# Patient Record
Sex: FEMALE | Race: WHITE | NOT HISPANIC OR LATINO | Employment: UNEMPLOYED | URBAN - METROPOLITAN AREA
[De-identification: names, ages, dates, MRNs, and addresses within clinical notes are randomized per-mention and may not be internally consistent; named-entity substitution may affect disease eponyms.]

---

## 2017-02-23 ENCOUNTER — ALLSCRIPTS OFFICE VISIT (OUTPATIENT)
Dept: OTHER | Facility: OTHER | Age: 22
End: 2017-02-23

## 2017-02-23 LAB
BILIRUB UR QL STRIP: NEGATIVE
CLARITY UR: NORMAL
COLOR UR: YELLOW
GLUCOSE (HISTORICAL): NEGATIVE
HCG, QUALITATIVE (HISTORICAL): NEGATIVE
HGB UR QL STRIP.AUTO: NEGATIVE
KETONES UR STRIP-MCNC: NEGATIVE MG/DL
LEUKOCYTE ESTERASE UR QL STRIP: NORMAL
NITRITE UR QL STRIP: NEGATIVE
PH UR STRIP.AUTO: 6.5 [PH]
PROT UR STRIP-MCNC: NEGATIVE MG/DL
SP GR UR STRIP.AUTO: 1
UROBILINOGEN UR QL STRIP.AUTO: 0.2

## 2017-03-09 ENCOUNTER — ALLSCRIPTS OFFICE VISIT (OUTPATIENT)
Dept: OTHER | Facility: OTHER | Age: 22
End: 2017-03-09

## 2017-03-09 LAB — HCG, QUALITATIVE (HISTORICAL): NEGATIVE

## 2017-03-21 ENCOUNTER — ALLSCRIPTS OFFICE VISIT (OUTPATIENT)
Dept: OTHER | Facility: OTHER | Age: 22
End: 2017-03-21

## 2017-07-27 ENCOUNTER — ALLSCRIPTS OFFICE VISIT (OUTPATIENT)
Dept: OTHER | Facility: OTHER | Age: 22
End: 2017-07-27

## 2017-07-27 LAB — HCG, QUALITATIVE (HISTORICAL): NEGATIVE

## 2017-10-05 ENCOUNTER — ALLSCRIPTS OFFICE VISIT (OUTPATIENT)
Dept: OTHER | Facility: OTHER | Age: 22
End: 2017-10-05
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
## 2018-01-13 VITALS
RESPIRATION RATE: 18 BRPM | TEMPERATURE: 97.4 F | HEIGHT: 62 IN | DIASTOLIC BLOOD PRESSURE: 82 MMHG | BODY MASS INDEX: 34.78 KG/M2 | SYSTOLIC BLOOD PRESSURE: 110 MMHG | WEIGHT: 189 LBS | HEART RATE: 80 BPM

## 2018-01-13 VITALS
WEIGHT: 189 LBS | SYSTOLIC BLOOD PRESSURE: 112 MMHG | RESPIRATION RATE: 16 BRPM | HEIGHT: 62 IN | BODY MASS INDEX: 34.78 KG/M2 | HEART RATE: 82 BPM | DIASTOLIC BLOOD PRESSURE: 70 MMHG | TEMPERATURE: 99.5 F

## 2018-01-15 VITALS
TEMPERATURE: 98.9 F | SYSTOLIC BLOOD PRESSURE: 114 MMHG | HEIGHT: 62 IN | DIASTOLIC BLOOD PRESSURE: 72 MMHG | WEIGHT: 189.13 LBS | HEART RATE: 74 BPM | RESPIRATION RATE: 16 BRPM | BODY MASS INDEX: 34.8 KG/M2

## 2018-01-16 NOTE — PROGRESS NOTES
Assessment    1  Encounter for preventive health examination (V70 0) (Z00 00)   2  Depression (311) (F32 9)    Plan  Unlinked    · FLUoxetine HCl - 20 MG Oral Capsule    Discussion/Summary  health maintenance visit Currently, she eats an adequate diet and has an inadequate exercise regimen  the risks and benefits of cervical cancer screening were discussed cervical cancer screening is needed every three years next cervical cancer screening is due 2019 Colorectal cancer screening: colorectal cancer screening is not indicated  Osteoporosis screening: bone mineral density testing is not indicated  The immunizations are up to date  Advice and education were given regarding nutrition, aerobic exercise, reproductive health and contraception  Patient discussion: discussed with the patient  1  Health maintenance  -annual physical, doing well  -discussed healthy eating and increasing activity level  -UTD on vaccines except for Flu shot, unavailable in the office today, may return in 1 week to receive flu shot  -follow up in 1 year for annual physical or sooner if needed    2  Depression  -PHQ-9 10, moderate depression  -continue Fluoxetine 40mg daily  -follow up in 6 months to re-assess  The patient was counseled regarding instructions for management, risk factor reductions, patient and family education, impressions, risks and benefits of treatment options, importance of compliance with treatment  Possible side effects of new medications were reviewed with the patient/guardian today  The treatment plan was reviewed with the patient/guardian  The patient/guardian understands and agrees with the treatment plan     Self Referrals: No      Chief Complaint  annual physical      History of Present Illness  , Adult Female: The patient is being seen for a health maintenance evaluation  The last health maintenance visit was 1 year(s) ago  General Health: The patient's health since the last visit is described as good  She has regular dental visits  The patient brushes 2 time(s) a day and reports her last dental visit was October, 2017  She denies vision problems  She denies hearing loss  Immunizations status: up to date  Lifestyle:  She does not have a healthy diet  She has weight concerns  She does not exercise regularly  She does not use tobacco  She denies alcohol use  She denies drug use  Reproductive health: the patient is premenopausal   she reports abnormal menses  (Haven't had period since Depo  )   she uses contraception  For contraception, she uses depo-medroxyprogesterone  she is sexually active  She is monogamous with a male partner  she denies prior pregnancies G 0  Screening: cancer screening reviewed and current  Cervical cancer screening includes a pap smear performed last year  Breast cancer screening includes no previous mammogram  She hasn't been previously screened for colorectal cancer  HPI: Patient is a pleasant 22 yo F presenting for annual physical      1  HM: previously on Nexplanon, now on Depo for contraception  Works at [employer]  Patient has mild cognitive delay and has a  who helps her with transportation and finances  She is otherwise independent with ADLs  2  Depression: Fluoxetine 40mg daily, notes improved symptom control since re-starting it last year  Denies SI/HI  Review of Systems    Constitutional: no fever, not feeling poorly, no chills and not feeling tired  Cardiovascular: no chest pain and no palpitations  Respiratory: no shortness of breath, no cough, no wheezing and no shortness of breath during exertion  Gastrointestinal: no abdominal pain, no nausea, no constipation and no diarrhea  Musculoskeletal: no arthralgias and no myalgias  Integumentary: no rashes and no skin lesions  Neurological: no headache, no numbness, no fainting and no difficulty walking     Psychiatric: sleep disturbances and depression, but as noted in HPI and not suicidal  Endocrine: no muscle weakness  no feelings of weakness     ROS reviewed  Active Problems    1  Acne (706 1) (L70 9)   2  Acute UTI (urinary tract infection) (599 0) (N39 0)   3  Altered level of consciousness (780 09) (R40 4)   4  Birth control counseling (V25 09) (Z30 09)   5  Chlamydia trachomatis infection of genitourinary site (099 55) (A56 2)   6  Depot contraception (V25 49) (Z30 40)   7  Depression (311) (F32 9)   8  Dysuria (788 1) (R30 0)   9  Dysuria (788 1) (R30 0)   10  Encounter for completion of form with patient (V68 89) (Z02 89)   11  Encounter for preconception consultation (V26 49) (Z31 69)   12  Fetal alcohol syndrome (760 71) (Q86 0)   13  GERD (gastroesophageal reflux disease) (530 81) (K21 9)   14  History of vitamin D deficiency (V12 1) (Z86 39)   15  Hypoglycemia (251 2) (E16 2)   16  Insertion of Nexplanon (V25 5) (Z30 017)   17  Intellectual disability (319) (F79)   18  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)   19  Moderate major depression (296 22) (F32 1)   20  Need for HPV vaccination (V04 89) (Z23)   21  Need for immunization against influenza (V04 81) (Z23)   22  Nexplanon removal (V25 43) (Z30 46)   23  Obesity (278 00) (E66 9)   24  Obesity (278 00) (E66 9)   25  Papanicolaou smear for cervical cancer screening (V76 2) (Z12 4)   26  Screen for STD (sexually transmitted disease) (V74 5) (Z11 3)   27  Vaginal candidiasis (112 1) (B37 3)    Past Medical History    · Altered level of consciousness (780 09) (R40 4)   · History of Intellectual disability (319) (F79)   · History of Need for HPV vaccination (V04 89) (Z23)   · History of Need for HPV vaccination (V04 89) (Z23)    Family History  Family History    · Adopted (V68 89) (Z02 82)    Social History    · Depot contraception (V25 49) (Z30 40)   · Never a smoker   · No alcohol use   · No drug use    Current Meds   1  FLUoxetine HCl - 20 MG Oral Capsule; Therapy: 29CIL8362 to Recorded   2   FLUoxetine HCl - 40 MG Oral Capsule; take 1 capsule daily; Therapy: 83XTP3560 to (Last Rx:07Jun2017)  Requested for: 07Jun2017 Ordered    Allergies    1  No Known Drug Allergies    2  No Known Environmental Allergies    Vitals   Recorded: 21WPW2275 04:11PM   Temperature 99 1 F, Tympanic   Heart Rate 88   Respiration 14   Systolic 840, LUE, Sitting   Diastolic 72, LUE, Sitting   BP CUFF SIZE Large   Height 5 ft 2 in   Weight 182 lb 8 oz   BMI Calculated 33 38   BSA Calculated 1 84   Pain Scale 0     Physical Exam    Constitutional   General appearance: No acute distress, well appearing and well nourished  Head and Face   Head and face: Normal     Eyes   Conjunctiva and lids: No swelling, erythema or discharge  Ears, Nose, Mouth, and Throat   External inspection of ears and nose: Normal     Otoscopic examination: Tympanic membranes translucent with normal light reflex  Canals patent without erythema  Oropharynx: Normal with no erythema, edema, exudate or lesions  Pulmonary   Respiratory effort: No increased work of breathing or signs of respiratory distress  Auscultation of lungs: Clear to auscultation  Cardiovascular   Auscultation of heart: Normal rate and rhythm, normal S1 and S2, no murmurs  Abdomen   Abdomen: Non-tender, no masses  Lymphatic   Palpation of lymph nodes in neck: No lymphadenopathy  Musculoskeletal   Gait and station: Normal     Skin   Skin and subcutaneous tissue: Normal without rashes or lesions  Psychiatric   Judgment and insight: Normal     Orientation to person, place, and time: Normal     Recent and remote memory: Intact  Mood and affect: Normal        Results/Data  PHQ-9 Adult Depression Screening 87NFN9597 04:36PM Verdene Tyler     Test Name Result Flag Reference   PHQ-9 Adult Depression Score 10     Over the last two weeks, how often have you been bothered by any of the following problems?   Little interest or pleasure in doing things: Several days - 1  Feeling down, depressed, or hopeless: Several days - 1  Trouble falling or staying asleep, or sleeping too much: Nearly every day - 3  Feeling tired or having little energy: More than half the days - 2  Poor appetite or over eating: Several days - 1  Feeling bad about yourself - or that you are a failure or have let yourself or your family down: More than half the days - 2  Trouble concentrating on things, such as reading the newspaper or watching television: Not at all - 0  Moving or speaking so slowly that other people could have noticed  Or the opposite -  being so fidgety or restless that you have been moving around a lot more than usual: Not at all - 0  Thoughts that you would be better off dead, or of hurting yourself in some way: Not at all - 0   PHQ-9 Adult Depression Screening Negative     PHQ-9 Difficulty Level Somewhat difficult     PHQ-9 Severity Moderate Depression         Attending Note  Attending Note: Attending Note: I discussed the case with the Resident and reviewed the Resident's note, I supervised the Resident and I agree with the Resident management plan as it was presented to me  Level of Participation: I was present in clinic, but did not examine the patient  I agree with the Resident's note        Future Appointments    Date/Time Provider Specialty Site   10/17/2017 11:30 AM Nurse [place] Schedule  [facility] [address] [facility]     Signatures   Electronically signed by : Mikie Sadler DO; Oct  5 2017  4:47PM EST                       (Author)    Electronically signed by : Alejo Angelucci, M D ; Oct  6 2017  1:28PM EST                       (Author)

## 2018-01-22 VITALS
DIASTOLIC BLOOD PRESSURE: 72 MMHG | RESPIRATION RATE: 14 BRPM | SYSTOLIC BLOOD PRESSURE: 114 MMHG | BODY MASS INDEX: 33.58 KG/M2 | TEMPERATURE: 99.1 F | HEART RATE: 88 BPM | HEIGHT: 62 IN | WEIGHT: 182.5 LBS

## 2018-02-22 ENCOUNTER — OFFICE VISIT (OUTPATIENT)
Dept: FAMILY MEDICINE CLINIC | Facility: CLINIC | Age: 23
End: 2018-02-22
Payer: COMMERCIAL

## 2018-02-22 VITALS
BODY MASS INDEX: 34.52 KG/M2 | WEIGHT: 202.2 LBS | TEMPERATURE: 99 F | HEIGHT: 64 IN | RESPIRATION RATE: 18 BRPM | SYSTOLIC BLOOD PRESSURE: 104 MMHG | DIASTOLIC BLOOD PRESSURE: 70 MMHG | HEART RATE: 82 BPM

## 2018-02-22 DIAGNOSIS — F33.9 MAJOR DEPRESSION, RECURRENT, CHRONIC (HCC): Primary | ICD-10-CM

## 2018-02-22 DIAGNOSIS — F70 MILD INTELLECTUAL DISABILITY: ICD-10-CM

## 2018-02-22 PROCEDURE — 99213 OFFICE O/P EST LOW 20 MIN: CPT | Performed by: FAMILY MEDICINE

## 2018-02-22 RX ORDER — FLUOXETINE HYDROCHLORIDE 40 MG/1
1 CAPSULE ORAL DAILY
COMMUNITY
Start: 2016-10-24 | End: 2018-02-22 | Stop reason: SDUPTHER

## 2018-02-22 RX ORDER — FLUOXETINE HYDROCHLORIDE 60 MG/1
60 TABLET, FILM COATED ORAL; ORAL DAILY
Qty: 30 TABLET | Refills: 11 | Status: SHIPPED | OUTPATIENT
Start: 2018-02-22 | End: 2018-05-08 | Stop reason: SDUPTHER

## 2018-02-22 NOTE — PROGRESS NOTES
Barbie Lee 1995 female MRN: [id number]    Follow-up Visit      SUBJECTIVE  CC: Medication Refill    HPI:  Barbie Lee is a 23 y o  female with mild IQ disability who presented for a follow-up of Depression  She has had intermittent depression over the past 1 year, without hallucination or thoughts/attempts of self-harm or harming others  She lives with friends, has a  (not present), and has a monogamous heterosexual relationship, without condom usage (not interested), on birth control  Feels safe at home and in her relationship  Review of Systems   Constitutional: Positive for fatigue  Negative for fever  Respiratory: Negative for cough, chest tightness and shortness of breath  Cardiovascular: Negative for chest pain, palpitations and leg swelling  Genitourinary: Negative for genital sores and pelvic pain  Neurological: Negative for dizziness, light-headedness and headaches  Psychiatric/Behavioral: Positive for decreased concentration and sleep disturbance  Negative for behavioral problems, confusion, hallucinations, self-injury and suicidal ideas  The patient is not nervous/anxious  Historical Information   The patient history was reviewed as follows:  Past Medical History:   Diagnosis Date    Altered level of consciousness     Intellectual disability      History reviewed  No pertinent surgical history  Family History   Problem Relation Age of Onset    Adopted: Yes      Social History   History   Alcohol Use No     History   Drug Use No     History   Smoking Status    Never Smoker   Smokeless Tobacco    Not on file       Medications:   Meds/Allergies   Current Outpatient Prescriptions   Medication Sig Dispense Refill    FLUoxetine (PROzac) 60 MG TABS Take 1 tablet (60 mg total) by mouth daily 30 tablet 11     No current facility-administered medications for this visit          No Known Allergies    OBJECTIVE  Vitals:   Vitals:    02/22/18 1632   BP: 104/70   Pulse: 82   Resp: 18   Temp: 99 °F (37 2 °C)   Weight: 91 7 kg (202 lb 3 2 oz)   Height: 5' 4 1" (1 628 m)       Invasive Devices          No matching active lines, drains, or airways          Physical Exam   Constitutional: She is oriented to person, place, and time  She appears well-developed and well-nourished  No distress  Obese   Eyes: Conjunctivae are normal    Neck: Neck supple  Cardiovascular: Normal rate, regular rhythm and normal heart sounds  No murmur heard  Pulmonary/Chest: Effort normal and breath sounds normal  No respiratory distress  She has no wheezes  She has no rales  Abdominal: Soft  She exhibits no distension  There is no tenderness  There is no rebound and no guarding  Musculoskeletal: She exhibits no edema  Neurological: She is alert and oriented to person, place, and time  She exhibits normal muscle tone  Coordination normal    Skin: She is not diaphoretic  Lab:  I have personally reviewed all prior pertinent results  No visits with results within 6 Month(s) from this visit  Latest known visit with results is:   [facility] Office Visit on 07/27/2017   Component Date Value Ref Range Status    HCG, Qualitative 07/27/2017 Negative   Final    Comment:   Performed at: In Office  ,     ]      Assessment/Plan      Major depression, recurrent, chronic (HCC)  - PHQ-9= 16  - TSH 2 7 in 2015  - F/u daily vitamin D supplementation to decrease risk of SAD  - Increasing from 40mg, to FLUoxetine (PROzac) 60 MG TABS; Take 1 tablet (60 mg total) by mouth daily    Mild intellectual disability   - F/u with SW to review care with her      - Previous recent STD testing negative  Body mass index is 34 6 kg/m²   -> F/u counseling on diet, weight loss, aerobic and weight-bearing exercise  PMH, stable, chronic  -> Continuing all home medications or alternatives as reviewed and reconciled      Disposition: Anticipate improvement with increased SSRI dosage  PCP: JAIRO  Follow-up recommendations: Reassess in 4 weeks  No future appointments        _____________________________________________________________________   The attending physician, Dr Alysa Bonds, agreed with the plan      Monserrat Hartman DO, PGY-3  [facility]   2/22/2018

## 2018-02-22 NOTE — PROGRESS NOTES
Jesica Cooper 1995 female MRN: [id number]    Family Medicine Follow-up Visit    ASSESSMENT/PLAN  Problem List Items Addressed This Visit     None              Future Appointments  Date Time Provider Stephani Hernandez   2/22/2018 3:40 PM DO REHAN Borjas [facility]          SUBJECTIVE  CC: No chief complaint on file  HPI:  Jesica Cooper is a 23 y o  female who presents for follow up, med refills  ***    Review of Systems    Historical Information   The patient history was reviewed as follows:    Past Medical History:   Diagnosis Date    Altered level of consciousness     Intellectual disability      No past surgical history on file  Family History   Problem Relation Age of Onset    Adopted: Yes      Social History   History   Alcohol Use No     History   Drug Use No     History   Smoking Status    Never Smoker   Smokeless Tobacco    Not on file       Medications:   No current outpatient prescriptions on file  Allergies not on file    OBJECTIVE    Vitals: There were no vitals filed for this visit          Physical Exam           as    Peggy Jolley DO, PGY-3  [facility]   2/22/2018

## 2018-05-07 NOTE — PROGRESS NOTES
Wagner Clark 1995 female MRN: [id number]    Family Medicine Follow-up Visit      SUBJECTIVE    CC: Follow-up and Depression    HPI:  Wagner Clark is a 23 y o  female who presented for a follow-up of Depression   This is a chronic problem  The problem has been unchanged  Pertinent negatives include no abdominal pain, anorexia, arthralgias, chest pain, chills, congestion, coughing, fatigue, fever, headaches, myalgias, nausea, rash, sore throat, visual change, vomiting or weakness  The symptoms are aggravated by stress  Treatments tried: ssri  The treatment provided moderate relief  Has not tried therapy before  Willing to try   in office states patient was not always compliant with taking pills but now she has been  Stressed importance of compliance  Patient also here for birth control - she has not had DepoProvera since October (overdue)  Has been sexually active without condoms  Will obtain UPreg and Re-start Depo  PHQ-9 Depression Screening    PHQ-9:    Frequency of the following problems over the past two weeks:       Little interest or pleasure in doing things:  1 - several days  Feeling down, depressed, or hopeless:  1 - several days  Trouble falling or staying asleep, or sleeping too much:  3 - nearly every day  Feeling tired or having little energy:  3 - nearly every day  Poor appetite or overeating:  3 - nearly every day  Feeling bad about yourself - or that you are a failure or have let yourself or your family down:  1 - several days  Trouble concentrating on things, such as reading the newspaper or watching television:  1 - several days  Moving or speaking so slowly that other people could have noticed   Or the opposite - being so fidgety or restless that you have been moving around a lot more than usual:  1 - several days  Thoughts that you would be better off dead, or of hurting yourself in some way:  0 - not at all  PHQ-2 Score:  2  PHQ-9 Score:  14         Review of Systems   Constitutional: Negative for activity change, chills, fatigue and fever  HENT: Negative for congestion, rhinorrhea and sore throat  Eyes: Negative for visual disturbance  Respiratory: Negative for cough, shortness of breath and wheezing  Cardiovascular: Negative for chest pain and palpitations  Gastrointestinal: Negative for abdominal pain, anorexia, blood in stool, constipation, diarrhea, nausea and vomiting  Genitourinary: Negative for dysuria  Musculoskeletal: Negative for arthralgias and myalgias  Skin: Negative for rash  Neurological: Negative for dizziness, weakness and headaches  Psychiatric/Behavioral: Positive for depression  All other systems reviewed and are negative  Historical Information     The patient history was reviewed as follows:    Past Medical History:   Diagnosis Date    Altered level of consciousness     Intellectual disability      History reviewed  No pertinent surgical history  Family History   Problem Relation Age of Onset    Adopted:  Yes    No Known Problems Mother     No Known Problems Father       Social History   History   Alcohol Use No     History   Drug Use No     History   Smoking Status    Never Smoker   Smokeless Tobacco    Never Used       Medications:   Meds/Allergies     Current Outpatient Prescriptions:     FLUoxetine (PROzac) 60 MG TABS, Take 1 tablet (60 mg total) by mouth daily, Disp: 90 tablet, Rfl: 2    Current Facility-Administered Medications:     MedroxyPROGESTERone Acetate ELIS 150 mg, 150 mg, Intramuscular, Q3 Months, Zita Douglas MD  No Known Allergies    OBJECTIVE    Vitals:   Vitals:    05/08/18 1317   BP: 128/80   BP Location: Right arm   Patient Position: Sitting   Cuff Size: Large   Pulse: 82   Resp: 16   Temp: 98 5 °F (36 9 °C)   TempSrc: Tympanic   Weight: 89 8 kg (198 lb)   Height: 5' 3 9" (1 623 m)     Wt Readings from Last 3 Encounters:   05/08/18 89 8 kg (198 lb)   02/22/18 91 7 kg (202 lb 3 2 oz) 10/05/17 82 8 kg (182 lb 8 oz)     Body mass index is 34 09 kg/m²  Temp Readings from Last 3 Encounters:   05/08/18 98 5 °F (36 9 °C) (Tympanic)   02/22/18 99 °F (37 2 °C)   10/05/17 99 1 °F (37 3 °C) (Tympanic)     BP Readings from Last 3 Encounters:   05/08/18 128/80   02/22/18 104/70   10/05/17 114/72     Pulse Readings from Last 3 Encounters:   05/08/18 82   02/22/18 82   10/05/17 88     No LMP recorded  Physical Exam:    Physical Exam   Constitutional: Vital signs are normal  She appears well-developed and well-nourished  She does not appear ill  No distress  HENT:   Head: Normocephalic and atraumatic  Right Ear: External ear normal    Left Ear: External ear normal    Nose: Nose normal    Eyes: Conjunctivae, EOM and lids are normal    Neck: No JVD present  No tracheal deviation present  No thyromegaly present  Cardiovascular: Intact distal pulses  Pulmonary/Chest: No accessory muscle usage  No respiratory distress  Abdominal: Normal appearance  Neurological: She is alert  Skin: No rash noted  She is not diaphoretic  Psychiatric: She has a normal mood and affect  Her speech is normal and behavior is normal  She expresses no suicidal ideation  Nursing note and vitals reviewed  Labs: I have personally reviewed all pertinent results  Imaging:  I have personally reviewed all pertinent results  Assessment/Plan   Encounter for counseling regarding contraception  Patient desires to continue Depo-Provera  Urine pregnancy test in office negative  Administered Dep-Provera injection in office today    Depression  Continue Prozac 60mg daily   Ensure better compliance  Start therapy as adjunct  Discussed behavioral interventions (exercise, diet, regular sleep-wake cycle)    Bere Lopez was seen today for follow-up and depression      Diagnoses and all orders for this visit:    Single current episode of major depressive disorder, unspecified depression episode severity  -     Ambulatory referral to behavioral health therapists; Future  -     TSH, 3rd generation with T4 reflex; Future    Class 1 obesity due to excess calories without serious comorbidity in adult, unspecified BMI  -     Lipid Panel with Direct LDL reflex; Future  -     Basic metabolic panel; Future  -     HEMOGLOBIN A1C W/ EAG ESTIMATION; Future    Major depression, recurrent, chronic (HCC)  -     FLUoxetine (PROzac) 60 MG TABS; Take 1 tablet (60 mg total) by mouth daily    Encounter for counseling regarding contraception  -     POCT urine HCG    Depot contraception  -     MedroxyPROGESTERone Acetate ELIS 150 mg; Inject 1 mL (150 mg total) into the shoulder, thigh, or buttocks every 3 (three) months     Other orders  -     Cancel: Ambulatory referral to Weight Management; Future      - PCP: Edil Loza MD  - Follow-up appointments:     No future appointments      _____________________________________________________________________   The attending physician, Dr Nadege Philippe, agreed with the plan      Cherie Lieberman MD, PGY-2  [facility]   5/8/2018

## 2018-05-08 ENCOUNTER — OFFICE VISIT (OUTPATIENT)
Dept: FAMILY MEDICINE CLINIC | Facility: CLINIC | Age: 23
End: 2018-05-08
Payer: COMMERCIAL

## 2018-05-08 VITALS
TEMPERATURE: 98.5 F | BODY MASS INDEX: 33.8 KG/M2 | HEART RATE: 82 BPM | DIASTOLIC BLOOD PRESSURE: 80 MMHG | HEIGHT: 64 IN | SYSTOLIC BLOOD PRESSURE: 128 MMHG | RESPIRATION RATE: 16 BRPM | WEIGHT: 198 LBS

## 2018-05-08 DIAGNOSIS — Z30.42 DEPOT CONTRACEPTION: ICD-10-CM

## 2018-05-08 DIAGNOSIS — F32.9 SINGLE CURRENT EPISODE OF MAJOR DEPRESSIVE DISORDER, UNSPECIFIED DEPRESSION EPISODE SEVERITY: Primary | ICD-10-CM

## 2018-05-08 DIAGNOSIS — E66.09 CLASS 1 OBESITY DUE TO EXCESS CALORIES WITHOUT SERIOUS COMORBIDITY IN ADULT, UNSPECIFIED BMI: ICD-10-CM

## 2018-05-08 DIAGNOSIS — Z30.09 ENCOUNTER FOR COUNSELING REGARDING CONTRACEPTION: ICD-10-CM

## 2018-05-08 DIAGNOSIS — F33.9 MAJOR DEPRESSION, RECURRENT, CHRONIC (HCC): ICD-10-CM

## 2018-05-08 LAB — SL AMB POCT URINE HCG: NEGATIVE

## 2018-05-08 PROCEDURE — 3008F BODY MASS INDEX DOCD: CPT | Performed by: FAMILY MEDICINE

## 2018-05-08 PROCEDURE — 81025 URINE PREGNANCY TEST: CPT | Performed by: FAMILY MEDICINE

## 2018-05-08 PROCEDURE — 99213 OFFICE O/P EST LOW 20 MIN: CPT | Performed by: FAMILY MEDICINE

## 2018-05-08 RX ORDER — FLUOXETINE HYDROCHLORIDE 60 MG/1
60 TABLET, FILM COATED ORAL; ORAL DAILY
Qty: 90 TABLET | Refills: 2 | Status: SHIPPED | OUTPATIENT
Start: 2018-05-08 | End: 2019-02-05 | Stop reason: SDUPTHER

## 2018-05-08 RX ORDER — MEDROXYPROGESTERONE ACETATE 150 MG/ML
150 INJECTION, SUSPENSION INTRAMUSCULAR
Status: DISCONTINUED | OUTPATIENT
Start: 2018-05-08 | End: 2018-05-08

## 2018-05-08 RX ORDER — MEDROXYPROGESTERONE ACETATE 150 MG/ML
150 INJECTION, SUSPENSION INTRAMUSCULAR
Status: DISCONTINUED | OUTPATIENT
Start: 2018-05-08 | End: 2019-01-29

## 2018-05-08 RX ADMIN — MEDROXYPROGESTERONE ACETATE 150 MG: 150 INJECTION, SUSPENSION INTRAMUSCULAR at 13:30

## 2018-05-08 NOTE — PATIENT INSTRUCTIONS
Depression   AMBULATORY CARE:   Depression  is a medical condition that causes feelings of sadness or hopelessness that do not go away  Depression may cause you to lose interest in things you used to enjoy  These feelings may interfere with your daily life  Common symptoms include the following:   · Appetite changes, or weight gain or loss    · Trouble going to sleep or staying asleep, or sleeping too much    · Fatigue or lack of energy    · Feeling restless, irritable, or withdrawn    · Feeling worthless, hopeless, discouraged, or guilty    · Trouble concentrating, remembering things, doing daily tasks, or making decisions    · Thoughts about hurting or killing yourself  Call 911 for any of the following:   · You think about harming yourself or someone else  Contact your healthcare provider if:   · Your symptoms do not improve  · You cannot make it to your next appointment  · You have new symptoms  · You have questions or concerns about your condition or care  Treatment for depression  may include medicine to improve or balance your mood  Therapy may also be used to treat your depression  A therapist will help you learn to cope with your thoughts and feelings  Therapy can be done alone or in a group  It may also be done with family members or a significant other  Self-care:   · Get regular physical activity  Try to exercise for 30 minutes, 3 to 5 days a week  Work with your healthcare provider to develop an exercise plan that you enjoy  Physical activity may improve your symptoms  · Get enough sleep  Create a routine to help you relax before bed  You can listen to music, read, or do yoga  Try to go to bed and wake up at the same time every day  Sleep is important for emotional health  · Eat a variety of healthy foods from all of the food groups  A healthy meal plan is low in fat, salt, and added sugar   Ask your healthcare provider for more information about a meal plan that is right for you      · Do not drink alcohol or use drugs  Alcohol and drugs can make your symptoms worse  Follow up with your healthcare provider as directed: Your healthcare provider will monitor your progress at follow-up visits  He or she will also monitor your medicine if you take antidepressants  Your healthcare provider will ask if the medicine is helping  Tell him or her about any side effects or problems you may have with your medicine  The type or amount of medicine may need to be changed  Write down your questions so you remember to ask them during your visits  © 2017 [employer]  Information is for End User's use only and may not be sold, redistributed or otherwise used for commercial purposes  All illustrations and images included in CareNotes® are the copyrighted property of [employer]  or [employer]ley  The above information is an  only  It is not intended as medical advice for individual conditions or treatments  Talk to your doctor, nurse or pharmacist before following any medical regimen to see if it is safe and effective for you  Medroxyprogesterone (By injection)   Medroxyprogesterone ([id number])  Prevents pregnancy  Also treats endometriosis and is used with other medicines to help relieve symptoms of cancer, including uterine or kidney cancer  Brand Name(s): Depo-Provera, Depo-Provera Contraceptive, Depo-SubQ Provera 104   There may be other brand names for this medicine  When This Medicine Should Not Be Used: This medicine is not right for everyone  You should not receive it if you had an allergic reaction to medroxyprogesterone or if you have a history of breast cancer or blood clots (including heart attack or stroke)  In most cases, you should not use this medicine while you are pregnant  How to Use This Medicine:   Injectable  · A nurse or other health provider will give you this medicine   This medicine is given as a shot into a muscle or just under the skin  · Your exact treatment schedule depends on the reason you are using this medicine  You doctor will explain your personal schedule  ¨ For treatment of cancer symptoms, you may start with a shot once per week  You may need fewer shots as your treatment goes forward  ¨ For birth control or endometriosis, you will need a shot every 3 months (13 weeks)  Read and follow the patient instructions that come with this medicine  Talk to your doctor or pharmacist if you have any questions  ¨ You might need to have the first shot during the first 5 days of your normal menstrual period, to make sure you are not pregnant  If you have just had a baby, you may receive a shot 5 days after birth if you are not breastfeeding or 6 weeks after birth if you are breastfeeding  · Missed dose: You must receive a shot every 3 months if you want to prevent pregnancy  Talk to your doctor or pharmacist if you do not receive your medicine on time, because you may need another form of birth control  Drugs and Foods to Avoid:   Ask your doctor or pharmacist before using any other medicine, including over-the-counter medicines, vitamins, and herbal products  · Some foods and medicines can affect how medroxyprogesterone works  Tell your doctor if you are using any of the following:  ¨ Aminoglutethimide, bosentan, carbamazepine, felbamate, griseofulvin, nefazodone, oxcarbazepine, phenobarbital, phenytoin, rifabutin, rifampin, rifapentine, Keyonna's wort, topiramate  ¨ Medicine to treat an infection (including clarithromycin, itraconazole, ketoconazole, telithromycin, voriconazole)  ¨ Medicine to treat HIV/AIDS (including atazanavir, indinavir, nelfinavir, ritonavir, saquinavir)  Warnings While Using This Medicine:   · Tell your doctor right away if you think you have become pregnant    · Tell your doctor if you are breastfeeding or if you have liver disease, kidney disease, asthma, diabetes, heart disease, seizures, migraine headaches, an eating disorder, osteoporosis, or a history of depression  Tell your doctor if you smoke  · This medicine may cause the following problems:  ¨ Blood clots, which could lead to stroke, heart attack, or other serious problems  ¨ Possible increased risk of breast cancer  ¨ Weak or thin bones, especially with long-term use  · You should not use this medicine for long-term birth control unless you cannot use any other form of birth control  · This medicine will not protect you from HIV/AIDS or other sexually transmitted diseases  · Tell any doctor or dentist who treats you that you are using this medicine  This medicine may affect certain medical test results  · Your doctor will check your progress and the effects of this medicine at regular visits  Keep all appointments  Possible Side Effects While Using This Medicine:   Call your doctor right away if you notice any of these side effects:  · Allergic reaction: Itching or hives, swelling in your face or hands, swelling or tingling in your mouth or throat, chest tightness, trouble breathing  · Chest pain, trouble breathing, or coughing up blood  · Dark urine or pale stools, nausea, vomiting, loss of appetite, stomach pain, yellow skin or eyes  · Heavy or nonstop vaginal bleeding  · Loss of vision, double vision  · Numbness or weakness on one side of your body, sudden or severe headache, problems with vision, speech, or walking  · Severe stomach pain or cramps  If you notice these less serious side effects, talk with your doctor:   · Headache  · Light or missed monthly periods, spotting between periods  · Nervousness or dizziness  · Pain, redness, burning, swelling, or a lump under your skin where the shot was given  · Weight gain  If you notice other side effects that you think are caused by this medicine, tell your doctor  Call your doctor for medical advice about side effects   You may report side effects to FDA at 1-800-FDA-1088  © 2017 [employer] [employer] Information is for End User's use only and may not be sold, redistributed or otherwise used for commercial purposes  The above information is an  only  It is not intended as medical advice for individual conditions or treatments  Talk to your doctor, nurse or pharmacist before following any medical regimen to see if it is safe and effective for you  Wellness Visit for Adults   AMBULATORY CARE:   A wellness visit  is when you see your healthcare provider to get screened for health problems  You can also get advice on how to stay healthy  Write down your questions so you remember to ask them  Ask your healthcare provider how often you should have a wellness visit  What happens at a wellness visit:  Your healthcare provider will ask about your health, and your family history of health problems  This includes high blood pressure, heart disease, and cancer  He or she will ask if you have symptoms that concern you, if you smoke, and about your mood  You may also be asked about your intake of medicines, supplements, food, and alcohol  Any of the following may be done:  · Your weight  will be checked  Your height may also be checked so your body mass index (BMI) can be calculated  Your BMI shows if you are at a healthy weight  · Your blood pressure  and heart rate will be checked  Your temperature may also be checked  · Blood and urine tests  may be done  Blood tests may be done to check your cholesterol levels  Abnormal cholesterol levels increase your risk for heart disease and stroke  You may also need a blood or urine test to check for diabetes if you are at increased risk  Urine tests may be done to look for signs of an infection or kidney disease  · A physical exam  includes checking your heartbeat and lungs with a stethoscope  Your healthcare provider may also check your skin to look for sun damage       · Screening tests  may be recommended  A screening test is done to check for diseases that may not cause symptoms  The screening tests you may need depend on your age, gender, family history, and lifestyle habits  For example, colorectal screening may be recommended if you are 48years old or older  Screening tests you need if you are a woman:   · A Pap smear  is used to screen for cervical cancer  Pap smears are usually done every 3 to 5 years depending on your age  You may need them more often if you have had abnormal Pap smear test results in the past  Ask your healthcare provider how often you should have a Pap smear  · A mammogram  is an x-ray of your breasts to screen for breast cancer  Experts recommend mammograms every 2 years starting at age 48 years  You may need a mammogram at age 52 years or younger if you have an increased risk for breast cancer  Talk to your healthcare provider about when you should start having mammograms and how often you need them  Vaccines you may need:   · Get an influenza vaccine  every year  The influenza vaccine protects you from the flu  Several types of viruses cause the flu  The viruses change over time, so new vaccines are made each year  · Get a tetanus-diphtheria (Td) booster vaccine  every 10 years  This vaccine protects you against tetanus and diphtheria  Tetanus is a severe infection that may cause painful muscle spasms and lockjaw  Diphtheria is a severe bacterial infection that causes a thick covering in the back of your mouth and throat  · Get a human papillomavirus (HPV) vaccine  if you are female and aged 23 to 32 or male 23 to 24 and never received it  This vaccine protects you from HPV infection  HPV is the most common infection spread by sexual contact  HPV may also cause vaginal, penile, and anal cancers  · Get a pneumococcal vaccine  if you are aged 72 years or older  The pneumococcal vaccine is an injection given to protect you from pneumococcal disease   Pneumococcal disease is an infection caused by pneumococcal bacteria  The infection may cause pneumonia, meningitis, or an ear infection  · Get a shingles vaccine  if you are aged 61 or older, even if you have had shingles before  The shingles vaccine is an injection to protect you from the varicella-zoster virus  This is the same virus that causes chickenpox  Shingles is a painful rash that develops in people who had chickenpox or have been exposed to the virus  How to eat healthy:  My Plate is a model for planning healthy meals  It shows the types and amounts of foods that should go on your plate  Fruits and vegetables make up about half of your plate, and grains and protein make up the other half  A serving of dairy is included on the side of your plate  The amount of calories and serving sizes you need depends on your age, gender, weight, and height  Examples of healthy foods are listed below:  · Eat a variety of vegetables  such as dark green, red, and orange vegetables  You can also include canned vegetables low in sodium (salt) and frozen vegetables without added butter or sauces  · Eat a variety of fresh fruits , canned fruit in 100% juice, frozen fruit, and dried fruit  · Include whole grains  At least half of the grains you eat should be whole grains  Examples include whole-wheat bread, wheat pasta, brown rice, and whole-grain cereals such as oatmeal     · Eat a variety of protein foods such as seafood (fish and shellfish), lean meat, and poultry without skin (turkey and chicken)  Examples of lean meats include pork leg, shoulder, or tenderloin, and beef round, sirloin, tenderloin, and extra lean ground beef  Other protein foods include eggs and egg substitutes, beans, peas, soy products, nuts, and seeds  · Choose low-fat dairy products such as skim or 1% milk or low-fat yogurt, cheese, and cottage cheese  · Limit unhealthy fats  such as butter, hard margarine, and shortening         Exercise: Exercise at least 30 minutes per day on most days of the week  Some examples of exercise include walking, biking, dancing, and swimming  You can also fit in more physical activity by taking the stairs instead of the elevator or parking farther away from stores  Include muscle strengthening activities 2 days each week  Regular exercise provides many health benefits  It helps you manage your weight, and decreases your risk for type 2 diabetes, heart disease, stroke, and high blood pressure  Exercise can also help improve your mood  Ask your healthcare provider about the best exercise plan for you  General health and safety guidelines:   · Do not smoke  Nicotine and other chemicals in cigarettes and cigars can cause lung damage  Ask your healthcare provider for information if you currently smoke and need help to quit  E-cigarettes or smokeless tobacco still contain nicotine  Talk to your healthcare provider before you use these products  · Limit alcohol  A drink of alcohol is 12 ounces of beer, 5 ounces of wine, or 1½ ounces of liquor  · Lose weight, if needed  Being overweight increases your risk of certain health conditions  These include heart disease, high blood pressure, type 2 diabetes, and certain types of cancer  · Protect your skin  Do not sunbathe or use tanning beds  Use sunscreen with a SPF 15 or higher  Apply sunscreen at least 15 minutes before you go outside  Reapply sunscreen every 2 hours  Wear protective clothing, hats, and sunglasses when you are outside  · Drive safely  Always wear your seatbelt  Make sure everyone in your car wears a seatbelt  A seatbelt can save your life if you are in an accident  Do not use your cell phone when you are driving  This could distract you and cause an accident  Pull over if you need to make a call or send a text message  · Practice safe sex  Use latex condoms if are sexually active and have more than one partner   Your healthcare provider may recommend screening tests for sexually transmitted infections (STIs)  · Wear helmets, lifejackets, and protective gear  Always wear a helmet when you ride a bike or motorcycle, go skiing, or play sports that could cause a head injury  Wear protective equipment when you play sports  Wear a lifejacket when you are on a boat or doing water sports  © 2017 [address] [employer] Information is for End User's use only and may not be sold, redistributed or otherwise used for commercial purposes  All illustrations and images included in CareNotes® are the copyrighted property of [employer]  or [employer] Marley  The above information is an  only  It is not intended as medical advice for individual conditions or treatments  Talk to your doctor, nurse or pharmacist before following any medical regimen to see if it is safe and effective for you

## 2018-05-08 NOTE — ASSESSMENT & PLAN NOTE
Patient desires to continue Depo-Provera  Urine pregnancy test in office negative  Administered Dep-Provera injection in office today

## 2018-05-08 NOTE — ASSESSMENT & PLAN NOTE
Continue Prozac 60mg daily   Ensure better compliance  Start therapy as adjunct  Discussed behavioral interventions (exercise, diet, regular sleep-wake cycle)

## 2018-09-17 ENCOUNTER — CLINICAL SUPPORT (OUTPATIENT)
Dept: FAMILY MEDICINE CLINIC | Facility: CLINIC | Age: 23
End: 2018-09-17
Payer: COMMERCIAL

## 2018-09-17 DIAGNOSIS — Z32.02 URINE PREGNANCY TEST NEGATIVE: Primary | ICD-10-CM

## 2018-09-17 LAB — SL AMB POCT URINE HCG: NEGATIVE

## 2018-09-17 PROCEDURE — 81025 URINE PREGNANCY TEST: CPT

## 2018-09-17 PROCEDURE — 96372 THER/PROPH/DIAG INJ SC/IM: CPT | Performed by: FAMILY MEDICINE

## 2018-09-17 RX ADMIN — MEDROXYPROGESTERONE ACETATE 150 MG: 150 INJECTION, SUSPENSION INTRAMUSCULAR at 14:10

## 2018-10-09 ENCOUNTER — OFFICE VISIT (OUTPATIENT)
Dept: FAMILY MEDICINE CLINIC | Facility: CLINIC | Age: 23
End: 2018-10-09
Payer: COMMERCIAL

## 2018-10-09 VITALS
BODY MASS INDEX: 33.16 KG/M2 | TEMPERATURE: 98.7 F | DIASTOLIC BLOOD PRESSURE: 80 MMHG | RESPIRATION RATE: 16 BRPM | HEART RATE: 82 BPM | SYSTOLIC BLOOD PRESSURE: 120 MMHG | WEIGHT: 194.2 LBS | HEIGHT: 64 IN

## 2018-10-09 DIAGNOSIS — Z30.09 ENCOUNTER FOR COUNSELING REGARDING CONTRACEPTION: ICD-10-CM

## 2018-10-09 DIAGNOSIS — Z11.3 ROUTINE SCREENING FOR STI (SEXUALLY TRANSMITTED INFECTION): ICD-10-CM

## 2018-10-09 DIAGNOSIS — E66.09 CLASS 1 OBESITY DUE TO EXCESS CALORIES WITHOUT SERIOUS COMORBIDITY WITH BODY MASS INDEX (BMI) OF 33.0 TO 33.9 IN ADULT: ICD-10-CM

## 2018-10-09 DIAGNOSIS — F70 MILD INTELLECTUAL DISABILITY: ICD-10-CM

## 2018-10-09 DIAGNOSIS — F33.9 MAJOR DEPRESSION, RECURRENT, CHRONIC (HCC): ICD-10-CM

## 2018-10-09 DIAGNOSIS — Z00.00 HEALTHCARE MAINTENANCE: Primary | ICD-10-CM

## 2018-10-09 DIAGNOSIS — Z23 NEED FOR INFLUENZA VACCINATION: ICD-10-CM

## 2018-10-09 DIAGNOSIS — Z13.1 SCREENING FOR DIABETES MELLITUS (DM): ICD-10-CM

## 2018-10-09 PROBLEM — G44.209 TENSION HEADACHE: Status: ACTIVE | Noted: 2018-10-09

## 2018-10-09 PROCEDURE — 87491 CHLMYD TRACH DNA AMP PROBE: CPT | Performed by: FAMILY MEDICINE

## 2018-10-09 PROCEDURE — 90471 IMMUNIZATION ADMIN: CPT | Performed by: FAMILY MEDICINE

## 2018-10-09 PROCEDURE — 99395 PREV VISIT EST AGE 18-39: CPT | Performed by: FAMILY MEDICINE

## 2018-10-09 PROCEDURE — 90686 IIV4 VACC NO PRSV 0.5 ML IM: CPT | Performed by: FAMILY MEDICINE

## 2018-10-09 PROCEDURE — 87591 N.GONORRHOEAE DNA AMP PROB: CPT | Performed by: FAMILY MEDICINE

## 2018-10-09 RX ORDER — FLUCONAZOLE 150 MG/1
TABLET ORAL
Refills: 0 | COMMUNITY
Start: 2018-10-06 | End: 2018-11-16

## 2018-10-09 NOTE — PROGRESS NOTES
Swetha Tripp 1995 female MRN: [id number]    Health Maintenance Visit    SUBJECTIVE    HPI:  Swetha Tripp is a 20 y o  female who presented for a routine health maintenance visit  She has 2 concerns:  1  Headache: bilateral squeezing headache, every other day sometimes a few minutes sometimes all day  Has been years  Nothing makes it better or worse (tried Tylenol sometimes helps)  Non-radiating  No visual disturbances, nausea, vomiting  Drinks coffee, soda, iced tea  2  Weight concern: They are concerned for her to be on Depo-Provera and weight gain  Interested in alternative  Didn't like Nexplanon  Interested in IUD  3  Interested in [address] screening and lipid screening  Walks 1-2 hours daily  Eats TID  Low vegetable content  Goes to bed at 11pm and wakes up at 7:30a  PHQ-9 Depression Screening    PHQ-9:    Frequency of the following problems over the past two weeks:       Little interest or pleasure in doing things:  0 - not at all  Feeling down, depressed, or hopeless:  1 - several days  Trouble falling or staying asleep, or sleeping too much:  1 - several days  Feeling tired or having little energy:  1 - several days  Poor appetite or overeatin - not at all  Feeling bad about yourself - or that you are a failure or have let yourself or your family down:  1 - several days  Trouble concentrating on things, such as reading the newspaper or watching television:  2 - more than half the days  Moving or speaking so slowly that other people could have noticed   Or the opposite - being so fidgety or restless that you have been moving around a lot more than usual:  0 - not at all  Thoughts that you would be better off dead, or of hurting yourself in some way:  0 - not at all  PHQ-2 Score:  1  PHQ-9 Score:  6         Health Maintenance   Topic Date Due    DTaP,Tdap,and Td Vaccines (6 - Tdap) 2004    INFLUENZA VACCINE  2018    PAP SMEAR  10/24/2019     Review of Systems Constitutional: Negative for activity change, chills and fever  HENT: Negative for congestion, rhinorrhea and sore throat  Eyes: Negative for visual disturbance  Respiratory: Negative for cough, shortness of breath and wheezing  Cardiovascular: Negative for chest pain and palpitations  Gastrointestinal: Negative for abdominal pain, blood in stool, constipation, diarrhea, nausea and vomiting  Genitourinary: Negative for dysuria  Musculoskeletal: Negative for arthralgias and myalgias  Skin: Negative for rash  Neurological: Negative for dizziness, weakness and headaches  All other systems reviewed and are negative  Historical Information   Past Medical History:   Diagnosis Date    Altered level of consciousness     Intellectual disability      No past surgical history on file  Family History   Problem Relation Age of Onset    Adopted:  Yes    No Known Problems Mother     No Known Problems Father      Social History   History   Alcohol Use    0 6 oz/week    1 Glasses of wine per week     History   Drug Use No     History   Smoking Status    Never Smoker   Smokeless Tobacco    Never Used       Medications:      Current Outpatient Prescriptions:     fluconazole (DIFLUCAN) 150 mg tablet, take 1 tablet by mouth immediately, Disp: , Rfl: 0    FLUoxetine (PROzac) 60 MG TABS, Take 1 tablet (60 mg total) by mouth daily, Disp: 90 tablet, Rfl: 2    Current Facility-Administered Medications:     medroxyPROGESTERone (DEPO-PROVERA) IM injection 150 mg, 150 mg, Intramuscular, Q3 Months, Winifred Maguire MD, 150 mg at 09/17/18 1410    No Known Allergies    OBJECTIVE  Vitals:   Vitals:    10/09/18 1053   BP: 120/80   Pulse: 82   Resp: 16   Temp: 98 7 °F (37 1 °C)   Weight: 88 1 kg (194 lb 3 2 oz)   Height: 5' 3 9" (1 623 m)     Wt Readings from Last 3 Encounters:   10/09/18 88 1 kg (194 lb 3 2 oz)   05/08/18 89 8 kg (198 lb)   02/22/18 91 7 kg (202 lb 3 2 oz)     Body mass index is 33 44 kg/m²  Temp Readings from Last 3 Encounters:   10/09/18 98 7 °F (37 1 °C)   05/08/18 98 5 °F (36 9 °C) (Tympanic)   02/22/18 99 °F (37 2 °C)     BP Readings from Last 3 Encounters:   10/09/18 120/80   05/08/18 128/80   02/22/18 104/70     Pulse Readings from Last 3 Encounters:   10/09/18 82   05/08/18 82   02/22/18 82       No LMP recorded  Physical Exam   Constitutional: She appears well-developed and well-nourished  obese   HENT:   Head: Normocephalic and atraumatic  Eyes: Conjunctivae and EOM are normal    Cardiovascular: Normal rate, regular rhythm, normal heart sounds and intact distal pulses  No murmur heard  Pulmonary/Chest: Effort normal and breath sounds normal  No respiratory distress  She has no wheezes  Abdominal: Soft  Bowel sounds are normal  She exhibits no distension  There is no tenderness  Neurological: She is alert  Skin: Skin is warm and dry  Nursing note and vitals reviewed  Lab:  I have personally reviewed all pertinent results  Imaging:  I have personally reviewed all pertinent results  Assessment/Plan     Routine screening for STI (sexually transmitted infection)  Per patient request    Encounter for counseling regarding contraception  Patient currently on Depo Provera but interested in LARC  RTC 1 month for insertion    We have discussed the different types of IUDs  We discussed the different progesterone releasing IUDs  We have reviewed the following:  Mirena IUD containing a total of 52mg of levonorgestrel, and releasing 20 mcg/day and lasting 5 years, the [address] IUD containing a total of 52mg of levonorgestrel and releasing 18 6 mcg/day and lasting 4 years, the [place] IUD containing a total of 19 5 mg of levonorgestrel  and releasing 17 5 mcg of levonorgestrel a day and lasting 5 years, and finally the [place] IUD containing 13 5 mg of levonorgestrel and releasing 14mcg/day and lasting 3 years        We discussed the copper IUD, and the risk for heavier periods  We have discussed a 60% amenorrhea rate and 60% ovulation rate with the Mirena  We have discussed the risks including risk of  expulsion, failure (0 2-0 33%), and perforation(1 in 1000)  The expulsion rate is between 2% and 10% during the first year  Patient is aware that she must still use condoms to help prevent the spread of STDs  She would like the Mirena IUD  Patient will eat a meal prior to her appointment, and take 550mg of naproxen sodium  1 hour prior to her appointment  She will also check with her insurance to make sure it is covered prior to her appointment  Major depression, recurrent, chronic (HCC)  PHQ9 score 6  Continue current regimen Prozac 60mg QD    Sherry was seen today for physical exam     Diagnoses and all orders for this visit:    Healthcare maintenance    Need for influenza vaccination  -     SYRINGE/SINGLE-DOSE VIAL: influenza vaccine, [id number], quadrivalent, 0 5 mL, preservative-free, for patients 3+ yr (FLUZONE)    Screening for diabetes mellitus (DM)  -     Basic metabolic panel; Future  -     Hemoglobin A1C; Future    Class 1 obesity due to excess calories without serious comorbidity with body mass index (BMI) of 33 0 to 33 9 in adult  -     Lipid Panel with Direct LDL reflex; Future  -     Basic metabolic panel; Future  -     TSH, 3rd generation with Free T4 reflex; Future  -     CBC; Future    Routine screening for STI (sexually transmitted infection)  -     RPR; Future  -     HIV 1/2 AG-AB combo; Future    Encounter for counseling regarding contraception    Major depression, recurrent, chronic ([facility] [address] )      New Medications Ordered This Visit   Medications    fluconazole (DIFLUCAN) 150 mg tablet     Sig: take 1 tablet by mouth immediately     Refill:  0       In addition to the above, the patient was counseled on general preventative health care subjects, including but not limited to:  - Nutrition, healthy weight, aerobic and weight-bearing exercise    - Mental health, social support, and self care  - Advised of the importance of dental hygiene and routine dental visits  [facility]    - Patient made aware of  services at the office  Full counseling on the many choices of family planning methods including IUD is provided, and all questions answered  Compliance is strongly emphasized  Most Recent Immunizations   Administered Date(s) Administered    BCG 1993    DTP 11/13/1995    DTaP 08/09/1999    HPV Quadrivalent 02/28/2017    HPV9 10/24/2016    Hep B, Adolescent or Pediatric 07/06/1999    Influenza Quadrivalent, 6-35 Months IM 10/24/2016    Influenza TIV (IM) 12/08/2014    Influenza, injectable, quadrivalent, preservative free 0 5 mL 10/09/2018    MMR 08/09/1999    Measles 08/23/1994    OPV 11/13/1995    Varicella 07/06/1999     Return to [facility] in 1 year for annual  visit  PCP: Dwight Sanchez MD    Future Appointments  Date Time Provider Stephani Hernandez   11/27/2018 1:20 PM Dwight Sanchez MD S [facility]   12/4/2018 1:30 PM [facility] PRACTICE NURSE S [facility]  [facility]       _____________________________________________________________________   The attending physician, Dr Jaime Ferrell, agreed with the plan  Dwight Sanchez MD, PGY-3  [facility] Medicine   10/9/2018     Please be aware that this note contains text that was dictated and there may be errors pertaining to "sound-alike "words during the dictation process

## 2018-10-09 NOTE — ASSESSMENT & PLAN NOTE
Patient currently on Depo Provera but interested in LARC  RTC 1 month for insertion    We have discussed the different types of IUDs  We discussed the different progesterone releasing IUDs  We have reviewed the following:  Mirena IUD containing a total of 52mg of levonorgestrel, and releasing 20 mcg/day and lasting 5 years, the [address] IUD containing a total of 52mg of levonorgestrel and releasing 18 6 mcg/day and lasting 4 years, the [place] IUD containing a total of 19 5 mg of levonorgestrel  and releasing 17 5 mcg of levonorgestrel a day and lasting 5 years, and finally the [place] IUD containing 13 5 mg of levonorgestrel and releasing 14mcg/day and lasting 3 years  We discussed the copper IUD, and the risk for heavier periods  We have discussed a 60% amenorrhea rate and 60% ovulation rate with the Mirena  We have discussed the risks including risk of  expulsion, failure (0 2-0 33%), and perforation(1 in 1000)  The expulsion rate is between 2% and 10% during the first year  Patient is aware that she must still use condoms to help prevent the spread of STDs  She would like the Mirena IUD  Patient will eat a meal prior to her appointment, and take 550mg of naproxen sodium  1 hour prior to her appointment  She will also check with her insurance to make sure it is covered prior to her appointment

## 2018-10-09 NOTE — PATIENT INSTRUCTIONS
Wellness Visit for Adults   AMBULATORY CARE:   A wellness visit  is when you see your healthcare provider to get screened for health problems  You can also get advice on how to stay healthy  Write down your questions so you remember to ask them  Ask your healthcare provider how often you should have a wellness visit  What happens at a wellness visit:  Your healthcare provider will ask about your health, and your family history of health problems  This includes high blood pressure, heart disease, and cancer  He or she will ask if you have symptoms that concern you, if you smoke, and about your mood  You may also be asked about your intake of medicines, supplements, food, and alcohol  Any of the following may be done:  · Your weight  will be checked  Your height may also be checked so your body mass index (BMI) can be calculated  Your BMI shows if you are at a healthy weight  · Your blood pressure  and heart rate will be checked  Your temperature may also be checked  · Blood and urine tests  may be done  Blood tests may be done to check your cholesterol levels  Abnormal cholesterol levels increase your risk for heart disease and stroke  You may also need a blood or urine test to check for diabetes if you are at increased risk  Urine tests may be done to look for signs of an infection or kidney disease  · A physical exam  includes checking your heartbeat and lungs with a stethoscope  Your healthcare provider may also check your skin to look for sun damage  · Screening tests  may be recommended  A screening test is done to check for diseases that may not cause symptoms  The screening tests you may need depend on your age, gender, family history, and lifestyle habits  For example, colorectal screening may be recommended if you are 48years old or older  Screening tests you need if you are a woman:   · A Pap smear  is used to screen for cervical cancer   Pap smears are usually done every 3 to 5 years depending on your age  You may need them more often if you have had abnormal Pap smear test results in the past  Ask your healthcare provider how often you should have a Pap smear  · A mammogram  is an x-ray of your breasts to screen for breast cancer  Experts recommend mammograms every 2 years starting at age 48 years  You may need a mammogram at age 52 years or younger if you have an increased risk for breast cancer  Talk to your healthcare provider about when you should start having mammograms and how often you need them  Vaccines you may need:   · Get an influenza vaccine  every year  The influenza vaccine protects you from the flu  Several types of viruses cause the flu  The viruses change over time, so new vaccines are made each year  · Get a tetanus-diphtheria (Td) booster vaccine  every 10 years  This vaccine protects you against tetanus and diphtheria  Tetanus is a severe infection that may cause painful muscle spasms and lockjaw  Diphtheria is a severe bacterial infection that causes a thick covering in the back of your mouth and throat  · Get a human papillomavirus (HPV) vaccine  if you are female and aged 23 to 32 or male 23 to 24 and never received it  This vaccine protects you from HPV infection  HPV is the most common infection spread by sexual contact  HPV may also cause vaginal, penile, and anal cancers  · Get a pneumococcal vaccine  if you are aged 72 years or older  The pneumococcal vaccine is an injection given to protect you from pneumococcal disease  Pneumococcal disease is an infection caused by pneumococcal bacteria  The infection may cause pneumonia, meningitis, or an ear infection  · Get a shingles vaccine  if you are aged 61 or older, even if you have had shingles before  The shingles vaccine is an injection to protect you from the varicella-zoster virus  This is the same virus that causes chickenpox   Shingles is a painful rash that develops in people who had chickenpox or have been exposed to the virus  How to eat healthy:  My Plate is a model for planning healthy meals  It shows the types and amounts of foods that should go on your plate  Fruits and vegetables make up about half of your plate, and grains and protein make up the other half  A serving of dairy is included on the side of your plate  The amount of calories and serving sizes you need depends on your age, gender, weight, and height  Examples of healthy foods are listed below:  · Eat a variety of vegetables  such as dark green, red, and orange vegetables  You can also include canned vegetables low in sodium (salt) and frozen vegetables without added butter or sauces  · Eat a variety of fresh fruits , canned fruit in 100% juice, frozen fruit, and dried fruit  · Include whole grains  At least half of the grains you eat should be whole grains  Examples include whole-wheat bread, wheat pasta, brown rice, and whole-grain cereals such as oatmeal     · Eat a variety of protein foods such as seafood (fish and shellfish), lean meat, and poultry without skin (turkey and chicken)  Examples of lean meats include pork leg, shoulder, or tenderloin, and beef round, sirloin, tenderloin, and extra lean ground beef  Other protein foods include eggs and egg substitutes, beans, peas, soy products, nuts, and seeds  · Choose low-fat dairy products such as skim or 1% milk or low-fat yogurt, cheese, and cottage cheese  · Limit unhealthy fats  such as butter, hard margarine, and shortening  Exercise:  Exercise at least 30 minutes per day on most days of the week  Some examples of exercise include walking, biking, dancing, and swimming  You can also fit in more physical activity by taking the stairs instead of the elevator or parking farther away from stores  Include muscle strengthening activities 2 days each week  Regular exercise provides many health benefits   It helps you manage your weight, and decreases your risk for type 2 diabetes, heart disease, stroke, and high blood pressure  Exercise can also help improve your mood  Ask your healthcare provider about the best exercise plan for you  General health and safety guidelines:   · Do not smoke  Nicotine and other chemicals in cigarettes and cigars can cause lung damage  Ask your healthcare provider for information if you currently smoke and need help to quit  E-cigarettes or smokeless tobacco still contain nicotine  Talk to your healthcare provider before you use these products  · Limit alcohol  A drink of alcohol is 12 ounces of beer, 5 ounces of wine, or 1½ ounces of liquor  · Lose weight, if needed  Being overweight increases your risk of certain health conditions  These include heart disease, high blood pressure, type 2 diabetes, and certain types of cancer  · Protect your skin  Do not sunbathe or use tanning beds  Use sunscreen with a SPF 15 or higher  Apply sunscreen at least 15 minutes before you go outside  Reapply sunscreen every 2 hours  Wear protective clothing, hats, and sunglasses when you are outside  · Drive safely  Always wear your seatbelt  Make sure everyone in your car wears a seatbelt  A seatbelt can save your life if you are in an accident  Do not use your cell phone when you are driving  This could distract you and cause an accident  Pull over if you need to make a call or send a text message  · Practice safe sex  Use latex condoms if are sexually active and have more than one partner  Your healthcare provider may recommend screening tests for sexually transmitted infections (STIs)  · Wear helmets, lifejackets, and protective gear  Always wear a helmet when you ride a bike or motorcycle, go skiing, or play sports that could cause a head injury  Wear protective equipment when you play sports  Wear a lifejacket when you are on a boat or doing water sports    © 2017 [employer] Information is for End User's use only and may not be sold, redistributed or otherwise used for commercial purposes  All illustrations and images included in CareNotes® are the copyrighted property of A D A M , Inc  or Wilmer Marley  The above information is an  only  It is not intended as medical advice for individual conditions or treatments  Talk to your doctor, nurse or pharmacist before following any medical regimen to see if it is safe and effective for you  Thank you for enrolling in [address] [place], [place] [address]bhavya  Please follow the instructions below to securely access your online medical record  [email or web address] allows you to send messages to your doctor, view your test results, renew your prescriptions, schedule appointments, and more  [facility] uses Single Sign on (SSO) Technology for you to log in and access our BUX, including [email or web address]  No more remembering multiple user names and passwords! We are going to guide you through, step by step, to help you set up your [employer] account which will provide access to your [employer] account  How Do I Sign Up? 1  In your Internet browser, go to [email or web address]       2  Click on the [facility] patient account and then click Dont have an                 Account? Create one now      3  Enter your demographic information and chose a user name (email address) and password  Think of one that is secure and easy to remember  Enter a Referral code if you have one (this is not your Alltuitionhart code ) Accept the Terms and Conditions and the Privacy Policy  4  Select your security questions that you will use to reset your password should you forget it  Click Submit  5  Enter your [email or web address] Activation Code exactly as it appears below  You will not need to use this code after you have completed the sign-up process  If you do not sign up before the expiration date, you must request a new code                           [email or web address] Activation Code: [email or web address]  Expires: 10/23/2018 11:13 AM    6  Confirm your email address  An email confirmation was sent to you  Please open that email and click Confirm your Email   You should then be redirected to our Jeffery Chacon Single sign on page, where you will log on with the user name and password you created! Proceed to the [employer] to view your personal health information  Additional Information  If you have questions, you can e-mail [email or web address] or call [phone] to talk to our customer support staff  Remember, MyChart is NOT to be used for urgent needs  For medical emergencies, dial 911  Levonorgestrel (Into the uterus)   Levonorgestrel ([id number])  Prevents pregnancy and treats heavy menstrual bleeding  This is an intrauterine device (IUD), which is a reversible form of birth control  This IUD slowly releases levonorgestrel, a hormone  Brand Name(s): Sathish Galeas, [facility]   There may be other brand names for this medicine  When This Medicine Should Not Be Used: This device is not right for everyone  Do not use it if you had an allergic reaction to levonorgestrel, or you are pregnant  How to Use This Medicine:   Device  · The IUD is usually inserted by your doctor during your monthly period  You will need to see your doctor 4 to 6 weeks after the IUD is placed and then once a year  · Your IUD has a string or "tail" that is made of plastic thread  About one or two inches of this string hangs into your vagina  You cannot see this string, and it will not cause problems when you have sex  Check your IUD after each monthly period  You may not be protected against pregnancy if you cannot feel the string or if you feel plastic  Do the following to check the placement of your IUD:  [facility] your hands with soap and warm water  Dry them with a clean towel  ¨ Bend your knees and squat low to the ground    ¨ Gently put your index finger high inside your vagina  The cervix is at the top of the vagina  Find the IUD string coming from your cervix  Never pull on the string  You should not be able to feel the plastic of the IUD itself  Wash your hands after you are done checking your IUD string  · Your doctor will need to replace your IUD after 3 years for [facility] or [place], or after 5 years for [facility] or [facility]  You will also need to have it replaced if it comes out of your uterus  Drugs and Foods to Avoid:   Ask your doctor or pharmacist before using any other medicine, including over-the-counter medicines, vitamins, and herbal products  · Some medicines can affect how this device works  Tell your doctor if you are using a blood thinner (including warfarin)  Warnings While Using This Medicine:   · Tell your doctor if you are breastfeeding, or you have had a baby, miscarriage, or  in the past 3 months  Tell your doctor if you have liver disease (including tumor or cancer), breast cancer, heart or blood circulation problems, including a history of heart valve problems, heart disease, blood clotting problems, stroke, heart attack, or high blood pressure  Tell your doctor if you have problems with your immune system or have had surgery on your female organs (especially fallopian tubes)  · Tell your doctor if you have had any problems, infections, or other conditions that affected your reproductive system  There are many problems that could make an IUD a bad choice for you, including if you have fibroids, unexplained bleeding, a uterus that has an unusual shape, a recent infection, pelvic inflammatory disease, abnormal Pap test, ectopic pregnancy, cancer or suspected cancer, or an existing IUD  · There is a small chance that you could get pregnant when using an IUD, just as there is with any birth control  If you get pregnant, your doctor may remove your IUD to lower the risk of miscarriage or other problems    · This medicine may cause the following problems:  ¨ Increased risk of ectopic pregnancy (pregnancy outside the uterus)  ¨ Increased risk of a serious infection called pelvic inflammatory disease (PID)  ¨ Increased risk for ovarian cysts  ¨ Perforation (hole in the wall of your uterus), which can damage other organs  · You might have some spotting and cramping during the first weeks after the IUD has been inserted  These symptoms should decrease or go away within a few weeks up to 6 months  · You could have less bleeding or even stop having periods by the end of the first year  Call your doctor if you have a change from the regular bleeding pattern after you have had your IUD for awhile, such as more bleeding or if you miss a period (and you were having periods even with your IUD)  · An IUD can slip partly or all of the way out of your uterus  If this happens, use condoms or another form of birth control, and call your doctor right away  · This IUD will not protect you from HIV/AIDS, herpes, or other sexually transmitted diseases    · If you have the Milagro Barrow or Ree Krishan, tell your healthcare provider before you have an MRI test   Possible Side Effects While Using This Medicine:   Call your doctor right away if you notice any of these side effects:  · Allergic reaction: Itching or hives, swelling in your face or hands, swelling or tingling in your mouth or throat, chest tightness, trouble breathing  · Chest pain, problems with speech or walking, numbness or weakness in your arm or leg or on one side of your body  · Heavy bleeding from your vagina  · Pain during sex, or if your partner feels the hard plastic of the IUD during sex  · Severe headache, vision changes  · Stomach or pelvic pain, tenderness, or cramping that is sudden or severe  · Vaginal discharge has a bad smell, fever, chills, sores on your genitals  · Yellow skin or eyes  If you notice these less serious side effects, talk with your doctor:   · Acne or other skin changes  · Breast pain  · Change in bleeding pattern after the first few months  · Dizziness or lightheadedness after IUD is placed  · Mild itching around your vagina and genitals  If you notice other side effects that you think are caused by this medicine, tell your doctor  Call your doctor for medical advice about side effects  You may report side effects to FDA at 1-264-FDA-4546  © 2017 [address] [employer] Information is for End User's use only and may not be sold, redistributed or otherwise used for commercial purposes  The above information is an  only  It is not intended as medical advice for individual conditions or treatments  Talk to your doctor, nurse or pharmacist before following any medical regimen to see if it is safe and effective for you

## 2018-10-11 LAB
CHLAMYDIA DNA CVX QL NAA+PROBE: NORMAL
N GONORRHOEA DNA GENITAL QL NAA+PROBE: NORMAL

## 2018-11-16 ENCOUNTER — OFFICE VISIT (OUTPATIENT)
Dept: URGENT CARE | Age: 23
End: 2018-11-16
Payer: COMMERCIAL

## 2018-11-16 VITALS
HEIGHT: 63 IN | RESPIRATION RATE: 18 BRPM | SYSTOLIC BLOOD PRESSURE: 122 MMHG | WEIGHT: 197.2 LBS | OXYGEN SATURATION: 98 % | BODY MASS INDEX: 34.94 KG/M2 | DIASTOLIC BLOOD PRESSURE: 78 MMHG | HEART RATE: 74 BPM | TEMPERATURE: 98.5 F

## 2018-11-16 DIAGNOSIS — J02.0 STREP PHARYNGITIS: Primary | ICD-10-CM

## 2018-11-16 DIAGNOSIS — J06.9 UPPER RESPIRATORY INFECTION, VIRAL: ICD-10-CM

## 2018-11-16 LAB — S PYO AG THROAT QL: POSITIVE

## 2018-11-16 PROCEDURE — 99283 EMERGENCY DEPT VISIT LOW MDM: CPT | Performed by: FAMILY MEDICINE

## 2018-11-16 PROCEDURE — G0382 LEV 3 HOSP TYPE B ED VISIT: HCPCS | Performed by: FAMILY MEDICINE

## 2018-11-16 PROCEDURE — 87430 STREP A AG IA: CPT | Performed by: FAMILY MEDICINE

## 2018-11-16 RX ORDER — BROMPHENIRAMINE MALEATE, PSEUDOEPHEDRINE HYDROCHLORIDE, AND DEXTROMETHORPHAN HYDROBROMIDE 2; 30; 10 MG/5ML; MG/5ML; MG/5ML
5 SYRUP ORAL 4 TIMES DAILY PRN
Qty: 118 ML | Refills: 0 | Status: SHIPPED | OUTPATIENT
Start: 2018-11-16 | End: 2020-04-16

## 2018-11-16 RX ORDER — FLUTICASONE PROPIONATE 50 MCG
1 SPRAY, SUSPENSION (ML) NASAL DAILY
Qty: 16 G | Refills: 0 | Status: SHIPPED | OUTPATIENT
Start: 2018-11-16 | End: 2018-11-16 | Stop reason: SDUPTHER

## 2018-11-16 RX ORDER — FLUTICASONE PROPIONATE 50 MCG
1 SPRAY, SUSPENSION (ML) NASAL DAILY
Qty: 16 G | Refills: 0 | Status: SHIPPED | OUTPATIENT
Start: 2018-11-16 | End: 2020-04-16

## 2018-11-16 RX ORDER — AMOXICILLIN 500 MG/1
500 TABLET, FILM COATED ORAL 2 TIMES DAILY
Qty: 20 TABLET | Refills: 0 | Status: SHIPPED | OUTPATIENT
Start: 2018-11-16 | End: 2018-11-26

## 2018-11-16 RX ORDER — BROMPHENIRAMINE MALEATE, PSEUDOEPHEDRINE HYDROCHLORIDE, AND DEXTROMETHORPHAN HYDROBROMIDE 2; 30; 10 MG/5ML; MG/5ML; MG/5ML
5 SYRUP ORAL 4 TIMES DAILY PRN
Qty: 118 ML | Refills: 0 | Status: SHIPPED | OUTPATIENT
Start: 2018-11-16 | End: 2018-11-16 | Stop reason: SDUPTHER

## 2018-11-16 NOTE — LETTER
November 16, 2018     Patient: Lesley Lerner   YOB: 1995   Date of Visit: 11/16/2018       To Whom it May Concern:    Lesley Lerner is under my professional care  She was seen in my office on 11/16/2018  She may return to work 11/19/2018  If you have any questions or concerns, please don't hesitate to call           Sincerely,          Steven Jackson PA-C        CC: No Recipients

## 2018-12-18 ENCOUNTER — TELEPHONE (OUTPATIENT)
Dept: FAMILY MEDICINE CLINIC | Facility: CLINIC | Age: 23
End: 2018-12-18

## 2018-12-21 NOTE — TELEPHONE ENCOUNTER
Left message pt to call back to schedule IUD I am looking at January tuesdays with Dr Rodolfo Jackson and [facility]

## 2019-01-14 NOTE — TELEPHONE ENCOUNTER
I have scheduled IUD MIRENA placement for 1/29/19 Tuesday at 140 pm  Any concerns let me know   Thank you

## 2019-01-29 ENCOUNTER — PROCEDURE VISIT (OUTPATIENT)
Dept: FAMILY MEDICINE CLINIC | Facility: CLINIC | Age: 24
End: 2019-01-29

## 2019-01-29 VITALS
RESPIRATION RATE: 16 BRPM | SYSTOLIC BLOOD PRESSURE: 120 MMHG | DIASTOLIC BLOOD PRESSURE: 70 MMHG | BODY MASS INDEX: 36.46 KG/M2 | HEIGHT: 63 IN | WEIGHT: 205.8 LBS | HEART RATE: 78 BPM | TEMPERATURE: 98.3 F

## 2019-01-29 DIAGNOSIS — Z30.430 ENCOUNTER FOR INSERTION OF INTRAUTERINE CONTRACEPTIVE DEVICE (IUD): Primary | ICD-10-CM

## 2019-01-29 LAB — SL AMB POCT URINE HCG: NEGATIVE

## 2019-01-29 PROCEDURE — 58300 INSERT INTRAUTERINE DEVICE: CPT | Performed by: FAMILY MEDICINE

## 2019-01-29 PROCEDURE — 81025 URINE PREGNANCY TEST: CPT | Performed by: FAMILY MEDICINE

## 2019-01-29 RX ORDER — IBUPROFEN 600 MG/1
600 TABLET ORAL ONCE
Status: DISCONTINUED | OUTPATIENT
Start: 2019-01-29 | End: 2019-01-29

## 2019-01-29 RX ORDER — IBUPROFEN 600 MG/1
600 TABLET ORAL ONCE
Status: COMPLETED | OUTPATIENT
Start: 2019-01-29 | End: 2019-01-29

## 2019-01-29 RX ADMIN — IBUPROFEN 600 MG: 600 TABLET ORAL at 16:19

## 2019-01-29 NOTE — PROGRESS NOTES
Iud insertions  Date/Time: 1/29/2019 4:07 PM  Performed by: Chris Thomas  Authorized by: Chris Thomas     Consent:     Consent obtained:  Verbal and written    Consent given by:  Patient    Procedure risks and benefits discussed: yes      Patient questions answered: yes      Patient agrees, verbalizes understanding, and wants to proceed: yes      Educational handouts given: yes      Instructions and paperwork completed: yes    Universal protocol:     Patient states understanding of procedure being performed: yes      Relevant documents present and verified: yes      Test results available and properly labeled: yes      Required blood products, implants, devices, and special equipment available: yes    Procedure:     Negative urine pregnancy test: yes      Cervix cleaned and prepped: yes      Speculum placed in vagina: yes      Uterus sounded: yes      IUD inserted with no complications: yes      IUD type:  Mirena    Strings trimmed: yes      Uterus sound depth (cm):  7  Post-procedure:     Patient tolerated procedure well: yes      Patient will follow up after next period: yes    Comments:      Patient had vasovagal episode following procedure with vomiting and dizziness  She did not have syncope  She recovered well after laying down for a few minutes and taking ibuprofen  Consult obtained by Dr Savannah Eduardo    Did administer ibuprofen however I could not enter it as being administered due to EMR issue      Hermes Maldonado MD  01/29/19

## 2019-02-05 DIAGNOSIS — F33.9 MAJOR DEPRESSION, RECURRENT, CHRONIC (HCC): ICD-10-CM

## 2019-02-05 RX ORDER — FLUOXETINE HYDROCHLORIDE 60 MG/1
60 TABLET, FILM COATED ORAL; ORAL DAILY
Qty: 90 TABLET | Refills: 2 | Status: SHIPPED | OUTPATIENT
Start: 2019-02-05 | End: 2020-08-18

## 2019-09-18 ENCOUNTER — HOSPITAL ENCOUNTER (EMERGENCY)
Facility: HOSPITAL | Age: 24
Discharge: HOME | End: 2019-09-19
Attending: EMERGENCY MEDICINE | Admitting: EMERGENCY MEDICINE
Payer: MEDICARE

## 2019-09-18 DIAGNOSIS — T74.21XA SEXUAL ASSAULT OF ADULT, INITIAL ENCOUNTER: Primary | ICD-10-CM

## 2019-09-18 PROCEDURE — 99285 EMERGENCY DEPT VISIT HI MDM: CPT

## 2019-09-19 VITALS
RESPIRATION RATE: 18 BRPM | OXYGEN SATURATION: 98 % | TEMPERATURE: 97.9 F | SYSTOLIC BLOOD PRESSURE: 140 MMHG | DIASTOLIC BLOOD PRESSURE: 70 MMHG | HEART RATE: 100 BPM

## 2019-09-19 LAB — ETHANOL EXG-MCNC: 0 MG/DL

## 2019-09-19 PROCEDURE — 99285 EMERGENCY DEPT VISIT HI MDM: CPT | Performed by: EMERGENCY MEDICINE

## 2019-09-19 PROCEDURE — 82075 ASSAY OF BREATH ETHANOL: CPT | Performed by: EMERGENCY MEDICINE

## 2019-09-19 PROCEDURE — 96372 THER/PROPH/DIAG INJ SC/IM: CPT

## 2019-09-19 RX ORDER — AZITHROMYCIN 250 MG/1
1000 TABLET, FILM COATED ORAL ONCE
Status: COMPLETED | OUTPATIENT
Start: 2019-09-19 | End: 2019-09-19

## 2019-09-19 RX ORDER — METRONIDAZOLE 500 MG/1
2000 TABLET ORAL ONCE
Status: COMPLETED | OUTPATIENT
Start: 2019-09-19 | End: 2019-09-19

## 2019-09-19 RX ORDER — METOCLOPRAMIDE 10 MG/1
10 TABLET ORAL ONCE
Status: COMPLETED | OUTPATIENT
Start: 2019-09-19 | End: 2019-09-19

## 2019-09-19 RX ADMIN — AZITHROMYCIN 1000 MG: 250 TABLET, FILM COATED ORAL at 04:38

## 2019-09-19 RX ADMIN — LIDOCAINE HYDROCHLORIDE 250 MG: 10 INJECTION, SOLUTION EPIDURAL; INFILTRATION; INTRACAUDAL; PERINEURAL at 04:38

## 2019-09-19 RX ADMIN — METOCLOPRAMIDE HYDROCHLORIDE 10 MG: 10 TABLET ORAL at 04:38

## 2019-09-19 RX ADMIN — METRONIDAZOLE 2000 MG: 500 TABLET, FILM COATED ORAL at 04:38

## 2019-09-19 NOTE — ED NOTES
Pt updated on plan of care at this time, apologized on wait time for SANE nurse and informed pt that one has been contacted  Pt resting comfortably at this time, no signs of distress  Will continue to monitor         Meet Travis RN  09/18/19 [id number]

## 2019-09-19 NOTE — ED NOTES
TEODORO Camilo nurse at [address] [place] [address], has called department, and will come in early to provide SANE Exam   Pt  Has requested for patient advocate         Taqueria Arriaga RN  09/19/19 [id number]
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
## 2019-09-19 NOTE — ED NOTES
Charge RN able to obtain schedule for TEODORO nurse  On 09/18/2019, there is no TEODORO nurse scheduled  Pt  To be transferred to [facility]  Pt  Refuses transfer at this time  Pt  Informed there is on-call TEODORO RN at [address] [place] [address], and agrees to wait on their arrival to ED  Visitor marychuy Phipps RN  09/19/19 5527
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
## 2019-09-19 NOTE — ED NOTES
On call [facility] nurse called at this time  Awaiting call back  Dr Peri Rivera made aware          Erika Dela Cruz, RN  09/19/19 8598

## 2019-09-19 NOTE — ED NOTES
On call TEODORO LAWTON called, left message, awaiting call back  Dr Radu Parsons made aware           Ole Mejia RN  09/19/19 9355

## 2019-09-19 NOTE — ED NOTES
[facility], [facility], made aware we are unable to contact on call TEODORO taylor  Will attempt to contact        Shelly King RN  09/19/19 4253

## 2020-04-16 ENCOUNTER — TELEMEDICINE (OUTPATIENT)
Dept: FAMILY MEDICINE CLINIC | Facility: CLINIC | Age: 25
End: 2020-04-16

## 2020-04-16 DIAGNOSIS — G44.209 TENSION HEADACHE: Primary | ICD-10-CM

## 2020-04-16 DIAGNOSIS — F33.9 MAJOR DEPRESSION, RECURRENT, CHRONIC (HCC): ICD-10-CM

## 2020-04-16 PROCEDURE — 99213 OFFICE O/P EST LOW 20 MIN: CPT | Performed by: FAMILY MEDICINE

## 2020-04-16 RX ORDER — METHOCARBAMOL 500 MG/1
500 TABLET, FILM COATED ORAL 3 TIMES DAILY PRN
Qty: 30 TABLET | Refills: 0 | Status: SHIPPED | OUTPATIENT
Start: 2020-04-16 | End: 2020-09-22 | Stop reason: SDUPTHER

## 2020-04-16 RX ORDER — IBUPROFEN 400 MG/1
400 TABLET ORAL EVERY 4 HOURS PRN
Qty: 30 TABLET | Refills: 0 | Status: SHIPPED | OUTPATIENT
Start: 2020-04-16 | End: 2020-08-18 | Stop reason: CLARIF

## 2020-05-20 ENCOUNTER — TELEPHONE (OUTPATIENT)
Dept: FAMILY MEDICINE CLINIC | Facility: CLINIC | Age: 25
End: 2020-05-20

## 2020-05-21 ENCOUNTER — OFFICE VISIT (OUTPATIENT)
Dept: FAMILY MEDICINE CLINIC | Facility: CLINIC | Age: 25
End: 2020-05-21

## 2020-05-21 ENCOUNTER — APPOINTMENT (OUTPATIENT)
Dept: LAB | Facility: HOSPITAL | Age: 25
End: 2020-05-21
Payer: MEDICARE

## 2020-05-21 VITALS
DIASTOLIC BLOOD PRESSURE: 80 MMHG | WEIGHT: 221.8 LBS | SYSTOLIC BLOOD PRESSURE: 130 MMHG | RESPIRATION RATE: 16 BRPM | HEART RATE: 76 BPM | BODY MASS INDEX: 40.82 KG/M2 | TEMPERATURE: 98 F | HEIGHT: 62 IN

## 2020-05-21 DIAGNOSIS — Z11.3 SCREENING EXAMINATION FOR STD (SEXUALLY TRANSMITTED DISEASE): ICD-10-CM

## 2020-05-21 DIAGNOSIS — R30.0 DYSURIA: ICD-10-CM

## 2020-05-21 DIAGNOSIS — Z11.59 NEED FOR HEPATITIS B SCREENING TEST: ICD-10-CM

## 2020-05-21 DIAGNOSIS — R30.0 DYSURIA: Primary | ICD-10-CM

## 2020-05-21 LAB
BACTERIA UR QL AUTO: ABNORMAL /HPF
BILIRUB UR QL STRIP: NEGATIVE
CLARITY UR: ABNORMAL
COLOR UR: YELLOW
GLUCOSE UR STRIP-MCNC: NEGATIVE MG/DL
HBV SURFACE AG SER QL: NORMAL
HCV AB SER QL: NORMAL
HGB UR QL STRIP.AUTO: NEGATIVE
KETONES UR STRIP-MCNC: NEGATIVE MG/DL
LEUKOCYTE ESTERASE UR QL STRIP: ABNORMAL
NITRITE UR QL STRIP: NEGATIVE
NON-SQ EPI CELLS URNS QL MICRO: ABNORMAL /HPF
OTHER STN SPEC: ABNORMAL
PH UR STRIP.AUTO: 7.5 [PH]
PROT UR STRIP-MCNC: NEGATIVE MG/DL
RBC #/AREA URNS AUTO: ABNORMAL /HPF
SL AMB  POCT GLUCOSE, UA: NEGATIVE
SL AMB LEUKOCYTE ESTERASE,UA: ABNORMAL
SL AMB POCT BILIRUBIN,UA: NEGATIVE
SL AMB POCT BLOOD,UA: NEGATIVE
SL AMB POCT CLARITY,UA: CLEAR
SL AMB POCT COLOR,UA: YELLOW
SL AMB POCT KETONES,UA: NEGATIVE
SL AMB POCT NITRITE,UA: NEGATIVE
SL AMB POCT PH,UA: 7.5
SL AMB POCT SPECIFIC GRAVITY,UA: 1
SL AMB POCT URINE PROTEIN: NEGATIVE
SL AMB POCT UROBILINOGEN: 0.2
SP GR UR STRIP.AUTO: 1.01 (ref 1–1.03)
UROBILINOGEN UR QL STRIP.AUTO: 0.2 E.U./DL
WBC #/AREA URNS AUTO: ABNORMAL /HPF

## 2020-05-21 PROCEDURE — 87591 N.GONORRHOEAE DNA AMP PROB: CPT | Performed by: FAMILY MEDICINE

## 2020-05-21 PROCEDURE — 86592 SYPHILIS TEST NON-TREP QUAL: CPT

## 2020-05-21 PROCEDURE — 3008F BODY MASS INDEX DOCD: CPT | Performed by: FAMILY MEDICINE

## 2020-05-21 PROCEDURE — 86803 HEPATITIS C AB TEST: CPT

## 2020-05-21 PROCEDURE — 99213 OFFICE O/P EST LOW 20 MIN: CPT | Performed by: FAMILY MEDICINE

## 2020-05-21 PROCEDURE — 1036F TOBACCO NON-USER: CPT | Performed by: FAMILY MEDICINE

## 2020-05-21 PROCEDURE — 81002 URINALYSIS NONAUTO W/O SCOPE: CPT | Performed by: FAMILY MEDICINE

## 2020-05-21 PROCEDURE — 36415 COLL VENOUS BLD VENIPUNCTURE: CPT

## 2020-05-21 PROCEDURE — 87491 CHLMYD TRACH DNA AMP PROBE: CPT | Performed by: FAMILY MEDICINE

## 2020-05-21 PROCEDURE — 87340 HEPATITIS B SURFACE AG IA: CPT

## 2020-05-21 PROCEDURE — 87086 URINE CULTURE/COLONY COUNT: CPT | Performed by: FAMILY MEDICINE

## 2020-05-21 PROCEDURE — 87147 CULTURE TYPE IMMUNOLOGIC: CPT | Performed by: FAMILY MEDICINE

## 2020-05-21 PROCEDURE — 87389 HIV-1 AG W/HIV-1&-2 AB AG IA: CPT

## 2020-05-21 PROCEDURE — 81001 URINALYSIS AUTO W/SCOPE: CPT | Performed by: FAMILY MEDICINE

## 2020-05-21 RX ORDER — SULFAMETHOXAZOLE AND TRIMETHOPRIM 800; 160 MG/1; MG/1
1 TABLET ORAL 2 TIMES DAILY
Qty: 6 TABLET | Refills: 0 | Status: SHIPPED | OUTPATIENT
Start: 2020-05-21 | End: 2020-05-24

## 2020-05-22 ENCOUNTER — TELEPHONE (OUTPATIENT)
Dept: FAMILY MEDICINE CLINIC | Facility: CLINIC | Age: 25
End: 2020-05-22

## 2020-05-22 DIAGNOSIS — A59.9 TRICHOMONOSIS: ICD-10-CM

## 2020-05-22 DIAGNOSIS — A59.9 TRICHOMONIASIS: Primary | ICD-10-CM

## 2020-05-22 LAB
C TRACH DNA SPEC QL NAA+PROBE: NEGATIVE
HIV 1+2 AB+HIV1 P24 AG SERPL QL IA: NORMAL
N GONORRHOEA DNA SPEC QL NAA+PROBE: NEGATIVE
RPR SER QL: NORMAL

## 2020-05-22 RX ORDER — METRONIDAZOLE 500 MG/1
500 TABLET ORAL EVERY 12 HOURS SCHEDULED
Qty: 14 TABLET | Refills: 0 | Status: SHIPPED | OUTPATIENT
Start: 2020-05-22 | End: 2020-05-29

## 2020-05-24 LAB
BACTERIA UR CULT: ABNORMAL
BACTERIA UR CULT: ABNORMAL

## 2020-05-26 ENCOUNTER — OFFICE VISIT (OUTPATIENT)
Dept: FAMILY MEDICINE CLINIC | Facility: CLINIC | Age: 25
End: 2020-05-26

## 2020-05-26 ENCOUNTER — TELEPHONE (OUTPATIENT)
Dept: FAMILY MEDICINE CLINIC | Facility: CLINIC | Age: 25
End: 2020-05-26

## 2020-05-26 VITALS
DIASTOLIC BLOOD PRESSURE: 80 MMHG | SYSTOLIC BLOOD PRESSURE: 138 MMHG | HEIGHT: 62 IN | HEART RATE: 76 BPM | WEIGHT: 218 LBS | RESPIRATION RATE: 16 BRPM | BODY MASS INDEX: 40.12 KG/M2 | TEMPERATURE: 98.3 F

## 2020-05-26 DIAGNOSIS — R30.0 DYSURIA: Primary | ICD-10-CM

## 2020-05-26 DIAGNOSIS — A59.9 TRICHOMONIASIS: ICD-10-CM

## 2020-05-26 PROCEDURE — 99213 OFFICE O/P EST LOW 20 MIN: CPT | Performed by: FAMILY MEDICINE

## 2020-05-26 PROCEDURE — 1036F TOBACCO NON-USER: CPT | Performed by: FAMILY MEDICINE

## 2020-05-28 PROBLEM — A59.9 TRICHOMONIASIS: Status: ACTIVE | Noted: 2020-05-28

## 2020-07-30 ENCOUNTER — ANNUAL EXAM (OUTPATIENT)
Dept: FAMILY MEDICINE CLINIC | Facility: CLINIC | Age: 25
End: 2020-07-30

## 2020-07-30 VITALS
BODY MASS INDEX: 40.06 KG/M2 | HEART RATE: 68 BPM | TEMPERATURE: 98.5 F | RESPIRATION RATE: 16 BRPM | WEIGHT: 219 LBS | DIASTOLIC BLOOD PRESSURE: 82 MMHG | SYSTOLIC BLOOD PRESSURE: 132 MMHG

## 2020-07-30 DIAGNOSIS — Z12.4 CERVICAL CANCER SCREENING: ICD-10-CM

## 2020-07-30 DIAGNOSIS — Z11.3 ROUTINE SCREENING FOR STI (SEXUALLY TRANSMITTED INFECTION): Primary | ICD-10-CM

## 2020-07-30 DIAGNOSIS — E66.01 CLASS 3 SEVERE OBESITY DUE TO EXCESS CALORIES IN ADULT, UNSPECIFIED BMI, UNSPECIFIED WHETHER SERIOUS COMORBIDITY PRESENT (HCC): ICD-10-CM

## 2020-07-30 PROBLEM — Z01.419 ENCOUNTER FOR ANNUAL ROUTINE GYNECOLOGICAL EXAMINATION: Status: ACTIVE | Noted: 2018-10-09

## 2020-07-30 PROBLEM — E66.813 CLASS 3 SEVERE OBESITY IN ADULT (HCC): Status: ACTIVE | Noted: 2020-07-30

## 2020-07-30 PROCEDURE — 87480 CANDIDA DNA DIR PROBE: CPT | Performed by: FAMILY MEDICINE

## 2020-07-30 PROCEDURE — G0145 SCR C/V CYTO,THINLAYER,RESCR: HCPCS | Performed by: FAMILY MEDICINE

## 2020-07-30 PROCEDURE — 87491 CHLMYD TRACH DNA AMP PROBE: CPT | Performed by: FAMILY MEDICINE

## 2020-07-30 PROCEDURE — 87660 TRICHOMONAS VAGIN DIR PROBE: CPT | Performed by: FAMILY MEDICINE

## 2020-07-30 PROCEDURE — 87510 GARDNER VAG DNA DIR PROBE: CPT | Performed by: FAMILY MEDICINE

## 2020-07-30 PROCEDURE — 87591 N.GONORRHOEAE DNA AMP PROB: CPT | Performed by: FAMILY MEDICINE

## 2020-07-30 PROCEDURE — G0101 CA SCREEN;PELVIC/BREAST EXAM: HCPCS | Performed by: FAMILY MEDICINE

## 2020-07-30 NOTE — PATIENT INSTRUCTIONS

## 2020-07-30 NOTE — ASSESSMENT & PLAN NOTE
BMI Counseling: Body mass index is 40 06 kg/m²  The BMI is above normal  Nutrition recommendations include reducing portion sizes, decreasing overall calorie intake, 3-5 servings of fruits/vegetables daily, reducing fast food intake, consuming healthier snacks, decreasing soda and/or juice intake, increasing intake of lean protein and reducing intake of cholesterol  Exercise recommendations include exercising 3-5 times per week
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
## 2020-08-02 LAB
C TRACH DNA SPEC QL NAA+PROBE: NEGATIVE
CANDIDA RRNA VAG QL PROBE: NEGATIVE
G VAGINALIS RRNA GENITAL QL PROBE: NEGATIVE
N GONORRHOEA DNA SPEC QL NAA+PROBE: NEGATIVE
T VAGINALIS RRNA GENITAL QL PROBE: NEGATIVE

## 2020-08-05 LAB
LAB AP GYN PRIMARY INTERPRETATION: NORMAL
Lab: NORMAL
PATH INTERP SPEC-IMP: NORMAL

## 2020-08-07 ENCOUNTER — TELEPHONE (OUTPATIENT)
Dept: FAMILY MEDICINE CLINIC | Facility: CLINIC | Age: 25
End: 2020-08-07

## 2020-08-07 NOTE — TELEPHONE ENCOUNTER
Jase Gonzalez the case manger called requesting a call back with patients results for chlamydia and vaginosis DNA test that were done on 7/30/2020   Jase Gonzalez can be reach at [phone]

## 2020-08-16 NOTE — PROGRESS NOTES
[facility]        NAME: Valencia Reed is a 20 y o  female  : 1995    MRN: [id number]  DATE: 2018  TIME: 4:37 PM    Assessment and Plan   Strep pharyngitis [J02 0]  1  Strep pharyngitis  POCT rapid strepA    amoxicillin (AMOXIL) 500 MG tablet    CANCELED: POCT rapid strepA    CANCELED: Throat culture   2  Upper respiratory infection, viral  brompheniramine-pseudoephedrine-DM 30-2-10 MG/5ML syrup    fluticasone (FLONASE) 50 mcg/act nasal spray    DISCONTINUED: brompheniramine-pseudoephedrine-DM 30-2-10 MG/5ML syrup    DISCONTINUED: fluticasone (FLONASE) 50 mcg/act nasal spray         Patient Instructions   Rapid strep positive  Prescription sent to pharmacy for amoxicillin-use as directed  Prescriptions sent to pharmacy for Bromfed and Flonase-use as directed for symptoms  Saline nasal spray, cool mist humidifier, saltwater gargles  Tylenol/ibuprofen as needed for fever or pain  Follow up with PCP in 3-5 days  Proceed to  ER if symptoms worsen  Chief Complaint     Chief Complaint   Patient presents with    Cold Like Symptoms     X 1 week - nasal congestion, congested cough, R ear pain, sore throat and chills  Took Advil at 8 am today   Cough    Sore Throat    Earache         History of Present Illness   The patient is a 75-year-old female who presents with cold like symptoms for a little under 1 week  Positive nasal and sinus congestion  Negative sinus point tenderness  Negative headache  Positive bilateral ear pressure without drainage  Positive sore throat and postnasal drip  Nonproductive cough  She denies shortness of breath or wheezing  Positive diarrhea  Negative abdominal pain, nausea, vomiting  Negative myalgias  She states that she has not been taking any medication for her symptoms  HPI    Review of Systems   Review of Systems   Constitutional: Positive for activity change, chills and fatigue  Negative for fever     HENT: Positive for congestion, ear pain (Bilateral pressure), postnasal drip, rhinorrhea, sinus pressure and sore throat  Negative for ear discharge, facial swelling, mouth sores, sinus pain, sneezing and trouble swallowing  Eyes: Negative for pain, discharge, redness and itching  Respiratory: Positive for cough  Negative for apnea, chest tightness, shortness of breath, wheezing and stridor  Cardiovascular: Negative for chest pain  Gastrointestinal: Positive for diarrhea  Negative for abdominal distention, abdominal pain, nausea and vomiting  Genitourinary: Negative for difficulty urinating  Musculoskeletal: Negative for arthralgias and myalgias  Skin: Negative for pallor and rash  Allergic/Immunologic: Negative  Neurological: Negative for dizziness, light-headedness and headaches  Hematological: Negative  Psychiatric/Behavioral: Negative  All other systems reviewed and are negative          Current Medications       Current Outpatient Prescriptions:     FLUoxetine (PROzac) 60 MG TABS, Take 1 tablet (60 mg total) by mouth daily, Disp: 90 tablet, Rfl: 2    amoxicillin (AMOXIL) 500 MG tablet, Take 1 tablet (500 mg total) by mouth 2 (two) times a day for 10 days, Disp: 20 tablet, Rfl: 0    brompheniramine-pseudoephedrine-DM 30-2-10 MG/5ML syrup, Take 5 mL by mouth 4 (four) times a day as needed for cough, Disp: 118 mL, Rfl: 0    fluticasone (FLONASE) 50 mcg/act nasal spray, 1 spray into each nostril daily, Disp: 16 g, Rfl: 0    Current Facility-Administered Medications:     medroxyPROGESTERone (DEPO-PROVERA) IM injection 150 mg, 150 mg, Intramuscular, Q3 Months, Cami Augustine MD, 150 mg at 09/17/18 1410    Current Allergies     Allergies as of 11/16/2018    (No Known Allergies)            The following portions of the patient's history were reviewed and updated as appropriate: allergies, current medications, past family history, past medical history, past social history, past surgical history and problem list      Past Medical History:   Diagnosis Date    Altered level of consciousness     Depression     Intellectual disability        Past Surgical History:   Procedure Laterality Date    NO PAST SURGERIES         Family History   Problem Relation Age of Onset    Adopted: Yes    No Known Problems Mother     No Known Problems Father          Medications have been verified  Objective   /78 (BP Location: Right arm, Patient Position: Sitting, Cuff Size: Standard)   Pulse 74   Temp 98 5 °F (36 9 °C) (Oral)   Resp 18   Ht 5' 3" (1 6 m)   Wt 89 4 kg (197 lb 3 2 oz)   SpO2 98%   BMI 34 93 kg/m²        Physical Exam     Physical Exam   Constitutional: She is oriented to person, place, and time  She appears well-developed and well-nourished  No distress  HENT:   Head: Normocephalic and atraumatic  Right Ear: Hearing, tympanic membrane, external ear and ear canal normal  No lacerations  No drainage, swelling or tenderness  No foreign bodies  No mastoid tenderness  Tympanic membrane is not injected, not scarred, not perforated, not erythematous, not retracted and not bulging  No middle ear effusion  No hemotympanum  No decreased hearing is noted  Left Ear: Hearing, tympanic membrane, external ear and ear canal normal  No lacerations  No drainage, swelling or tenderness  No foreign bodies  No mastoid tenderness  Tympanic membrane is not injected, not scarred, not perforated, not erythematous, not retracted and not bulging  No middle ear effusion  No hemotympanum  No decreased hearing is noted  Nose: Rhinorrhea present  No mucosal edema or septal deviation  Right sinus exhibits no maxillary sinus tenderness and no frontal sinus tenderness  Left sinus exhibits no maxillary sinus tenderness and no frontal sinus tenderness  Mouth/Throat: Uvula is midline and mucous membranes are normal  No oral lesions  No dental abscesses or uvula swelling  Posterior oropharyngeal erythema present   No oropharyngeal exudate, posterior oropharyngeal edema or tonsillar abscesses  Eyes: Pupils are equal, round, and reactive to light  Conjunctivae and EOM are normal    Neck: Trachea normal, normal range of motion and full passive range of motion without pain  Neck supple  No JVD present  No edema and no erythema present  No thyromegaly present  Cardiovascular: Normal rate, regular rhythm, S1 normal, S2 normal, normal heart sounds, intact distal pulses and normal pulses  No murmur heard  Pulmonary/Chest: Effort normal and breath sounds normal  No accessory muscle usage or stridor  No respiratory distress  She has no decreased breath sounds  She has no wheezes  She has no rhonchi  She has no rales  Abdominal: Soft  Normal appearance and bowel sounds are normal  She exhibits no distension  There is no hepatosplenomegaly  There is no tenderness  Musculoskeletal: Normal range of motion  She exhibits no edema  Neurological: She is alert and oriented to person, place, and time  Skin: Skin is warm, dry and intact  No abrasion, no lesion and no rash noted  She is not diaphoretic  No cyanosis or erythema  Nails show no clubbing  Psychiatric: She has a normal mood and affect  Her speech is normal and behavior is normal    Nursing note and vitals reviewed  normal

## 2020-08-18 ENCOUNTER — OFFICE VISIT (OUTPATIENT)
Dept: FAMILY MEDICINE CLINIC | Facility: CLINIC | Age: 25
End: 2020-08-18

## 2020-08-18 VITALS
DIASTOLIC BLOOD PRESSURE: 70 MMHG | TEMPERATURE: 98.8 F | WEIGHT: 218 LBS | SYSTOLIC BLOOD PRESSURE: 130 MMHG | BODY MASS INDEX: 40.12 KG/M2 | HEIGHT: 62 IN | RESPIRATION RATE: 16 BRPM | HEART RATE: 72 BPM

## 2020-08-18 DIAGNOSIS — F32.0 CURRENT MILD EPISODE OF MAJOR DEPRESSIVE DISORDER, UNSPECIFIED WHETHER RECURRENT (HCC): Primary | ICD-10-CM

## 2020-08-18 PROCEDURE — 3008F BODY MASS INDEX DOCD: CPT | Performed by: FAMILY MEDICINE

## 2020-08-18 PROCEDURE — 99213 OFFICE O/P EST LOW 20 MIN: CPT | Performed by: FAMILY MEDICINE

## 2020-08-18 PROCEDURE — 1036F TOBACCO NON-USER: CPT | Performed by: FAMILY MEDICINE

## 2020-08-18 RX ORDER — FLUOXETINE HYDROCHLORIDE 40 MG/1
40 CAPSULE ORAL DAILY
Qty: 90 CAPSULE | Refills: 3 | Status: SHIPPED | OUTPATIENT
Start: 2020-08-18 | End: 2022-02-10 | Stop reason: ALTCHOICE

## 2020-08-18 NOTE — ASSESSMENT & PLAN NOTE
Will restart Prozac at 40 mg instead of 60 mg daily (patient was not adherent to  medication)  Ensure better compliance  Discussed behavioral interventions (exercise, diet, regular sleep-wake cycle)  Will order TSH  Referral to therapy provided  I made a verbal agreement with pt and caregiver in the case that she experiences suicidal intent with plan to call and go to the ED   Follow-up in 1 month  RTC precautions discussed
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
## 2020-08-18 NOTE — PATIENT INSTRUCTIONS
Depression   WHAT YOU NEED TO KNOW:   Depression is a medical condition that causes feelings of sadness or hopelessness that do not go away  Depression may cause you to lose interest in things you used to enjoy  These feelings may interfere with your daily life  DISCHARGE INSTRUCTIONS:   Call 911 for any of the following:   · You think about harming yourself or someone else  Contact your healthcare provider if:   · Your symptoms do not improve  · You cannot make it to your next appointment  · You have new symptoms  · You have questions or concerns about your condition or care  Medicines:   · Antidepressants  may be given to improve or balance your mood  You may need to take this medicine for several weeks before you begin to feel better  · Take your medicine as directed  Contact your healthcare provider if you think your medicine is not helping or if you have side effects  Tell him of her if you are allergic to any medicine  Keep a list of the medicines, vitamins, and herbs you take  Include the amounts, and when and why you take them  Bring the list or the pill bottles to follow-up visits  Carry your medicine list with you in case of an emergency  Therapy  may be used to treat your depression  A therapist will help you learn to cope with your thoughts and feelings  This can be done alone or in a group  It may also be done with family members or a significant other  Self-care:   · Get regular physical activity  Try to exercise for 30 minutes, 3 to 5 days a week  Work with your healthcare provider to develop an exercise plan that you enjoy  Physical activity may improve your symptoms  · Get enough sleep  Create a routine to help you relax before bed  You can listen to music, read, or do yoga  Try to go to bed and wake up at the same time every day  Sleep is important for emotional health  · Eat a variety of healthy foods from all of the food groups    A healthy meal plan is low in fat, salt, and added sugar  Ask your healthcare provider for more information about a meal plan that is right for you  · Do not drink alcohol or use drugs  Alcohol and drugs can make your symptoms worse  Follow up with your healthcare provider as directed: Your healthcare provider will monitor your progress at follow-up visits  He or she will also monitor your medicine if you take antidepressants  Your healthcare provider will ask if the medicine is helping  Tell him or her about any side effects or problems you may have with your medicine  The type or amount of medicine may need to be changed  Write down your questions so you remember to ask them during your visits  © 2017 [employer] Information is for End User's use only and may not be sold, redistributed or otherwise used for commercial purposes  All illustrations and images included in CareNotes® are the copyrighted property of [employer]  or [employer]  The above information is an  only  It is not intended as medical advice for individual conditions or treatments  Talk to your doctor, nurse or pharmacist before following any medical regimen to see if it is safe and effective for you

## 2020-08-20 ENCOUNTER — TRANSCRIBE ORDERS (OUTPATIENT)
Dept: FAMILY MEDICINE CLINIC | Facility: CLINIC | Age: 25
End: 2020-08-20

## 2020-08-20 DIAGNOSIS — F32.0 CURRENT MILD EPISODE OF MAJOR DEPRESSIVE DISORDER, UNSPECIFIED WHETHER RECURRENT (HCC): Primary | ICD-10-CM

## 2020-08-26 ENCOUNTER — PATIENT OUTREACH (OUTPATIENT)
Dept: FAMILY MEDICINE CLINIC | Facility: CLINIC | Age: 25
End: 2020-08-26

## 2020-09-14 ENCOUNTER — TELEPHONE (OUTPATIENT)
Dept: FAMILY MEDICINE CLINIC | Facility: CLINIC | Age: 25
End: 2020-09-14

## 2020-09-15 ENCOUNTER — APPOINTMENT (OUTPATIENT)
Dept: LAB | Facility: HOSPITAL | Age: 25
End: 2020-09-15
Payer: MEDICARE

## 2020-09-15 DIAGNOSIS — F32.0 CURRENT MILD EPISODE OF MAJOR DEPRESSIVE DISORDER, UNSPECIFIED WHETHER RECURRENT (HCC): ICD-10-CM

## 2020-09-15 LAB — TSH SERPL DL<=0.05 MIU/L-ACNC: 2.78 UIU/ML (ref 0.36–3.74)

## 2020-09-15 PROCEDURE — 36415 COLL VENOUS BLD VENIPUNCTURE: CPT

## 2020-09-15 PROCEDURE — 84443 ASSAY THYROID STIM HORMONE: CPT

## 2020-09-22 ENCOUNTER — OFFICE VISIT (OUTPATIENT)
Dept: FAMILY MEDICINE CLINIC | Facility: CLINIC | Age: 25
End: 2020-09-22

## 2020-09-22 ENCOUNTER — TRANSCRIBE ORDERS (OUTPATIENT)
Dept: FAMILY MEDICINE CLINIC | Facility: CLINIC | Age: 25
End: 2020-09-22

## 2020-09-22 VITALS
DIASTOLIC BLOOD PRESSURE: 78 MMHG | SYSTOLIC BLOOD PRESSURE: 110 MMHG | HEIGHT: 62 IN | TEMPERATURE: 99.2 F | HEART RATE: 100 BPM | WEIGHT: 214.8 LBS | RESPIRATION RATE: 16 BRPM | BODY MASS INDEX: 39.53 KG/M2

## 2020-09-22 DIAGNOSIS — F32.0 CURRENT MILD EPISODE OF MAJOR DEPRESSIVE DISORDER, UNSPECIFIED WHETHER RECURRENT (HCC): Primary | ICD-10-CM

## 2020-09-22 DIAGNOSIS — Z23 ENCOUNTER FOR IMMUNIZATION: Primary | ICD-10-CM

## 2020-09-22 DIAGNOSIS — G44.209 TENSION HEADACHE: ICD-10-CM

## 2020-09-22 PROCEDURE — 99213 OFFICE O/P EST LOW 20 MIN: CPT | Performed by: FAMILY MEDICINE

## 2020-09-22 PROCEDURE — 90686 IIV4 VACC NO PRSV 0.5 ML IM: CPT | Performed by: FAMILY MEDICINE

## 2020-09-22 PROCEDURE — G0008 ADMIN INFLUENZA VIRUS VAC: HCPCS | Performed by: FAMILY MEDICINE

## 2020-09-22 RX ORDER — IBUPROFEN 600 MG/1
600 TABLET ORAL EVERY 6 HOURS PRN
Qty: 30 TABLET | Refills: 0 | Status: SHIPPED | OUTPATIENT
Start: 2020-09-22 | End: 2020-11-20 | Stop reason: SDUPTHER

## 2020-09-22 RX ORDER — METHOCARBAMOL 500 MG/1
500 TABLET, FILM COATED ORAL 3 TIMES DAILY PRN
Qty: 30 TABLET | Refills: 0 | Status: SHIPPED | OUTPATIENT
Start: 2020-09-22 | End: 2021-08-30 | Stop reason: ALTCHOICE

## 2020-09-22 NOTE — PROGRESS NOTES
Assessment/Plan:    Depression  -Patient feels at baseline today  -Patient has been taking Prozac 40 mg for one week, which is not enough time to assess its effectiveness  -Continue Prozac 40 mg  Possible side effects discussed, including fatigue, decreased appetite, decreased libido, and SIADH  -Contact number updated to 's phone number  Social work will call again to schedule behavioral therapy visit  -TSH level normal at 2 78  -Follow-up at previously scheduled 10/29/20 annual wellness visit      Tension headache  -Patient feels these headaches are no better nor worse from previously  -Headache is associated with muscle spasms  -Headaches are well controlled with Motrin and robaxin  Patient states she takes robaxin approximately once per week  -Motrin 600 mg and robaxin 500 mg refilled at patients request  -Patient counseled on side effects of robaxin, including fatigue  After this robaxin prescription is finished, consider switching to Flexeril       Diagnoses and all orders for this visit:    Encounter for immunization  -     influenza vaccine, quadrivalent, 0 5 mL, preservative-free, for adult and pediatric patients 6 mos+ (AFLURIA, FLUARIX, FLULAVAL, FLUZONE)    Tension headache  -     methocarbamol (ROBAXIN) 500 mg tablet; Take 1 tablet (500 mg total) by mouth 3 (three) times a day as needed for muscle spasms  -     ibuprofen (MOTRIN) 600 mg tablet; Take 1 tablet (600 mg total) by mouth every 6 (six) hours as needed for mild pain          Subjective:      Patient ID: Jaciel Rivers is a 30 y o  female  She comes in today with her , Sean Durant, for a 4 week follow up of her depression, for which she was prescribed 40 mg fluoxetine and behavioral therapy at her last visit  She was previously prescribed 60 mg fluoxetine, but was inconsistent with taking it  Unfortunately, the pharmacy sent her medication to the wrong home address, and patient received it one week ago   She has been consistently taking fluoxetine over the past week  She notes increased fatigue since starting the medication; she feels tired during the day and sometimes takes naps  She also notes decreased appetite and increased thirst   She has had issues with scheduling her physical therapy appointment  She was not receiving the phone calls from social work  Her headaches remain at her baseline  She finds them adequately controlled with her current regimen of ibuprofen and robaxin  She received her flu vaccine today  The following portions of the patient's history were reviewed and updated as appropriate:   She  has a past medical history of Altered level of consciousness, Depression, and Intellectual disability  She   Patient Active Problem List    Diagnosis Date Noted    Cervical cancer screening 07/30/2020    Class 3 severe obesity in adult [facility]) 07/30/2020    Trichomoniasis 05/28/2020    Dysuria 05/21/2020    Encounter for annual routine gynecological examination 10/09/2018    Tension headache 10/09/2018    Encounter for counseling regarding contraception 05/08/2018    Major depression, recurrent, chronic ([facility] [address] ) 02/22/2018    Mild intellectual disability 08/22/2016    GERD (gastroesophageal reflux disease) 09/04/2015    Obesity 07/16/2015    Depression 12/08/2014    Fetal alcohol syndrome 12/08/2014     Current Outpatient Medications   Medication Sig Dispense Refill    FLUoxetine (PROzac) 40 MG capsule Take 1 capsule (40 mg total) by mouth daily 90 capsule 3    levonorgestrel (MIRENA) 20 MCG/24HR IUD 1 each by Intrauterine route once      methocarbamol (ROBAXIN) 500 mg tablet Take 1 tablet (500 mg total) by mouth 3 (three) times a day as needed for muscle spasms 30 tablet 0    ibuprofen (MOTRIN) 600 mg tablet Take 1 tablet (600 mg total) by mouth every 6 (six) hours as needed for mild pain 30 tablet 0     No current facility-administered medications for this visit           Review of Systems Constitutional: Positive for fatigue  Negative for chills, fever and unexpected weight change  HENT: Negative for congestion, hearing loss, rhinorrhea and sore throat  Eyes: Negative for visual disturbance  Respiratory: Negative for cough and shortness of breath  Cardiovascular: Negative for chest pain and palpitations  Gastrointestinal: Negative for abdominal pain, constipation and diarrhea  Genitourinary: Negative for dysuria and vaginal discharge  Musculoskeletal: Negative for arthralgias and myalgias  Neurological: Positive for headaches  Psychiatric/Behavioral: Negative for hallucinations and suicidal ideas  Objective:      /78 (BP Location: Left arm, Patient Position: Sitting, Cuff Size: Large)   Pulse 100   Temp 99 2 °F (37 3 °C) (Tympanic)   Resp 16   Ht 5' 2" (1 575 m)   Wt 97 4 kg (214 lb 12 8 oz)   BMI 39 29 kg/m²          Physical Exam  Constitutional:       Appearance: Normal appearance  HENT:      Head: Normocephalic and atraumatic  Eyes:      Extraocular Movements: Extraocular movements intact  Cardiovascular:      Rate and Rhythm: Normal rate and regular rhythm  Heart sounds: No murmur  Pulmonary:      Effort: Pulmonary effort is normal  No respiratory distress  Breath sounds: Normal breath sounds  No wheezing or rales  Musculoskeletal: Normal range of motion  Right lower leg: No edema  Left lower leg: No edema  Skin:     General: Skin is warm and dry  Neurological:      Mental Status: She is alert  Mental status is at baseline     Psychiatric:         Mood and Affect: Mood normal

## 2020-09-22 NOTE — ASSESSMENT & PLAN NOTE
-Patient feels these headaches are no better nor worse from previously  -Headache is associated with muscle spasms  -Headaches are well controlled with Motrin and robaxin  Patient states she takes robaxin approximately once per week  -Motrin 600 mg and robaxin 500 mg refilled at patients request  -Patient counseled on side effects of robaxin, including fatigue   After this robaxin prescription is finished, consider switching to Flexeril

## 2020-09-22 NOTE — ASSESSMENT & PLAN NOTE
-Patient feels at baseline today  -Patient has been taking Prozac 40 mg for one week, which is not enough time to assess its effectiveness  -Continue Prozac 40 mg  Possible side effects discussed, including fatigue, decreased appetite, decreased libido, and SIADH  -Contact number updated to 's phone number   Social work will call again to schedule behavioral therapy visit  -TSH level normal at 2 78  -Follow-up at previously scheduled 10/29/20 annual wellness visit

## 2020-09-23 ENCOUNTER — PATIENT OUTREACH (OUTPATIENT)
Dept: FAMILY MEDICINE CLINIC | Facility: CLINIC | Age: 25
End: 2020-09-23

## 2020-09-25 ENCOUNTER — PATIENT OUTREACH (OUTPATIENT)
Dept: FAMILY MEDICINE CLINIC | Facility: CLINIC | Age: 25
End: 2020-09-25
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
## 2020-09-30 ENCOUNTER — PATIENT OUTREACH (OUTPATIENT)
Dept: FAMILY MEDICINE CLINIC | Facility: CLINIC | Age: 25
End: 2020-09-30

## 2020-09-30 NOTE — PROGRESS NOTES
Chart reviewed and message sent to [facility] regarding contacting patient/Support Person Gilda Turner to schedule an appointment  Call to Gilda Turner who reports she has left a message at [facility] as well and has also left a message at [facility] but has not received a return call from either  [address] [place] [address] as an in network option for patient as well and Gilda Turner will consider  Will continue to be available to provide support

## 2020-10-01 ENCOUNTER — TELEPHONE (OUTPATIENT)
Dept: PSYCHIATRY | Facility: CLINIC | Age: 25
End: 2020-10-01

## 2020-10-07 ENCOUNTER — PATIENT OUTREACH (OUTPATIENT)
Dept: FAMILY MEDICINE CLINIC | Facility: CLINIC | Age: 25
End: 2020-10-07

## 2020-10-16 ENCOUNTER — PATIENT OUTREACH (OUTPATIENT)
Dept: FAMILY MEDICINE CLINIC | Facility: CLINIC | Age: 25
End: 2020-10-16

## 2020-11-20 ENCOUNTER — TELEMEDICINE (OUTPATIENT)
Dept: FAMILY MEDICINE CLINIC | Facility: CLINIC | Age: 25
End: 2020-11-20

## 2020-11-20 DIAGNOSIS — G44.209 TENSION HEADACHE: ICD-10-CM

## 2020-11-20 DIAGNOSIS — R19.7 DIARRHEA, UNSPECIFIED TYPE: ICD-10-CM

## 2020-11-20 DIAGNOSIS — G43.009 MIGRAINE WITHOUT AURA AND WITHOUT STATUS MIGRAINOSUS, NOT INTRACTABLE: Primary | ICD-10-CM

## 2020-11-20 PROCEDURE — G2025 DIS SITE TELE SVCS RHC/FQHC: HCPCS | Performed by: FAMILY MEDICINE

## 2020-11-20 RX ORDER — RIZATRIPTAN BENZOATE 5 MG/1
5 TABLET, ORALLY DISINTEGRATING ORAL ONCE AS NEEDED
Qty: 9 TABLET | Refills: 0 | Status: SHIPPED | OUTPATIENT
Start: 2020-11-20 | End: 2020-12-29 | Stop reason: SDUPTHER

## 2020-11-20 RX ORDER — IBUPROFEN 600 MG/1
600 TABLET ORAL EVERY 6 HOURS PRN
Qty: 30 TABLET | Refills: 0 | Status: SHIPPED | OUTPATIENT
Start: 2020-11-20 | End: 2020-12-01 | Stop reason: SDUPTHER

## 2020-11-20 RX ORDER — ONDANSETRON 4 MG/1
4 TABLET, ORALLY DISINTEGRATING ORAL EVERY 6 HOURS PRN
Qty: 20 TABLET | Refills: 0 | Status: SHIPPED | OUTPATIENT
Start: 2020-11-20 | End: 2022-01-14 | Stop reason: SDUPTHER

## 2020-11-24 ENCOUNTER — OFFICE VISIT (OUTPATIENT)
Dept: FAMILY MEDICINE CLINIC | Facility: CLINIC | Age: 25
End: 2020-11-24

## 2020-11-24 VITALS
HEART RATE: 58 BPM | HEIGHT: 62 IN | BODY MASS INDEX: 38.42 KG/M2 | OXYGEN SATURATION: 98 % | DIASTOLIC BLOOD PRESSURE: 72 MMHG | RESPIRATION RATE: 16 BRPM | WEIGHT: 208.8 LBS | TEMPERATURE: 97.7 F | SYSTOLIC BLOOD PRESSURE: 110 MMHG

## 2020-11-24 DIAGNOSIS — Z23 NEED FOR HEPATITIS B VACCINATION: ICD-10-CM

## 2020-11-24 DIAGNOSIS — Z00.00 INITIAL MEDICARE ANNUAL WELLNESS VISIT: Primary | ICD-10-CM

## 2020-11-24 PROCEDURE — 90746 HEPB VACCINE 3 DOSE ADULT IM: CPT | Performed by: FAMILY MEDICINE

## 2020-11-24 PROCEDURE — G0438 PPPS, INITIAL VISIT: HCPCS | Performed by: FAMILY MEDICINE

## 2020-11-24 PROCEDURE — G0010 ADMIN HEPATITIS B VACCINE: HCPCS | Performed by: FAMILY MEDICINE

## 2020-12-01 DIAGNOSIS — G44.209 TENSION HEADACHE: ICD-10-CM

## 2020-12-01 RX ORDER — IBUPROFEN 600 MG/1
600 TABLET ORAL EVERY 6 HOURS PRN
Qty: 30 TABLET | Refills: 1 | Status: SHIPPED | OUTPATIENT
Start: 2020-12-01 | End: 2020-12-29 | Stop reason: SDUPTHER

## 2020-12-09 ENCOUNTER — HOSPITAL ENCOUNTER (EMERGENCY)
Facility: HOSPITAL | Age: 25
Discharge: HOME | End: 2020-12-09
Attending: EMERGENCY MEDICINE | Admitting: EMERGENCY MEDICINE
Payer: MEDICARE

## 2020-12-09 VITALS
SYSTOLIC BLOOD PRESSURE: 129 MMHG | DIASTOLIC BLOOD PRESSURE: 66 MMHG | RESPIRATION RATE: 18 BRPM | TEMPERATURE: 99.2 F | OXYGEN SATURATION: 98 % | HEART RATE: 63 BPM

## 2020-12-09 DIAGNOSIS — B34.9 VIRAL SYNDROME: ICD-10-CM

## 2020-12-09 DIAGNOSIS — Z20.822 ENCOUNTER FOR LABORATORY TESTING FOR COVID-19 VIRUS: Primary | ICD-10-CM

## 2020-12-09 PROCEDURE — U0003 INFECTIOUS AGENT DETECTION BY NUCLEIC ACID (DNA OR RNA); SEVERE ACUTE RESPIRATORY SYNDROME CORONAVIRUS 2 (SARS-COV-2) (CORONAVIRUS DISEASE [COVID-19]), AMPLIFIED PROBE TECHNIQUE, MAKING USE OF HIGH THROUGHPUT TECHNOLOGIES AS DESCRIBED BY CMS-2020-01-R: HCPCS | Performed by: EMERGENCY MEDICINE

## 2020-12-09 PROCEDURE — 99282 EMERGENCY DEPT VISIT SF MDM: CPT | Performed by: EMERGENCY MEDICINE

## 2020-12-09 PROCEDURE — 99283 EMERGENCY DEPT VISIT LOW MDM: CPT

## 2020-12-10 ENCOUNTER — HOSPITAL ENCOUNTER (EMERGENCY)
Facility: HOSPITAL | Age: 25
Discharge: HOME | End: 2020-12-10
Attending: EMERGENCY MEDICINE | Admitting: EMERGENCY MEDICINE
Payer: MEDICARE

## 2020-12-10 ENCOUNTER — APPOINTMENT (EMERGENCY)
Dept: RADIOLOGY | Facility: HOSPITAL | Age: 25
End: 2020-12-10
Payer: MEDICARE

## 2020-12-10 VITALS
WEIGHT: 206.35 LBS | DIASTOLIC BLOOD PRESSURE: 68 MMHG | OXYGEN SATURATION: 98 % | SYSTOLIC BLOOD PRESSURE: 129 MMHG | TEMPERATURE: 98 F | HEART RATE: 86 BPM | RESPIRATION RATE: 18 BRPM | BODY MASS INDEX: 37.74 KG/M2
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
## 2020-12-10 DIAGNOSIS — Z23 NEED FOR RABIES VACCINATION: ICD-10-CM

## 2020-12-10 DIAGNOSIS — S61.451A CAT BITE OF RIGHT HAND, INITIAL ENCOUNTER: Primary | ICD-10-CM

## 2020-12-10 LAB — SARS-COV-2 RNA SPEC QL NAA+PROBE: NOT DETECTED

## 2020-12-10 PROCEDURE — 90375 RABIES IG IM/SC: CPT | Performed by: PHYSICIAN ASSISTANT

## 2020-12-10 PROCEDURE — 99284 EMERGENCY DEPT VISIT MOD MDM: CPT | Performed by: PHYSICIAN ASSISTANT

## 2020-12-10 PROCEDURE — 90675 RABIES VACCINE IM: CPT | Performed by: PHYSICIAN ASSISTANT

## 2020-12-10 PROCEDURE — 99283 EMERGENCY DEPT VISIT LOW MDM: CPT

## 2020-12-10 PROCEDURE — 96372 THER/PROPH/DIAG INJ SC/IM: CPT

## 2020-12-10 PROCEDURE — 73130 X-RAY EXAM OF HAND: CPT

## 2020-12-10 PROCEDURE — 90471 IMMUNIZATION ADMIN: CPT

## 2020-12-10 RX ORDER — AMOXICILLIN AND CLAVULANATE POTASSIUM 875; 125 MG/1; MG/1
1 TABLET, FILM COATED ORAL 2 TIMES DAILY
Qty: 20 TABLET | Refills: 0 | Status: SHIPPED | OUTPATIENT
Start: 2020-12-10 | End: 2020-12-20

## 2020-12-10 RX ADMIN — RABIES IMMUNE GLOBULIN (HUMAN) 1860 UNITS: 300 INJECTION, SOLUTION INFILTRATION; INTRAMUSCULAR at 15:49

## 2020-12-10 RX ADMIN — RABIES VIRUS STRAIN PM-1503-3M ANTIGEN (PROPIOLACTONE INACTIVATED) AND WATER 1 ML: KIT at 15:55

## 2020-12-29 ENCOUNTER — TELEMEDICINE (OUTPATIENT)
Dept: FAMILY MEDICINE CLINIC | Facility: CLINIC | Age: 25
End: 2020-12-29

## 2020-12-29 DIAGNOSIS — G44.209 TENSION HEADACHE: ICD-10-CM

## 2020-12-29 DIAGNOSIS — G43.009 MIGRAINE WITHOUT AURA AND WITHOUT STATUS MIGRAINOSUS, NOT INTRACTABLE: ICD-10-CM

## 2020-12-29 PROCEDURE — 99213 OFFICE O/P EST LOW 20 MIN: CPT | Performed by: FAMILY MEDICINE

## 2020-12-29 RX ORDER — IBUPROFEN 600 MG/1
600 TABLET ORAL EVERY 6 HOURS PRN
Qty: 30 TABLET | Refills: 1 | Status: SHIPPED | OUTPATIENT
Start: 2020-12-29 | End: 2022-01-14 | Stop reason: SDUPTHER

## 2020-12-29 RX ORDER — RIZATRIPTAN BENZOATE 5 MG/1
5 TABLET, ORALLY DISINTEGRATING ORAL ONCE AS NEEDED
Qty: 9 TABLET | Refills: 0 | Status: SHIPPED | OUTPATIENT
Start: 2020-12-29 | End: 2021-03-03 | Stop reason: SDUPTHER

## 2021-01-26 ENCOUNTER — CLINICAL SUPPORT (OUTPATIENT)
Dept: FAMILY MEDICINE CLINIC | Facility: CLINIC | Age: 26
End: 2021-01-26

## 2021-01-26 DIAGNOSIS — Z23 ENCOUNTER FOR IMMUNIZATION: Primary | ICD-10-CM

## 2021-01-26 PROCEDURE — 90746 HEPB VACCINE 3 DOSE ADULT IM: CPT

## 2021-01-26 PROCEDURE — G0010 ADMIN HEPATITIS B VACCINE: HCPCS

## 2021-02-07 ENCOUNTER — HOSPITAL ENCOUNTER (EMERGENCY)
Facility: HOSPITAL | Age: 26
Discharge: HOME | End: 2021-02-07
Attending: EMERGENCY MEDICINE | Admitting: EMERGENCY MEDICINE
Payer: MEDICARE

## 2021-02-07 VITALS
BODY MASS INDEX: 38.27 KG/M2 | OXYGEN SATURATION: 96 % | TEMPERATURE: 98.7 F | SYSTOLIC BLOOD PRESSURE: 135 MMHG | WEIGHT: 209.22 LBS | RESPIRATION RATE: 20 BRPM | DIASTOLIC BLOOD PRESSURE: 93 MMHG | HEART RATE: 102 BPM

## 2021-02-07 DIAGNOSIS — J06.9 VIRAL URI WITH COUGH: Primary | ICD-10-CM

## 2021-02-07 PROCEDURE — 99283 EMERGENCY DEPT VISIT LOW MDM: CPT

## 2021-02-07 PROCEDURE — 87635 SARS-COV-2 COVID-19 AMP PRB: CPT | Performed by: EMERGENCY MEDICINE

## 2021-02-07 PROCEDURE — 99284 EMERGENCY DEPT VISIT MOD MDM: CPT | Performed by: EMERGENCY MEDICINE

## 2021-02-07 RX ORDER — BENZONATATE 100 MG/1
100 CAPSULE ORAL EVERY 8 HOURS
Qty: 20 CAPSULE | Refills: 0 | Status: SHIPPED | OUTPATIENT
Start: 2021-02-07 | End: 2021-02-14

## 2021-02-07 RX ORDER — BENZONATATE 100 MG/1
100 CAPSULE ORAL ONCE
Status: COMPLETED | OUTPATIENT
Start: 2021-02-07 | End: 2021-02-07

## 2021-02-07 RX ADMIN — BENZONATATE 100 MG: 100 CAPSULE ORAL at 14:58
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
## 2021-02-07 NOTE — DISCHARGE INSTRUCTIONS
DIAGNOSIS: VIRAL RESPIRATORY INFECTION - POSSIBLE COVID INFECTION     - ACTIVITY AS TOLERATED     - PLEASE KEEP HYDRATED       - FOR FEVERS- TEMP > 100 4/ - BODY ACHES--  OVER THE COUNTER TYLENOL 500 MG EVERY 4 HRS     - FOR COUGH -CAN TRY TESSALON- 1 CAPSULE 3 TIMES A DAY AS NEEDED- CAN ALSO TRY OVER THE COUNTER GENERIC MUCINEX     -- WE WILL CONTACT YOU WITH THE RESULT- EITHER- WAY -- USUALLY WITHIN 1-2 DAYS    - PLEASE RETURN TO THE ER FOR ANY INCREASING SHORTNESS OF BREAT

## 2021-02-08 LAB — SARS-COV-2 RNA RESP QL NAA+PROBE: NEGATIVE

## 2021-02-08 NOTE — RESULT ENCOUNTER NOTE
Attempted calling Sherry to provide testing results, no answer, left her a message to return call to the ER she had testing completed at

## 2021-02-19 ENCOUNTER — TELEMEDICINE (OUTPATIENT)
Dept: FAMILY MEDICINE CLINIC | Facility: CLINIC | Age: 26
End: 2021-02-19

## 2021-02-19 DIAGNOSIS — Z20.822 EXPOSURE TO COVID-19 VIRUS: Primary | ICD-10-CM

## 2021-02-19 PROCEDURE — G2025 DIS SITE TELE SVCS RHC/FQHC: HCPCS | Performed by: FAMILY MEDICINE

## 2021-02-19 NOTE — PROGRESS NOTES
COVID-19 Virtual Visit     Assessment/Plan:    Problem List Items Addressed This Visit        Other    Exposure to COVID-19 virus - Primary     Date of exposure 2/16/2021  Patient has had cold-like symptoms since 2/7, was tested negatively for Covid  However, her symptoms worsening with headaches, nasal congestion, sore throat and cough  Supportive care with Tylenol, and hydration  Covid test was placed as part of requirement of home health care  Relevant Orders    Novel Coronavirus (Covid-19),PCR [facility] - Collected at Mobile [facility] or [facility]         Disposition:     I referred patient to one of our centralized sites for a COVID-19 swab  I have spent 20 minutes directly with the patient  Encounter provider Sheila Rubio MD    Provider located at [address] [place] [address] [id number] [place] [address]   [phone]    Recent Visits  No visits were found meeting these conditions  Showing recent visits within past 7 days and meeting all other requirements     Today's Visits  Date Type Provider Dept   02/19/21 Telemedicine Sheila Rubio MD [facility] today's visits and meeting all other requirements     Future Appointments  No visits were found meeting these conditions  Showing future appointments within next 150 days and meeting all other requirements        Patient agrees to participate in a virtual check in via telephone or video visit instead of presenting to the office to address urgent/immediate medical needs  Patient is aware this is a billable service  After connecting through Telephone, the patient was identified by name and date of birth  Rosey Zavala was informed that this was a telemedicine visit and that the exam was being conducted confidentially over secure lines  My office door was closed  No one else was in the room   Rosey Zavala acknowledged consent and understanding of privacy and security of the telemedicine visit  I informed the patient that I have reviewed her record in Epic and presented the opportunity for her to ask any questions regarding the visit today  The patient agreed to participate  It was my intent to perform this visit via video technology but the patient was not able to do a video connection so the visit was completed via audio telephone only  Subjective:   Zo Zelaya is a 30 y o  female who is concerned about COVID-19  Patient's symptoms include nasal congestion, rhinorrhea, sore throat, cough and headache  Patient denies fever, chills, shortness of breath, chest tightness, abdominal pain, nausea, vomiting and diarrhea       Date of symptom onset: 2/16/2021  Date of exposure: 2/16/2019    Exposure:   Contact with a person who is under investigation (PUI) for or who is positive for COVID-19 within the last 14 days?: Yes    Hospitalized recently for fever and/or lower respiratory symptoms?: No      Currently a healthcare worker that is involved in direct patient care?: No      Works in a special setting where the risk of COVID-19 transmission may be high? (this may include long-term care, correctional and custodial facilities; homeless shelters; assisted-living facilities and group homes ): No      Resident in a special setting where the risk of COVID-19 transmission may be high? (this may include long-term care, correctional and custodial facilities; homeless shelters; assisted-living facilities and group homes ): No      Lab Results   Component Value Date    SARSCOV2 Negative 02/07/2021    [facility] Not Detected 12/09/2020     Past Medical History:   Diagnosis Date    Altered level of consciousness     Depression     Intellectual disability      Past Surgical History:   Procedure Laterality Date    NO PAST SURGERIES       Current Outpatient Medications   Medication Sig Dispense Refill    FLUoxetine (PROzac) 40 MG capsule Take 1 capsule (40 mg total) by mouth daily 90 capsule 3    ibuprofen (MOTRIN) 600 mg tablet Take 1 tablet (600 mg total) by mouth every 6 (six) hours as needed for mild pain 30 tablet 1    levonorgestrel (MIRENA) 20 MCG/24HR IUD 1 each by Intrauterine route once      methocarbamol (ROBAXIN) 500 mg tablet Take 1 tablet (500 mg total) by mouth 3 (three) times a day as needed for muscle spasms 30 tablet 0    ondansetron (ZOFRAN-ODT) 4 mg disintegrating tablet Take 1 tablet (4 mg total) by mouth every 6 (six) hours as needed for nausea or vomiting (Patient not taking: Reported on 12/11/2020) 20 tablet 0    rizatriptan (MAXALT-MLT) 5 MG disintegrating tablet Take 1 tablet (5 mg total) by mouth once as needed for migraine for up to 1 dose May repeat in 2 hours if needed 9 tablet 0     No current facility-administered medications for this visit  No Known Allergies    Review of Systems   Constitutional: Negative for chills and fever  HENT: Positive for congestion, rhinorrhea and sore throat  Respiratory: Positive for cough  Negative for chest tightness and shortness of breath  Gastrointestinal: Negative for abdominal pain, diarrhea, nausea and vomiting  Neurological: Positive for headaches  Objective: There were no vitals filed for this visit  Physical Exam  VIRTUAL VISIT DISCLAIMER    Dolly Young acknowledges that she has consented to an online visit or consultation  She understands that the online visit is based solely on information provided by her, and that, in the absence of a face-to-face physical evaluation by the physician, the diagnosis she receives is both limited and provisional in terms of accuracy and completeness  This is not intended to replace a full medical face-to-face evaluation by the physician  Dolly Young understands and accepts these terms

## 2021-02-19 NOTE — ASSESSMENT & PLAN NOTE
Date of exposure 2/16/2021  Patient has had cold-like symptoms since 2/7, was tested negatively for Covid  However, her symptoms worsening with headaches, nasal congestion, sore throat and cough  Supportive care with Tylenol, and hydration  Covid test was placed as part of requirement of home health care

## 2021-02-20 DIAGNOSIS — Z20.822 EXPOSURE TO COVID-19 VIRUS: ICD-10-CM

## 2021-02-20 PROCEDURE — U0003 INFECTIOUS AGENT DETECTION BY NUCLEIC ACID (DNA OR RNA); SEVERE ACUTE RESPIRATORY SYNDROME CORONAVIRUS 2 (SARS-COV-2) (CORONAVIRUS DISEASE [COVID-19]), AMPLIFIED PROBE TECHNIQUE, MAKING USE OF HIGH THROUGHPUT TECHNOLOGIES AS DESCRIBED BY CMS-2020-01-R: HCPCS | Performed by: FAMILY MEDICINE

## 2021-02-20 PROCEDURE — U0005 INFEC AGEN DETEC AMPLI PROBE: HCPCS | Performed by: FAMILY MEDICINE

## 2021-02-21 LAB — SARS-COV-2 RNA RESP QL NAA+PROBE: NEGATIVE

## 2021-03-03 DIAGNOSIS — G43.009 MIGRAINE WITHOUT AURA AND WITHOUT STATUS MIGRAINOSUS, NOT INTRACTABLE: ICD-10-CM

## 2021-03-03 RX ORDER — RIZATRIPTAN BENZOATE 5 MG/1
5 TABLET, ORALLY DISINTEGRATING ORAL ONCE AS NEEDED
Qty: 9 TABLET | Refills: 0 | Status: SHIPPED | OUTPATIENT
Start: 2021-03-03 | End: 2021-08-30 | Stop reason: ALTCHOICE

## 2021-03-11 ENCOUNTER — SOCIAL WORK (OUTPATIENT)
Dept: BEHAVIORAL/MENTAL HEALTH CLINIC | Facility: CLINIC | Age: 26
End: 2021-03-11
Payer: MEDICARE

## 2021-03-11 DIAGNOSIS — F33.9 MAJOR DEPRESSION, RECURRENT, CHRONIC (HCC): Primary | ICD-10-CM

## 2021-03-11 PROCEDURE — 90791 PSYCH DIAGNOSTIC EVALUATION: CPT | Performed by: SOCIAL WORKER
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
## 2021-03-17 ENCOUNTER — TELEPHONE (OUTPATIENT)
Dept: PSYCHIATRY | Facility: CLINIC | Age: 26
End: 2021-03-17
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
## 2021-03-18 ENCOUNTER — TELEPHONE (OUTPATIENT)
Dept: BEHAVIORAL/MENTAL HEALTH CLINIC | Facility: CLINIC | Age: 26
End: 2021-03-18

## 2021-03-25 ENCOUNTER — SOCIAL WORK (OUTPATIENT)
Dept: BEHAVIORAL/MENTAL HEALTH CLINIC | Facility: CLINIC | Age: 26
End: 2021-03-25
Payer: MEDICARE

## 2021-03-25 DIAGNOSIS — F33.9 MAJOR DEPRESSION, RECURRENT, CHRONIC (HCC): Primary | ICD-10-CM

## 2021-03-25 PROCEDURE — 90834 PSYTX W PT 45 MINUTES: CPT | Performed by: SOCIAL WORKER
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
## 2021-03-25 NOTE — BH TREATMENT PLAN
Lois Madison  1995       Date of Initial Treatment Plan: 03/25/21  Date of Current Treatment Plan: 03/25/21    Treatment Plan Number 1     Strengths/Personal Resources for Self Care: caring, hard worker    Diagnosis:   1  Major depression, recurrent, chronic (HCC)         Area of Needs:  My boyfriend doesn't always treat me right         Long Term Goal 1: I want my relationship to be better  Target Date: 9/21  Completion Date: N/A         Short Term Objectives for Goal 1: I will ask him to help me out financialy with the bills  I will ask him to not yell and get frustrated with me when he doesn't get what he wants  I will walk away when he doesn't talk to me right  I will ask him to be truthful and to not lie to me  I will to list what do you want out of a relationship/what type of person do what  I will ask myself does my current boyfriend match up to the qualities I want from someone I am in a relationship with  I will not give in to temper tantrums or quilt trips  Long Term Goal 2: N/A    Target Date: N/A  Completion Date: N/A    Short Term Objectives for Goal 2: N/A         Long Term Goal # 3: N/A     Target Date: N/A  Completion Date: N/A    Short Term Objectives for Goal 3: N/A    GOAL 1: Modality: Individual 2x per month   Completion Date NA and The person(s) responsible for carrying out the plan is  MJ  GOAL 2: Modality:NA     GOAL 3: Modality:NA       Behavioral Health Treatment Plan [facility]: Diagnosis and Treatment Plan explained to Allegra Jessica relates understanding diagnosis and is agreeable to Treatment Plan         Client Comments : Please share your thoughts, feelings, need and/or experiences regarding your treatment plan:

## 2021-04-09 ENCOUNTER — TELEMEDICINE (OUTPATIENT)
Dept: FAMILY MEDICINE CLINIC | Facility: CLINIC | Age: 26
End: 2021-04-09

## 2021-04-09 DIAGNOSIS — Z20.822 EXPOSURE TO COVID-19 VIRUS: Primary | ICD-10-CM

## 2021-04-09 PROCEDURE — G2025 DIS SITE TELE SVCS RHC/FQHC: HCPCS | Performed by: FAMILY MEDICINE

## 2021-04-09 NOTE — PROGRESS NOTES
COVID-19 Outpatient Progress Note    Assessment/Plan:    Problem List Items Addressed This Visit        Other    Exposure to COVID-19 virus - Primary     ·   Patient had COVID exposure on 04/08/2021, that is when her  tested positive  ·  her  has symptoms starting on 04/06/2021   · The patient is currently asymptomatic besides rhinorrhea  · Reviewed ED precautions  · Recommended proper hygiene and wearing masks with only essential travel at this time   · Recommended quarantined for 7 days  · Recommended the patient gets COVID tested on Monday to be outside the 5 day window of incubation  · I will follow-up with the patient when she is tested whether it is negative her positive  Relevant Orders    Novel Coronavirus (Covid-19),PCR [facility] - Collected at Mobile Vans or [facility]         Disposition:     I recommended self-quarantine for 10 days and to watch for symptoms until 14 days after exposure  If patient were to develop symptoms, they should self isolate and call our office for further guidance  I recommended COVID-19 PCR testing on or after day 5 since last exposure and if negative can end quarantine after 7 days  Patient was instructed to watch for symptoms until 14 days after exposure  If patient were to develop symptoms, they should immediately self isolate and call our office for further guidance  I have spent 20 minutes directly with the patient  Encounter provider Kishan Richter DO    Provider located at [address] [place] [address]   [phone]    Recent Visits  No visits were found meeting these conditions     Showing recent visits within past 7 days and meeting all other requirements     Today's Visits  Date Type Provider Dept   04/09/21 Telemedicine Kishan Richter, [address] [place] [address] today's visits and meeting all other requirements     Future Appointments  No visits were found meeting these conditions  Showing future appointments within next 150 days and meeting all other requirements        Patient agrees to participate in a virtual check in via telephone or video visit instead of presenting to the office to address urgent/immediate medical needs  Patient is aware this is a billable service  After connecting through Telephone, the patient was identified by name and date of birth  Mary Singh was informed that this was a telemedicine visit and that the exam was being conducted confidentially over secure lines  My office door was closed  No one else was in the room  Mary Singh acknowledged consent and understanding of privacy and security of the telemedicine visit  I informed the patient that I have reviewed her record in Epic and presented the opportunity for her to ask any questions regarding the visit today  The patient agreed to participate  Subjective:   Mary Singh is a 30 y o  female who is concerned about COVID-19  Patient's symptoms include rhinorrhea  Patient denies fever, chills, fatigue, malaise, congestion, sore throat, anosmia, loss of taste, cough, shortness of breath, chest tightness, abdominal pain, nausea, vomiting, diarrhea, myalgias and headaches       Date of exposure: 4/8/2021    Exposure:   Contact with a person who is under investigation (PUI) for or who is positive for COVID-19 within the last 14 days?: Yes    Hospitalized recently for fever and/or lower respiratory symptoms?: No      Currently a healthcare worker that is involved in direct patient care?: No      Works in a special setting where the risk of COVID-19 transmission may be high? (this may include long-term care, correctional and correction facilities; homeless shelters; assisted-living facilities and group homes ): No      Resident in a special setting where the risk of COVID-19 transmission may be high? (this may include long-term care, correctional and correction facilities; homeless shelters; assisted-living facilities and group homes ): No      Lab Results   Component Value Date    SARSCOV2 Negative 02/20/2021    SARSCOV2 Not Detected 12/09/2020     Past Medical History:   Diagnosis Date    Altered level of consciousness     Depression     Intellectual disability      Past Surgical History:   Procedure Laterality Date    NO PAST SURGERIES       Current Outpatient Medications   Medication Sig Dispense Refill    FLUoxetine (PROzac) 40 MG capsule Take 1 capsule (40 mg total) by mouth daily 90 capsule 3    ibuprofen (MOTRIN) 600 mg tablet Take 1 tablet (600 mg total) by mouth every 6 (six) hours as needed for mild pain 30 tablet 1    levonorgestrel (MIRENA) 20 MCG/24HR IUD 1 each by Intrauterine route once      methocarbamol (ROBAXIN) 500 mg tablet Take 1 tablet (500 mg total) by mouth 3 (three) times a day as needed for muscle spasms 30 tablet 0    ondansetron (ZOFRAN-ODT) 4 mg disintegrating tablet Take 1 tablet (4 mg total) by mouth every 6 (six) hours as needed for nausea or vomiting (Patient not taking: Reported on 12/11/2020) 20 tablet 0    rizatriptan (MAXALT-MLT) 5 MG disintegrating tablet Take 1 tablet (5 mg total) by mouth once as needed for migraine for up to 1 dose May repeat in 2 hours if needed 9 tablet 0     No current facility-administered medications for this visit  No Known Allergies    Review of Systems   Constitutional: Negative for chills, fatigue and fever  HENT: Positive for rhinorrhea  Negative for congestion, ear pain and sore throat  Eyes: Negative for pain and visual disturbance  Respiratory: Negative for cough, chest tightness and shortness of breath  Cardiovascular: Negative for chest pain and palpitations  Gastrointestinal: Negative for abdominal pain, diarrhea, nausea and vomiting  Genitourinary: Negative for dysuria and hematuria  Musculoskeletal: Negative for arthralgias, back pain and myalgias     Skin: Negative for color change and rash  Neurological: Negative for seizures, syncope and headaches  Objective: There were no vitals filed for this visit  It was my intent to perform this visit via video technology but the patient was not able to do a video connection so the visit was completed via audio telephone only  Physical exam could not be performed due to telephone encounter  VIRTUAL VISIT DISCLAIMER    Frances Macario acknowledges that she has consented to an online visit or consultation  She understands that the online visit is based solely on information provided by her, and that, in the absence of a face-to-face physical evaluation by the physician, the diagnosis she receives is both limited and provisional in terms of accuracy and completeness  This is not intended to replace a full medical face-to-face evaluation by the physician  Frances Macario understands and accepts these terms      Tanna Camacho DO  Family Medicine Resident, PGY1  2:03 PM

## 2021-04-09 NOTE — ASSESSMENT & PLAN NOTE
·   Patient had COVID exposure on 04/08/2021, that is when her  tested positive  ·  her  has symptoms starting on 04/06/2021   · The patient is currently asymptomatic besides rhinorrhea  · Reviewed ED precautions  · Recommended proper hygiene and wearing masks with only essential travel at this time   · Recommended quarantined for 7 days  · Recommended the patient gets COVID tested on Monday to be outside the 5 day window of incubation  · I will follow-up with the patient when she is tested whether it is negative her positive

## 2021-04-12 DIAGNOSIS — Z20.822 EXPOSURE TO COVID-19 VIRUS: ICD-10-CM

## 2021-04-12 PROCEDURE — U0005 INFEC AGEN DETEC AMPLI PROBE: HCPCS | Performed by: FAMILY MEDICINE

## 2021-04-12 PROCEDURE — U0003 INFECTIOUS AGENT DETECTION BY NUCLEIC ACID (DNA OR RNA); SEVERE ACUTE RESPIRATORY SYNDROME CORONAVIRUS 2 (SARS-COV-2) (CORONAVIRUS DISEASE [COVID-19]), AMPLIFIED PROBE TECHNIQUE, MAKING USE OF HIGH THROUGHPUT TECHNOLOGIES AS DESCRIBED BY CMS-2020-01-R: HCPCS | Performed by: FAMILY MEDICINE

## 2021-04-14 ENCOUNTER — TELEPHONE (OUTPATIENT)
Dept: FAMILY MEDICINE CLINIC | Facility: CLINIC | Age: 26
End: 2021-04-14

## 2021-04-14 ENCOUNTER — TELEPHONE (OUTPATIENT)
Dept: CCU | Facility: HOSPITAL | Age: 26
End: 2021-04-14

## 2021-04-14 LAB — SARS-COV-2 RNA RESP QL NAA+PROBE: POSITIVE

## 2021-04-14 NOTE — TELEPHONE ENCOUNTER
Spoke with Sabra Urrutia caregiver, she also has covid and states there wouldn't be anyone to help Retie with the phone call but that the pt doesn't really have any symptoms so doesn't feel like she needs call  States they will reach out if they need anything for pt

## 2021-04-14 NOTE — TELEPHONE ENCOUNTER
----- Message from Martina Avalos DO sent at 4/14/2021  7:34 AM EDT -----  Please schedule this patient for telemedicine encounter today  She tested positive for COVID today  Her  follows her, Gilda Turner that can be reached at [phone]  I will call the  today regarding the positive COVID test     Abbie Tripathi

## 2021-04-14 NOTE — TELEPHONE ENCOUNTER
Called patient's  Isa Kidd regarding positive test  [employer] is aware, they will schedule telemedicine visit for today or tomorrow  I also sent message to clerical staff to schedule appointment as follow-up  Recommended Vitamins, hydration and Tylenol PRN  Also reviewed ED precautions at this time

## 2021-04-20 ENCOUNTER — TELEPHONE (OUTPATIENT)
Dept: PSYCHIATRY | Facility: CLINIC | Age: 26
End: 2021-04-20

## 2021-04-20 NOTE — TELEPHONE ENCOUNTER
----- Message from Anais Cho sent at 4/20/2021  9:06 AM EDT -----  Regarding: Cx 4/22  Patient contacted office and spoke with writer to cancel appointment for therapy on 4/22/2021  Next F/U is 4/30  Reason: Positive for COVID

## 2021-04-21 ENCOUNTER — TELEPHONE (OUTPATIENT)
Dept: PSYCHIATRY | Facility: CLINIC | Age: 26
End: 2021-04-21

## 2021-04-21 NOTE — TELEPHONE ENCOUNTER
----- Message from Ayush Aceves sent at 4/20/2021  9:06 AM EDT -----  Regarding: Cx 4/22  Patient contacted office and spoke with writer to cancel appointment for therapy on 4/22/2021  Next F/U is 4/30  Reason: Positive for COVID

## 2021-04-28 ENCOUNTER — TELEPHONE (OUTPATIENT)
Dept: PSYCHIATRY | Facility: CLINIC | Age: 26
End: 2021-04-28

## 2021-04-28 NOTE — TELEPHONE ENCOUNTER
Spoke with caregiver to confirm in office appt for 4/30  Caregiver will call office back if Long Nickerson is able to come to the visit due to the caregiver needing to get clearance to bring Patient since she was exposed to [employer], Both patient and caregiver have a history of Covid  Just waiting clearance in order to confirm appt

## 2021-04-30 ENCOUNTER — SOCIAL WORK (OUTPATIENT)
Dept: BEHAVIORAL/MENTAL HEALTH CLINIC | Facility: CLINIC | Age: 26
End: 2021-04-30
Payer: MEDICARE

## 2021-04-30 DIAGNOSIS — F33.9 MAJOR DEPRESSION, RECURRENT, CHRONIC (HCC): Primary | ICD-10-CM

## 2021-04-30 PROCEDURE — 90834 PSYTX W PT 45 MINUTES: CPT | Performed by: SOCIAL WORKER

## 2021-04-30 NOTE — PSYCH
Psychotherapy Provided: Individual Psychotherapy 50 minutes     Length of time in session: 50 minutes, follow up in 2 week    Encounter Diagnosis     ICD-10-CM    1  Major depression, recurrent, chronic (HCC)  F33 9        Goals addressed in session: Goal 1     Pain:      none    0    Current suicide risk : Low     D:  Met with Sherry for session  Her twin sister has been staying with her for the past two months  She "thinks she is using drugs" due to she goes out at 1:00 am, sleeps all day, does nothing around the house and by the way she looks "  Her sister has a hx of crystal meth and left a rehab a few months ago  Her sister is not paying her any money due to the rehab threw away all her belongs due to not returning in 30 days after she left to pick them up  Discussed setting boundaries and deadlines with her sister and sticking to them  Her sister will therefore be the one to either do what she needs to do or choose not to and therefore, will be choosing the consequences of having to leave  MJ also recently had COVID, minimal symptoms  She was the only one in the house who tested positive  She had a difficult time quarentining due to the circumstances in her house  Discussed her boyfriend  He has been spending time at his mom's do to his mother has cancer  He is there for a week now  Continues not to have a job  A:  Min progress on goal     P:  To continue addressing tx plan goals  Behavioral Health Treatment Plan [facility]: Diagnosis and Treatment Plan explained to Saida Desai relates understanding diagnosis and is agreeable to Treatment Plan   Yes

## 2021-05-06 ENCOUNTER — SOCIAL WORK (OUTPATIENT)
Dept: BEHAVIORAL/MENTAL HEALTH CLINIC | Facility: CLINIC | Age: 26
End: 2021-05-06
Payer: MEDICARE

## 2021-05-06 DIAGNOSIS — F33.9 MAJOR DEPRESSION, RECURRENT, CHRONIC (HCC): Primary | ICD-10-CM

## 2021-05-06 PROCEDURE — 90834 PSYTX W PT 45 MINUTES: CPT | Performed by: SOCIAL WORKER

## 2021-05-06 NOTE — PSYCH
Psychotherapy Provided: Individual Psychotherapy 50 minutes     Length of time in session: 50 minutes, follow up in 2 week    Encounter Diagnosis     ICD-10-CM    1  Major depression, recurrent, chronic (HCC)  F33 9        Goals addressed in session: Goal 1     Pain:      none    0    Current suicide risk : Low     D:  Met with Sherry for session  She told her sister that she has to the end of this month to move out  Her sister said, ok but, has not done anything yet to find another place to stay  [facility] Tina also wants to move either next month of in July due to she has issues with her neighbors, her boyfriend says he doesn't pay 1/2 the bills because his name is not on the lease and because him also being down at a friends a block away  Discussed each of those reasons  Also, discussed her hx of an STD and his denying he cheated  A:  Mild progress on goal due to looking for another place to live  P:  To continue addressing tx plan goals        Behavioral Health Treatment Plan [facility]: Diagnosis and Treatment Plan explained to Luciana Chavez relates understanding diagnosis and is agreeable to Treatment Plan   Yes

## 2021-06-08 ENCOUNTER — TELEPHONE (OUTPATIENT)
Dept: BEHAVIORAL/MENTAL HEALTH CLINIC | Facility: CLINIC | Age: 26
End: 2021-06-08

## 2021-06-08 ENCOUNTER — SOCIAL WORK (OUTPATIENT)
Dept: BEHAVIORAL/MENTAL HEALTH CLINIC | Facility: CLINIC | Age: 26
End: 2021-06-08
Payer: MEDICARE

## 2021-06-08 DIAGNOSIS — F33.9 MAJOR DEPRESSION, RECURRENT, CHRONIC (HCC): Primary | ICD-10-CM

## 2021-06-08 PROCEDURE — 90834 PSYTX W PT 45 MINUTES: CPT | Performed by: SOCIAL WORKER

## 2021-06-08 NOTE — PSYCH
Psychotherapy Provided: Individual Psychotherapy 50 minutes     Length of time in session: 50 minutes, follow up in 2 week    Encounter Diagnosis     ICD-10-CM    1  Major depression, recurrent, chronic (HCC)  F33 9        Goals addressed in session: Goal 1     Pain:      none    0    Current suicide risk : Low     D:  Met with Sherry for session  Her sister is still living with her but, Alex Hernandez "" is helping her sister to get on her feet  Discussed STANISLAW's boyfriend  He has been speaking to his child's mother a great deal and having "innapropirate" conversations  This is bothering her  She continued to want to move in Aug   One reason is to get his name on the lease  Discussed the pros and cons of this  A:  Mild progress on goal   Appears to be gaining insight into her relationship  P:  To continue with tx plan goals        Behavioral Health Treatment Plan [facility]: Diagnosis and Treatment Plan explained to Gonzalez Jenkins relates understanding diagnosis and is agreeable to Treatment Plan   Yes

## 2021-06-11 ENCOUNTER — TELEPHONE (OUTPATIENT)
Dept: PSYCHIATRY | Facility: CLINIC | Age: 26
End: 2021-06-11

## 2021-06-15 ENCOUNTER — SOCIAL WORK (OUTPATIENT)
Dept: BEHAVIORAL/MENTAL HEALTH CLINIC | Facility: CLINIC | Age: 26
End: 2021-06-15
Payer: MEDICARE

## 2021-06-15 DIAGNOSIS — F33.9 MAJOR DEPRESSION, RECURRENT, CHRONIC (HCC): Primary | ICD-10-CM

## 2021-06-15 PROCEDURE — 90834 PSYTX W PT 45 MINUTES: CPT | Performed by: SOCIAL WORKER
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
## 2021-06-16 ENCOUNTER — TELEPHONE (OUTPATIENT)
Dept: PSYCHIATRY | Facility: CLINIC | Age: 26
End: 2021-06-16

## 2021-06-16 NOTE — TELEPHONE ENCOUNTER
Contacted  on 6/16/21 to inform her that you spoke with patient about the current situation and patient filled you in on what was happening  Patient is interested in switching to a new therapist due to wanting to be able to come in to the office for future visits

## 2021-06-17 NOTE — TELEPHONE ENCOUNTER
The  had mentioned that to me during the phone call about the patient looking to switch because she wants to come in to the office for the visits  This was during the call that was on 6/16/21

## 2021-07-13 ENCOUNTER — TELEPHONE (OUTPATIENT)
Dept: PSYCHIATRY | Facility: CLINIC | Age: 26
End: 2021-07-13

## 2021-07-13 NOTE — TELEPHONE ENCOUNTER
Returning VM regarding setting up services for [facility] [place] [facility] saying that they want to be seen in person and understand Charlotte Ortiz is not going to be doing in person and would like to switch to Cathleen instead  I told her to call for further information

## 2021-07-13 NOTE — TELEPHONE ENCOUNTER
Darci Powell's care giver would like to switch to another therapist if possible  She said Yulia Bull prefers to be seen in person because Maria Luisa Tate is only doing virtual visits  Can you please reach out to Yulia Bull thank you

## 2021-07-27 NOTE — TELEPHONE ENCOUNTER
Deborah Davis called to say that if she can get Rachel Cote in sooner w/ any therapist they will take anyone rather than wait for Yasir Gabriel

## 2021-08-03 ENCOUNTER — OFFICE VISIT (OUTPATIENT)
Dept: BEHAVIORAL/MENTAL HEALTH CLINIC | Facility: CLINIC | Age: 26
End: 2021-08-03
Payer: COMMERCIAL

## 2021-08-03 DIAGNOSIS — F33.9 MAJOR DEPRESSION, RECURRENT, CHRONIC (HCC): Primary | ICD-10-CM

## 2021-08-03 DIAGNOSIS — Q86.0 FETAL ALCOHOL SYNDROME: ICD-10-CM

## 2021-08-03 DIAGNOSIS — F70 MILD INTELLECTUAL DISABILITY: ICD-10-CM

## 2021-08-03 PROCEDURE — 90834 PSYTX W PT 45 MINUTES: CPT | Performed by: PSYCHOLOGIST
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
## 2021-08-11 ENCOUNTER — TELEPHONE (OUTPATIENT)
Dept: PSYCHIATRY | Facility: CLINIC | Age: 26
End: 2021-08-11

## 2021-08-11 NOTE — TELEPHONE ENCOUNTER
Called to confirm the Pt's appt for Tuesday 8-17 and Pt's caregiver Rocky Russell Confirmed  Rocky Russell then asked if you could give her a call before the Pt's appt  She wanted to brief you on some things going on  Rocky Russell can be reached [phone]  Anytime is good except 2pm-3pm today, 8-11-21, due to Rocky Russell having an appt
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
## 2021-08-17 ENCOUNTER — OFFICE VISIT (OUTPATIENT)
Dept: BEHAVIORAL/MENTAL HEALTH CLINIC | Facility: CLINIC | Age: 26
End: 2021-08-17
Payer: COMMERCIAL

## 2021-08-17 DIAGNOSIS — F33.9 MAJOR DEPRESSION, RECURRENT, CHRONIC (HCC): Primary | ICD-10-CM

## 2021-08-17 DIAGNOSIS — Q86.0 FETAL ALCOHOL SYNDROME: ICD-10-CM

## 2021-08-17 DIAGNOSIS — F70 MILD INTELLECTUAL DISABILITY: ICD-10-CM

## 2021-08-17 PROCEDURE — 90834 PSYTX W PT 45 MINUTES: CPT | Performed by: PSYCHOLOGIST

## 2021-08-17 NOTE — BH TREATMENT PLAN
Mary Machuca  1995       Date of Initial Treatment Plan:  08/17/21  Date of Current Treatment Plan: 08/17/21    Treatment Plan Number 1     Strengths/Personal Resources for Self Care: "Im a caring, loving person "  "I work really hard "    Diagnosis:   1  Major depression, recurrent, chronic ([address] )     2  Mild intellectual disability     3  Fetal alcohol syndrome         Area of Needs: "sometimes I have a hard time trusting people "  "I need to work on getting my license "       Long Term Goal 1: Increasing independence "getting my license and buying a house"     Target Date: 12/1/21-2/22  Completion Date: TBD         Short Term Objectives for Goal 1: "practicing and taking my test "       Short Term Objectives for Goal 1: ask for help        Short Term Objectives for Goal 1: "create a budget and find a good home "    Long Term Goal 2: find ways of not being so depressed     Target Date: 12/1/21-2/22  Completion Date: TBD    Short Term Objectives for Goal 2: "talk to a therapist and take medication "  Short Term Objectives for Goal 2: implement a behavioral activation plan aimed at increasing motivation        Short Term Objectives for Goal 2: practicing coping skills          Long Term Goal # 3: N/A     Target Date: N/A  Completion Date: N/A    Short Term Objectives for Goal 3: N/A    GOAL 1: Modality: Individual 2x per month   Completion Date TBD, Medication Management and The person(s) responsible for carrying out the plan is  mazin Powell     GOAL 2: Modality: Individual 2x per month   Completion Date TBD, Medication Management and The person(s) responsible for carrying out the plan is  mazin Powell     GOAL 3: Modality: [address]: Diagnosis and Treatment Plan explained to Neel Marrero relates understanding diagnosis and is agreeable to Treatment Plan         Client Comments : Please share your thoughts, feelings, need and/or experiences regarding your treatment plan: N/A
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
## 2021-08-25 ENCOUNTER — OFFICE VISIT (OUTPATIENT)
Dept: BEHAVIORAL/MENTAL HEALTH CLINIC | Facility: CLINIC | Age: 26
End: 2021-08-25
Payer: COMMERCIAL

## 2021-08-25 DIAGNOSIS — F70 MILD INTELLECTUAL DISABILITY: ICD-10-CM

## 2021-08-25 DIAGNOSIS — F33.9 MAJOR DEPRESSION, RECURRENT, CHRONIC (HCC): Primary | ICD-10-CM

## 2021-08-25 PROCEDURE — 90834 PSYTX W PT 45 MINUTES: CPT | Performed by: PSYCHOLOGIST

## 2021-08-25 NOTE — PSYCH
Psychotherapy Provided: Individual Psychotherapy 50 minutes     Length of time in session: 50 minutes, follow up in 1 week  Encounter Diagnosis     ICD-10-CM    1  Major depression, recurrent, chronic (HCC)  F33 9    2  Mild intellectual disability  F70        Goals addressed in session: Goal 1     Pain:      none    0    Current suicide risk : Low     Data:  Psychologist met with Titi Kenney  Session was conducted In-person and lasted for 50 minutes  Titi Kenney  reported an intensifying symptoms of depression  she rated her depression as 10 on a scale of 1 (best) to 10 (worst)  Titi Kenney processed Increased feelings of sadness and depression which she attributed to not being able to talk to her boyfriend due to legal charges pending against him  Titi Kenney noted increasing depressive symptomology which include thoughts of "being punished" and thoughts of cutting  She stated "thinking of hurting myself but I won't do it " Assessed risk and she noted that her urge to cut herself is a "5"  In collaboration with this provider, Titi Kenney was able to complete a safety plan detailing triggers, internal coping skills, people and social setting as well as support systems  Provided numbers for professional agencies such as this provider, [facility] Crisis Number  Discussed if symptoms intensfy she should call 911 or go to her nearest ER  Titi Kenney also agreed to [employer] and razors  Psychologist asked if we can share the safety plan with the  who provided transportation ([address]) Or with Rahda Rodriguez who is Rep Payee as well as   Titi Kenney declined and in fact she rescinded the NEIL for Radha Rodriguez  Titi Kenney completed a communication consent from designating that all the information should only be provided to herself  Changes are already made in Epic   Safety plan also uploaded to Epic  Specific techniques used in this session were: psychoeducation, problem solving approach, empathetic listening, validation and coping skills  Assessment:  Grooming and Hygiene were fair  and clothing was casually dressed and appropriate for weather  Ability to preform ADLs has declined  she demonstrated sad and apathetic mood and the affect was constricted  Agus Mckeon made minimal progress toward her treatment goals  she was passive and apathetic  Agus Mckeon denied suicidal thoughts, homicidal thoughts and self injurious behaviors currnetly  Plan: Agus Mckeon  will return to outpatient therapy on a weekly basis  During this time, Agus Mckeon will implement the safety plan if needed  Behavioral Health Treatment Plan [facility]: Diagnosis and Treatment Plan explained to Kirill Steele relates understanding diagnosis and is agreeable to Treatment Plan   No

## 2021-08-30 ENCOUNTER — OFFICE VISIT (OUTPATIENT)
Dept: FAMILY MEDICINE CLINIC | Facility: CLINIC | Age: 26
End: 2021-08-30

## 2021-08-30 VITALS
DIASTOLIC BLOOD PRESSURE: 70 MMHG | TEMPERATURE: 96 F | SYSTOLIC BLOOD PRESSURE: 128 MMHG | BODY MASS INDEX: 39.51 KG/M2 | WEIGHT: 216 LBS | HEART RATE: 70 BPM

## 2021-08-30 DIAGNOSIS — E66.01 CLASS 3 SEVERE OBESITY DUE TO EXCESS CALORIES IN ADULT, UNSPECIFIED BMI, UNSPECIFIED WHETHER SERIOUS COMORBIDITY PRESENT (HCC): ICD-10-CM

## 2021-08-30 DIAGNOSIS — G43.001 MIGRAINE WITHOUT AURA AND WITH STATUS MIGRAINOSUS, NOT INTRACTABLE: Primary | ICD-10-CM

## 2021-08-30 PROCEDURE — 99214 OFFICE O/P EST MOD 30 MIN: CPT | Performed by: FAMILY MEDICINE

## 2021-08-30 RX ORDER — SUMATRIPTAN 25 MG/1
25 TABLET, FILM COATED ORAL ONCE AS NEEDED
Qty: 60 TABLET | Refills: 1 | Status: SHIPPED | OUTPATIENT
Start: 2021-08-30 | End: 2021-09-16

## 2021-08-30 NOTE — PATIENT INSTRUCTIONS
Migraine Headache   WHAT YOU NEED TO KNOW:   A migraine is a severe headache  The pain can be so severe that it interferes with your daily activities  A migraine can last a few hours up to several days  The exact cause of migraines is not known  DISCHARGE INSTRUCTIONS:   Call your local emergency number (911 in the [address]) or have someone call if:   · You feel like you are going to faint, you become confused, or you have a seizure  Return to the emergency department if:   · You have a headache that seems different or much worse than your usual migraine headache  · You have a severe headache with a fever or a stiff neck  · You have new problems with speech, vision, balance, or movement  Call your doctor or neurologist if:   · Your migraines interfere with your daily activities  · Your medicines or treatments stop working  · You have questions or concerns about your condition or care  Medicines:  Some medicines may only be given while you are in the emergency department  You may also need medicines later to manage migraines or other health problems they can cause  Take medicine as soon as you feel a migraine begin, or as directed  · Migraine medicines  are used to help prevent or stop a migraine  · NSAIDs  help decrease swelling and pain or fever  This medicine is available with or without a doctor's order  NSAIDs can cause stomach bleeding or kidney problems in certain people  If you take blood thinner medicine, always ask your healthcare provider if NSAIDs are safe for you  Always read the medicine label and follow directions  · Acetaminophen  decreases pain and fever  It is available without a doctor's order  Ask how much to take and how often to take it  Follow directions  Read the labels of all other medicines you are using to see if they also contain acetaminophen, or ask your doctor or pharmacist  Acetaminophen can cause liver damage if not taken correctly   Do not use more than 4 grams (4,000 milligrams) total of acetaminophen in one day  · Prescription pain medicine  may be given  Ask your healthcare provider how to take this medicine safely  Some prescription pain medicines contain acetaminophen  Do not take other medicines that contain acetaminophen without talking to your healthcare provider  Too much acetaminophen may cause liver damage  Prescription pain medicine may cause constipation  Ask your healthcare provider how to prevent or treat constipation  · Antinausea medicine  may be given to calm your stomach and to help prevent vomiting  This medicine can also help relieve pain  · Steroids  may be given to prevent a migraine from coming back right away  · Take your medicine as directed  Contact your healthcare provider if you think your medicine is not helping or if you have side effects  Tell him or her if you are allergic to any medicine  Keep a list of the medicines, vitamins, and herbs you take  Include the amounts, and when and why you take them  Bring the list or the pill bottles to follow-up visits  Carry your medicine list with you in case of an emergency  Manage your symptoms:   · Rest in a dark, quiet room  This will help decrease your pain  Sleep may also help relieve the pain  · Apply ice to decrease pain  Use an ice pack, or put crushed ice in a plastic bag  Cover the ice pack with a towel and place it on your head  Apply ice for 15 to 20 minutes every hour  · Apply heat to decrease pain and muscle spasms  Use a small towel dampened with warm water or a heating pad, or sit in a warm bath  Apply heat on the area for 20 to 30 minutes every 2 hours  You may alternate heat and ice  · Keep a migraine record  Write down when your migraines start and stop  Include your symptoms and what you were doing when a migraine began  Record what you ate or drank for 24 hours before the migraine started   Keep track of what you did to treat your migraine and if it worked  Bring the migraine record with you to visits with your healthcare provider  Common triggers for a migraine include the following:   · Stress, eye strain, oversleeping, or not getting enough sleep    · Hormone changes in women from birth control pills, pregnancy, menopause, or during a monthly period    · Skipping meals, going too long without eating, or not drinking enough liquids    · Certain foods or drinks such as chocolate, hard cheese, alcohol, or drinks that contain caffeine    · Foods that contain gluten, nitrates, MSG, or artificial sweeteners    · Sunlight, bright or flashing lights, loud noises, smoke, or strong smells    · Heat, humidity, or changes in the weather    Prevent another migraine:   · Prevent a medicine overuse headache  Take pain medicines only as long as directed  A medicine may be limited to a certain amount each month  Your healthcare provider can help you create a plan so you get a safe amount each month  · Do not smoke  Nicotine and other chemicals in cigarettes and cigars can trigger a migraine or make it worse  Ask your healthcare provider for information if you currently smoke and need help to quit  E-cigarettes or smokeless tobacco still contain nicotine  Talk to your healthcare provider before you use these products  · Do not drink alcohol  Alcohol can trigger a migraine  It can also keep medicines used to treat your migraines from working  · Be physically active  Physical activity, such as exercise, may help prevent migraines  Talk to your healthcare provider about the best activity plan for you  Try to get at least 30 minutes of physical activity on most days  · Manage stress  Stress may trigger a migraine  Learn new ways to relax, such as deep breathing  · Create a sleep schedule  Go to bed and get up at the same times each day  Do not watch television before bed  · Eat a variety of healthy foods    Include healthy foods such as fruit, vegetables, whole-grain breads, low-fat dairy products, beans, lean meat, and fish  Do not have food or drinks that trigger your migraines  · Drink more liquids to prevent dehydration  Your healthcare provider can tell you how much liquid to drink each day and which liquids are best for you  Follow up with your doctor or neurologist as directed:  Bring your migraine record with you  Write down your questions so you remember to ask them during your visits  © Copyright [employer] 2021 Information is for End User's use only and may not be sold, redistributed or otherwise used for commercial purposes  All illustrations and images included in CareNotes® are the copyrighted property of A D A M [employer]  or [address] [place], [place] [address]con[place]   The above information is an  only  It is not intended as medical advice for individual conditions or treatments  Talk to your doctor, nurse or pharmacist before following any medical regimen to see if it is safe and effective for you

## 2021-08-30 NOTE — ASSESSMENT & PLAN NOTE
BMI Counseling: Body mass index is 39 51 kg/m²  The BMI is above normal  Nutrition recommendations include reducing portion sizes, decreasing overall calorie intake, 3-5 servings of fruits/vegetables daily, reducing fast food intake and consuming healthier snacks  Exercise recommendations include exercising 3-5 times per week

## 2021-08-30 NOTE — PROGRESS NOTES
Assessment/Plan:       Problem List Items Addressed This Visit        Cardiovascular and Mediastinum    Migraine without aura, not intractable - Primary     Will switch Maxalt to Imitrex  Stress relieving measures discussed with pt  F/u in 2 weeks         Relevant Medications    SUMAtriptan (IMITREX) 25 mg tablet       Other    Obesity     BMI Counseling: Body mass index is 39 51 kg/m²  The BMI is above normal  Nutrition recommendations include reducing portion sizes, decreasing overall calorie intake, 3-5 servings of fruits/vegetables daily, reducing fast food intake and consuming healthier snacks  Exercise recommendations include exercising 3-5 times per week  Subjective:      Patient ID: Nancy Robb is a 27 y o  female  HPI  Pt reports " I will to change my headache medication, I feel like it is not working"  Reports she has been taking medication as prescribed  Headajhes are weekly or every other week  Reports unilateral, left sided and sometimes like a band and tension on back of neck  Headaches are associated with photophobia, phonophobia, nausea and sometimes no vomiting  Denies aura  Periods are sometimes associated with HAs but not as strong  Stressors include issues with her current relationship  She misses her BF but they cant not contact each other and there are legal issues involved  She is walking for 15-20 mins, 3-4x/week  Patient is currently not working      PHQ-9 Depression Screening    PHQ-9:   Frequency of the following problems over the past two weeks:      Little interest or pleasure in doing things: 0 - not at all  Feeling down, depressed, or hopeless: 0 - not at all  Trouble falling or staying asleep, or sleeping too much: 1 - several days  Feeling tired or having little energy: 1 - several days  Poor appetite or overeatin - not at all  Feeling bad about yourself - or that you are a failure or have let yourself or your family down: 0 - not at all  Trouble concentrating on things, such as reading the newspaper or watching television: 0 - not at all  Moving or speaking so slowly that other people could have noticed  Or the opposite - being so fidgety or restless that you have been moving around a lot more than usual: 0 - not at all  Thoughts that you would be better off dead, or of hurting yourself in some way: 0 - not at all  PHQ-2 Score: 0  PHQ-9 Score: 2           The following portions of the patient's history were reviewed and updated as appropriate:   She  has a past medical history of Altered level of consciousness, Depression, and Intellectual disability  She   Patient Active Problem List    Diagnosis Date Noted    Exposure to COVID-19 virus 02/19/2021    Migraine without aura, not intractable 11/20/2020    Diarrhea 11/20/2020    Cervical cancer screening 07/30/2020    Class 3 severe obesity in adult [facility]) 07/30/2020    Trichomoniasis 05/28/2020    Dysuria 05/21/2020    Initial Medicare annual wellness visit 10/09/2018    Tension headache 10/09/2018    Encounter for counseling regarding contraception 05/08/2018    Major depression, recurrent, chronic ([facility] [address] ) 02/22/2018    Mild intellectual disability 08/22/2016    GERD (gastroesophageal reflux disease) 09/04/2015    Obesity 07/16/2015    Fetal alcohol syndrome 12/08/2014     She  has a past surgical history that includes No past surgeries  Her family history includes No Known Problems in her father and mother  She was adopted  She  reports that she has never smoked  She has never used smokeless tobacco  She reports current alcohol use  She reports that she does not use drugs    Current Outpatient Medications   Medication Sig Dispense Refill    FLUoxetine (PROzac) 40 MG capsule Take 1 capsule (40 mg total) by mouth daily 90 capsule 3    ibuprofen (MOTRIN) 600 mg tablet Take 1 tablet (600 mg total) by mouth every 6 (six) hours as needed for mild pain 30 tablet 1    levonorgestrel (MIRENA) 20 MCG/24HR IUD 1 each by Intrauterine route once      ondansetron (ZOFRAN-ODT) 4 mg disintegrating tablet Take 1 tablet (4 mg total) by mouth every 6 (six) hours as needed for nausea or vomiting 20 tablet 0    SUMAtriptan (IMITREX) 25 mg tablet Take 1 tablet (25 mg total) by mouth once as needed for migraine for up to 1 dose 60 tablet 1     No current facility-administered medications for this visit  Current Outpatient Medications on File Prior to Visit   Medication Sig    FLUoxetine (PROzac) 40 MG capsule Take 1 capsule (40 mg total) by mouth daily    ibuprofen (MOTRIN) 600 mg tablet Take 1 tablet (600 mg total) by mouth every 6 (six) hours as needed for mild pain    levonorgestrel (MIRENA) 20 MCG/24HR IUD 1 each by Intrauterine route once    ondansetron (ZOFRAN-ODT) 4 mg disintegrating tablet Take 1 tablet (4 mg total) by mouth every 6 (six) hours as needed for nausea or vomiting    [DISCONTINUED] methocarbamol (ROBAXIN) 500 mg tablet Take 1 tablet (500 mg total) by mouth 3 (three) times a day as needed for muscle spasms    [DISCONTINUED] rizatriptan (MAXALT-MLT) 5 MG disintegrating tablet Take 1 tablet (5 mg total) by mouth once as needed for migraine for up to 1 dose May repeat in 2 hours if needed (Patient not taking: Reported on 8/30/2021)     No current facility-administered medications on file prior to visit  She has No Known Allergies       Review of Systems   Constitutional: Negative for appetite change, fatigue and fever  HENT: Negative for congestion  Respiratory: Negative for cough and shortness of breath  Gastrointestinal: Positive for nausea  Negative for abdominal pain and vomiting  Neurological: Positive for headaches  Psychiatric/Behavioral: Positive for sleep disturbance  Negative for decreased concentration and suicidal ideas           Objective:      /70 (BP Location: Left arm, Patient Position: Sitting, Cuff Size: Large)   Pulse 70   Temp (!) 96 °F (35 6 °C) (Temporal)   Wt 98 kg (216 lb)   BMI 39 51 kg/m²          Physical Exam  Constitutional:       Appearance: She is obese  HENT:      Head: Normocephalic and atraumatic  Eyes:      Extraocular Movements: Extraocular movements intact  Cardiovascular:      Rate and Rhythm: Normal rate  Pulmonary:      Effort: No respiratory distress  Breath sounds: No wheezing or rales  Musculoskeletal:      Cervical back: Neck supple  Right lower leg: No edema  Left lower leg: No edema  Skin:     General: Skin is warm and dry  Neurological:      General: No focal deficit present     Psychiatric:         Mood and Affect: Mood normal

## 2021-08-31 ENCOUNTER — TELEPHONE (OUTPATIENT)
Dept: PSYCHIATRY | Facility: CLINIC | Age: 26
End: 2021-08-31

## 2021-08-31 ENCOUNTER — OFFICE VISIT (OUTPATIENT)
Dept: BEHAVIORAL/MENTAL HEALTH CLINIC | Facility: CLINIC | Age: 26
End: 2021-08-31
Payer: COMMERCIAL

## 2021-08-31 DIAGNOSIS — F70 MILD INTELLECTUAL DISABILITY: ICD-10-CM

## 2021-08-31 DIAGNOSIS — F33.9 MAJOR DEPRESSION, RECURRENT, CHRONIC (HCC): Primary | ICD-10-CM

## 2021-08-31 PROCEDURE — 90834 PSYTX W PT 45 MINUTES: CPT | Performed by: PSYCHOLOGIST
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
## 2021-09-02 NOTE — TELEPHONE ENCOUNTER
Without the consent being signed we can't talk to Kaylie  Do you want me to call Kaylie and let her know that we don't have permission to discuss the Patient with her?

## 2021-09-07 ENCOUNTER — OFFICE VISIT (OUTPATIENT)
Dept: BEHAVIORAL/MENTAL HEALTH CLINIC | Facility: CLINIC | Age: 26
End: 2021-09-07
Payer: COMMERCIAL

## 2021-09-07 DIAGNOSIS — F70 MILD INTELLECTUAL DISABILITY: ICD-10-CM

## 2021-09-07 DIAGNOSIS — F33.9 MAJOR DEPRESSION, RECURRENT, CHRONIC (HCC): Primary | ICD-10-CM

## 2021-09-07 PROCEDURE — 90832 PSYTX W PT 30 MINUTES: CPT | Performed by: PSYCHOLOGIST

## 2021-09-07 NOTE — PSYCH
Psychotherapy Provided: Individual Psychotherapy 30 minutes     Length of time in session: 30 minutes, follow up in 1 week    Encounter Diagnosis     ICD-10-CM    1  Major depression, recurrent, chronic (HCC)  F33 9    2  Mild intellectual disability  F70        Goals addressed in session: Goal 1     Pain:      none    0    Current suicide risk : Low     Data:  Psychologist met with Cr Magana  Session was conducted In-person and lasted for 30 minutes, she was able to set an agenda which included ongoing worries regarding the court cases and living with her sisiter   Cr Magana  reported an increase in psychosocial stressors  she rated her depression as 4 on a scale of 1 (best) to 10 (worst) and noted "alittle bit there but not bad " She noted continued feelings of guilt  reagarding the court case due to "I let them () know and I made the situation worse " Psychologist attempted to create a discrepncey in Sherry's thinking using a responsibility pie  However, Agustin Mcelroy demonstrate difficulty doing so noting that she is 100% responsible and her boyfriend is only 9% responsible  Discussed the importance of evaluating her perception by looking at the evidance  She also was able to process difficulty with setting boundaries with her sister  She was able to practice the skills in session via role playing  She continue to insist that no information be shared with her , Courtney Woods  Specific techniques used in this session were: psychoeducation, problem solving approach, pro/con analysis, assertiveness skills training, cognitive restructuring, role playing and experiential techniques  Assessment:  Grooming and Hygiene were fair  and clothing was casually dressed and appropriate for weather  Ability to preform ADLs has not changed  she demonstrated apathetic mood and the affect was constricted  Cr Magana made minimal progress toward her treatment goals   she was guarded and defensive  Cindyjason New denied suicidal thoughts, homicidal thoughts and self injurious behaviors  Plan: Cindyjason New  will return to outpatient therapy on a weekly basis  During this time, Jaz New will  practice assertiveness skills training  Behavioral Health Treatment Plan [facility]: Diagnosis and Treatment Plan explained to Lottie Mercedes relates understanding diagnosis and is agreeable to Treatment Plan   Yes

## 2021-09-10 ENCOUNTER — TELEPHONE (OUTPATIENT)
Dept: PSYCHIATRY | Facility: CLINIC | Age: 26
End: 2021-09-10

## 2021-09-10 NOTE — TELEPHONE ENCOUNTER
Tried to contact Patient to confirm appt for Tuesday and the number is no longer in service  There is no other number listed for the Patient and per the Communication consent form we can not communicate with anyone other then the Patient

## 2021-09-14 ENCOUNTER — OFFICE VISIT (OUTPATIENT)
Dept: BEHAVIORAL/MENTAL HEALTH CLINIC | Facility: CLINIC | Age: 26
End: 2021-09-14
Payer: COMMERCIAL

## 2021-09-14 DIAGNOSIS — F33.9 MAJOR DEPRESSION, RECURRENT, CHRONIC (HCC): Primary | ICD-10-CM

## 2021-09-14 DIAGNOSIS — F70 MILD INTELLECTUAL DISABILITY: ICD-10-CM

## 2021-09-14 PROCEDURE — 90832 PSYTX W PT 30 MINUTES: CPT | Performed by: PSYCHOLOGIST

## 2021-09-14 NOTE — PSYCH
Psychotherapy Provided: Individual Psychotherapy 30 minutes     Length of time in session: 30 minutes, follow up in 2 weeks  Encounter Diagnosis     ICD-10-CM    1  Major depression, recurrent, chronic (HCC)  F33 9    2  Mild intellectual disability  F70        Goals addressed in session: Goal 1       Current suicide risk : Low     Data:  Psychologist met with Yana Dixon  Session was conducted In-person and lasted for 30 minutes, she was able to set an agenda which included processing the court case as well as setting boundaries with her sister  Yana Dixon rated her anxiety and depression as 6 on a scale of 1 (best) to 10 (worst)  Yana Dixon processed the result of the no contact court proceedings  She stated that the contact order was upheld  When asked about how she feels about it she noted "it sucks but it is what it is " Belkis Briscoe was able to create a cope ahead plan aimed at continued stabilization of symptoms while she waits for the final court date on November 4th  She noted "It's taking forever and I want it all to be over and I need to stay strong " 2) She also noted that her sister was not receptive to the boundaries that Belkis Briscoe previously set  She decided that she will set a boundary with her sister insisting that her sister will move out by October 1st  She was able to practice the skills in session via role playing  She stated "i love my sisiter but I have to take care myself and I can't take care of myself if i'm taking care of her  " Specific techniques used in this session were: mindfulness, problem solving approach, pro/con analysis, assertiveness skills training, empathetic listening, validation, cognitive restructuring, emotional regulation skills, role playing and experiential techniques  Assessment: Grooming and Hygiene were good  and clothing was casually dressed and appropriate for weather  Ability to preform ADLs has not changed   she demonstrated euthymic  mood and the affect was congruent  Rodriguez Bowser made minimal progress toward her treatment goals  she was cooperative  Rodriguez Bowser denied suicidal thoughts, homicidal thoughts and self injurious behaviors  Judgment and insight continues to be compromised  Plan: Rodriguez Bowser requested that the session frequency will be decreased to a bi-weekly basis  During this time, Rodriguez Bowser will practice assertiveness skills training and will implement the cope ahed plan  Behavioral Health Treatment Plan [facility]: Diagnosis and Treatment Plan explained to Silvana Mary relates understanding diagnosis and is agreeable to Treatment Plan   Yes

## 2021-09-16 ENCOUNTER — OFFICE VISIT (OUTPATIENT)
Dept: FAMILY MEDICINE CLINIC | Facility: CLINIC | Age: 26
End: 2021-09-16

## 2021-09-16 VITALS
WEIGHT: 218.6 LBS | OXYGEN SATURATION: 98 % | SYSTOLIC BLOOD PRESSURE: 122 MMHG | RESPIRATION RATE: 18 BRPM | TEMPERATURE: 98 F | DIASTOLIC BLOOD PRESSURE: 82 MMHG | BODY MASS INDEX: 40.23 KG/M2 | HEIGHT: 62 IN | HEART RATE: 88 BPM

## 2021-09-16 DIAGNOSIS — R10.2 PELVIC PAIN: Primary | ICD-10-CM

## 2021-09-16 DIAGNOSIS — N89.8 VAGINAL DISCHARGE: ICD-10-CM

## 2021-09-16 DIAGNOSIS — G43.001 MIGRAINE WITHOUT AURA AND WITH STATUS MIGRAINOSUS, NOT INTRACTABLE: ICD-10-CM

## 2021-09-16 LAB
BILIRUB UR QL STRIP: NEGATIVE
CLARITY UR: ABNORMAL
COLOR UR: YELLOW
GLUCOSE UR STRIP-MCNC: NEGATIVE MG/DL
HGB UR QL STRIP.AUTO: ABNORMAL
KETONES UR STRIP-MCNC: NEGATIVE MG/DL
LEUKOCYTE ESTERASE UR QL STRIP: ABNORMAL
NITRITE UR QL STRIP: NEGATIVE
PH UR STRIP.AUTO: 6 [PH]
PROT UR STRIP-MCNC: NEGATIVE MG/DL
SL AMB  POCT GLUCOSE, UA: NEGATIVE
SL AMB LEUKOCYTE ESTERASE,UA: ABNORMAL
SL AMB POCT BILIRUBIN,UA: NEGATIVE
SL AMB POCT BLOOD,UA: ABNORMAL
SL AMB POCT CLARITY,UA: ABNORMAL
SL AMB POCT COLOR,UA: YELLOW
SL AMB POCT KETONES,UA: NEGATIVE
SL AMB POCT NITRITE,UA: NEGATIVE
SL AMB POCT PH,UA: 5
SL AMB POCT SPECIFIC GRAVITY,UA: 1.01
SL AMB POCT URINE HCG: NEGATIVE
SL AMB POCT URINE PROTEIN: ABNORMAL
SL AMB POCT UROBILINOGEN: 0.2
SP GR UR STRIP.AUTO: 1.02 (ref 1–1.03)
UROBILINOGEN UR QL STRIP.AUTO: 0.2 E.U./DL

## 2021-09-16 PROCEDURE — 87510 GARDNER VAG DNA DIR PROBE: CPT | Performed by: FAMILY MEDICINE

## 2021-09-16 PROCEDURE — 87480 CANDIDA DNA DIR PROBE: CPT | Performed by: FAMILY MEDICINE

## 2021-09-16 PROCEDURE — 81001 URINALYSIS AUTO W/SCOPE: CPT | Performed by: FAMILY MEDICINE

## 2021-09-16 PROCEDURE — 3008F BODY MASS INDEX DOCD: CPT | Performed by: FAMILY MEDICINE

## 2021-09-16 PROCEDURE — 87086 URINE CULTURE/COLONY COUNT: CPT | Performed by: FAMILY MEDICINE

## 2021-09-16 PROCEDURE — 1036F TOBACCO NON-USER: CPT | Performed by: FAMILY MEDICINE

## 2021-09-16 PROCEDURE — 81002 URINALYSIS NONAUTO W/O SCOPE: CPT | Performed by: FAMILY MEDICINE

## 2021-09-16 PROCEDURE — 81025 URINE PREGNANCY TEST: CPT | Performed by: FAMILY MEDICINE

## 2021-09-16 PROCEDURE — 99214 OFFICE O/P EST MOD 30 MIN: CPT | Performed by: FAMILY MEDICINE

## 2021-09-16 PROCEDURE — 87660 TRICHOMONAS VAGIN DIR PROBE: CPT | Performed by: FAMILY MEDICINE

## 2021-09-16 RX ORDER — SUMATRIPTAN 25 MG/1
50 TABLET, FILM COATED ORAL ONCE AS NEEDED
Qty: 60 TABLET | Refills: 1 | Status: SHIPPED | OUTPATIENT
Start: 2021-09-16 | End: 2021-10-28 | Stop reason: SDUPTHER

## 2021-09-16 NOTE — ASSESSMENT & PLAN NOTE
Ongoing for 2 months with associated vaginal discharge  Pt attributes pain to possibly displaced IUD  Pain is localized to RLQ with TTP, no guarding or RBT  Speculum exam: white discharge, vaginal swab collected  Declined internal exam because she was uncomfortable with speculum exam  Denies fever, chills, vomiting  POC urine dip: large leuks, negative nitrites,trace blood  UPT- negative  DDx: PID, vulvovaginitis, UTI, less likely appendicitis  Pelvic US, UA with reflex ordered, will call pt with results

## 2021-09-16 NOTE — PROGRESS NOTES
Assessment/Plan:    Migraine without aura, not intractable  Uncontrolled   -Frequency of migraines reported 3 over the past week and 4 during the week prior  Increased from previously reported migraine every one to two weeks  -Imitrex seems to be helping with symptom control  -Continue with Imitrex for migraine symptom control and increase dose from one to two pills (25mg to 50mg)  -Encouraged patient to keep headache log to monitor frequency for consideration of a migraine prophylaxis medication in the future  - Follow up at next visit    Pelvic pain  Ongoing for 2 months with associated vaginal discharge  Pt attributes pain to possibly displaced IUD  Pain is localized to RLQ with TTP, no guarding or RBT  Speculum exam: white discharge, vaginal swab collected  Declined internal exam because she was uncomfortable with speculum exam  Denies fever, chills, vomiting  POC urine dip: large leuks, negative nitrites,trace blood  UPT- negative  DDx: PID, vulvovaginitis, UTI, less likely appendicitis  Pelvic US, UA with reflex ordered, will call pt with results                 Problem List Items Addressed This Visit        Other    Pelvic pain - Primary    Relevant Orders    POCT urine HCG (Completed)    POCT urine dip (Completed)            Subjective:      Patient ID: Aren Mazariegos is a 27 y o  female  27year old female following up at 2 weeks after switching from Maxalt to Imitrex for control of migraine with aura  She mentioned that she fells like the new medication is working well  She reports the migraine frequency has been 3 headaches over the past week and 4 the week prior  This is an increase in frequency from the previously reported migraine every one to two weeks at her visit two weeks ago  She also reported aura symptoms including nausea and dizziness prior to migraine onset  These symptoms are not always present prior to episodes   She also mentioned she is having these symptoms during her migraines as well as photophobia and phonophobia  She is interested in continuing with Imitrex to see if it leads to better control of her migraines  She also reported that she is experiencing pelvic pain on the right and left and mentioned she thought it was due to her IUD  She reported this pain has been going on for months and is more on the right side  She has also been experiencing discharge that is a light white color  There is no itching present  There is pain present with urination but not a burning pain  The following portions of the patient's history were reviewed and updated as appropriate: allergies, current medications, past family history, past medical history, past social history, past surgical history and problem list     Review of Systems   Constitutional: Negative for chills, fatigue and fever  Eyes:        Photophobia only during migraine episodes   Respiratory: Negative for cough and shortness of breath  Cardiovascular: Negative for chest pain  Gastrointestinal: Positive for abdominal pain  Genitourinary: Positive for pelvic pain and vaginal discharge  Neurological: Positive for headaches  Objective:      /82 (BP Location: Left arm, Patient Position: Sitting, Cuff Size: Large)   Pulse 88   Temp 98 °F (36 7 °C) (Temporal)   Resp 18   Ht 5' 2" (1 575 m)   Wt 99 2 kg (218 lb 9 6 oz)   SpO2 98%   BMI 39 98 kg/m²          Physical Exam  Constitutional:       Appearance: Normal appearance  HENT:      Head: Normocephalic  Eyes:      General: No scleral icterus  Conjunctiva/sclera: Conjunctivae normal    Cardiovascular:      Rate and Rhythm: Normal rate and regular rhythm  Pulmonary:      Effort: Pulmonary effort is normal  No respiratory distress  Breath sounds: Normal breath sounds  No stridor  No wheezing, rhonchi or rales  Abdominal:      Tenderness: There is abdominal tenderness  There is no rebound        Comments: RLQ tenderness with deep palpation, stronger with rebound  Palpation in other quadrants elicites pain in the RLQ  Neurological:      General: No focal deficit present  Mental Status: She is alert        Gait: Gait normal    Psychiatric:         Mood and Affect: Mood normal          Behavior: Behavior normal

## 2021-09-16 NOTE — ASSESSMENT & PLAN NOTE
Uncontrolled   -Frequency of migraines reported 3 over the past week and 4 during the week prior  Increased from previously reported migraine every one to two weeks  -Imitrex seems to be helping with symptom control  -Continue with Imitrex for migraine symptom control and increase dose from one to two pills (25mg to 50mg)  -Encouraged patient to keep headache log to monitor frequency for consideration of a migraine prophylaxis medication in the future     - Follow up at next visit

## 2021-09-17 ENCOUNTER — TELEPHONE (OUTPATIENT)
Dept: FAMILY MEDICINE CLINIC | Facility: CLINIC | Age: 26
End: 2021-09-17

## 2021-09-17 DIAGNOSIS — N30.90 CYSTITIS: ICD-10-CM

## 2021-09-17 DIAGNOSIS — B37.3 VULVOVAGINAL CANDIDIASIS: Primary | ICD-10-CM

## 2021-09-17 LAB
BACTERIA UR QL AUTO: ABNORMAL /HPF
CANDIDA RRNA VAG QL PROBE: POSITIVE
G VAGINALIS RRNA GENITAL QL PROBE: POSITIVE
NON-SQ EPI CELLS URNS QL MICRO: ABNORMAL /HPF
OTHER STN SPEC: ABNORMAL
RBC #/AREA URNS AUTO: ABNORMAL /HPF
T VAGINALIS RRNA GENITAL QL PROBE: NEGATIVE
WBC #/AREA URNS AUTO: ABNORMAL /HPF

## 2021-09-17 RX ORDER — NITROFURANTOIN 25; 75 MG/1; MG/1
100 CAPSULE ORAL 2 TIMES DAILY
Qty: 10 CAPSULE | Refills: 0 | Status: SHIPPED | OUTPATIENT
Start: 2021-09-17 | End: 2021-09-22

## 2021-09-17 RX ORDER — FLUCONAZOLE 150 MG/1
150 TABLET ORAL ONCE
Qty: 1 TABLET | Refills: 0 | Status: SHIPPED | OUTPATIENT
Start: 2021-09-17 | End: 2021-09-17

## 2021-09-18 LAB — BACTERIA UR CULT: NORMAL

## 2021-09-28 ENCOUNTER — HOSPITAL ENCOUNTER (OUTPATIENT)
Dept: ULTRASOUND IMAGING | Facility: HOSPITAL | Age: 26
Discharge: HOME | End: 2021-09-28
Payer: COMMERCIAL

## 2021-09-28 ENCOUNTER — TELEPHONE (OUTPATIENT)
Dept: FAMILY MEDICINE CLINIC | Facility: CLINIC | Age: 26
End: 2021-09-28

## 2021-09-28 ENCOUNTER — OFFICE VISIT (OUTPATIENT)
Dept: BEHAVIORAL/MENTAL HEALTH CLINIC | Facility: CLINIC | Age: 26
End: 2021-09-28
Payer: COMMERCIAL

## 2021-09-28 DIAGNOSIS — R10.2 PELVIC PAIN: ICD-10-CM

## 2021-09-28 DIAGNOSIS — F70 MILD INTELLECTUAL DISABILITY: ICD-10-CM

## 2021-09-28 DIAGNOSIS — F33.9 MAJOR DEPRESSION, RECURRENT, CHRONIC (HCC): Primary | ICD-10-CM

## 2021-09-28 PROCEDURE — 76856 US EXAM PELVIC COMPLETE: CPT

## 2021-09-28 PROCEDURE — 76705 ECHO EXAM OF ABDOMEN: CPT

## 2021-09-28 PROCEDURE — 76830 TRANSVAGINAL US NON-OB: CPT

## 2021-09-28 PROCEDURE — 90832 PSYTX W PT 30 MINUTES: CPT | Performed by: PSYCHOLOGIST

## 2021-09-28 NOTE — PSYCH
Psychotherapy Provided: Individual Psychotherapy 25 minutes     Length of time in session: 25 minutes, follow up in 2 week    Encounter Diagnosis     ICD-10-CM    1  Major depression, recurrent, chronic (HCC)  F33 9    2  Mild intellectual disability  F70        Goals addressed in session: Goal 1       Current suicide risk : Low     Data:  Psychologist met with April Forrester  Session was conducted In-person and lasted for 25 minutes  April Forrester  Reported stabilization of symptoms  she rated her depression as 5 on a scale of 1 (best) to 10 (worst)  April Forrester processed ongoing stressors which include interpersonal realtionships  She reported "taking care of my sister is stressful when I have to take care myself " Kenneth Gar was able to practice assertivness skills as her sister's deadline to move is coming up  She was able to practice the skills via role playing  Specific techniques used in this session were: problem solving approach, pro/con analysis, assertiveness skills training, empathetic listening, validation, role playing and experiential techniques  Assessment: Grooming and Hygiene were good  and clothing was casually dressed and appropriate for weather  Ability to preform ADLs has not changed  she demonstrated euthymic  mood and the affect was congruent  April Forrester made minimal progress toward her treatment goals Continues to lack insight into presenting problems  she was cooperative  April Forrester denied suicidal thoughts, homicidal thoughts and self injurious behaviors  Plan: April Forrester  will return to outpatient therapy on a bi-weekly basis  During this time, April Forrester will practice assertiveness skills training  Behavioral Health Treatment Plan [facility]: Diagnosis and Treatment Plan explained to Candido Benitez relates understanding diagnosis and is agreeable to Treatment Plan   Yes

## 2021-10-04 ENCOUNTER — OFFICE VISIT (OUTPATIENT)
Dept: FAMILY MEDICINE CLINIC | Facility: CLINIC | Age: 26
End: 2021-10-04

## 2021-10-04 ENCOUNTER — TELEPHONE (OUTPATIENT)
Dept: FAMILY MEDICINE CLINIC | Facility: CLINIC | Age: 26
End: 2021-10-04

## 2021-10-04 VITALS
BODY MASS INDEX: 40.41 KG/M2 | HEART RATE: 80 BPM | SYSTOLIC BLOOD PRESSURE: 130 MMHG | TEMPERATURE: 97.7 F | DIASTOLIC BLOOD PRESSURE: 88 MMHG | RESPIRATION RATE: 18 BRPM | WEIGHT: 219.6 LBS | HEIGHT: 62 IN

## 2021-10-04 DIAGNOSIS — Z30.432 ENCOUNTER FOR IUD REMOVAL: ICD-10-CM

## 2021-10-04 DIAGNOSIS — B37.3 VULVOVAGINAL CANDIDIASIS: Primary | ICD-10-CM

## 2021-10-04 PROCEDURE — 58301 REMOVE INTRAUTERINE DEVICE: CPT | Performed by: FAMILY MEDICINE

## 2021-10-04 RX ORDER — FLUCONAZOLE 150 MG/1
150 TABLET ORAL ONCE
Qty: 2 TABLET | Refills: 0 | Status: SHIPPED | OUTPATIENT
Start: 2021-10-04 | End: 2021-10-04

## 2021-10-12 ENCOUNTER — OFFICE VISIT (OUTPATIENT)
Dept: BEHAVIORAL/MENTAL HEALTH CLINIC | Facility: CLINIC | Age: 26
End: 2021-10-12
Payer: COMMERCIAL

## 2021-10-12 DIAGNOSIS — F33.9 MAJOR DEPRESSION, RECURRENT, CHRONIC (HCC): Primary | ICD-10-CM

## 2021-10-12 PROCEDURE — 90832 PSYTX W PT 30 MINUTES: CPT | Performed by: PSYCHOLOGIST

## 2021-10-22 ENCOUNTER — TELEPHONE (OUTPATIENT)
Dept: PSYCHIATRY | Facility: CLINIC | Age: 26
End: 2021-10-22

## 2021-10-26 ENCOUNTER — OFFICE VISIT (OUTPATIENT)
Dept: BEHAVIORAL/MENTAL HEALTH CLINIC | Facility: CLINIC | Age: 26
End: 2021-10-26
Payer: COMMERCIAL

## 2021-10-26 DIAGNOSIS — F70 MILD INTELLECTUAL DISABILITY: ICD-10-CM

## 2021-10-26 DIAGNOSIS — F33.9 MAJOR DEPRESSION, RECURRENT, CHRONIC (HCC): Primary | ICD-10-CM

## 2021-10-26 PROCEDURE — 90832 PSYTX W PT 30 MINUTES: CPT | Performed by: PSYCHOLOGIST

## 2021-10-28 ENCOUNTER — OFFICE VISIT (OUTPATIENT)
Dept: FAMILY MEDICINE CLINIC | Facility: CLINIC | Age: 26
End: 2021-10-28

## 2021-10-28 VITALS
RESPIRATION RATE: 16 BRPM | DIASTOLIC BLOOD PRESSURE: 80 MMHG | TEMPERATURE: 97.8 F | BODY MASS INDEX: 40.85 KG/M2 | SYSTOLIC BLOOD PRESSURE: 118 MMHG | HEART RATE: 80 BPM | HEIGHT: 62 IN | WEIGHT: 222 LBS

## 2021-10-28 DIAGNOSIS — Z23 ENCOUNTER FOR IMMUNIZATION: ICD-10-CM

## 2021-10-28 DIAGNOSIS — G43.001 MIGRAINE WITHOUT AURA AND WITH STATUS MIGRAINOSUS, NOT INTRACTABLE: Primary | ICD-10-CM

## 2021-10-28 PROCEDURE — 90682 RIV4 VACC RECOMBINANT DNA IM: CPT | Performed by: FAMILY MEDICINE

## 2021-10-28 PROCEDURE — 3008F BODY MASS INDEX DOCD: CPT | Performed by: FAMILY MEDICINE

## 2021-10-28 PROCEDURE — 1036F TOBACCO NON-USER: CPT | Performed by: FAMILY MEDICINE

## 2021-10-28 PROCEDURE — 99214 OFFICE O/P EST MOD 30 MIN: CPT | Performed by: FAMILY MEDICINE

## 2021-10-28 PROCEDURE — G0008 ADMIN INFLUENZA VIRUS VAC: HCPCS | Performed by: FAMILY MEDICINE

## 2021-10-28 RX ORDER — SUMATRIPTAN 25 MG/1
50 TABLET, FILM COATED ORAL ONCE AS NEEDED
Qty: 60 TABLET | Refills: 2 | Status: SHIPPED | OUTPATIENT
Start: 2021-10-28

## 2021-11-09 ENCOUNTER — OFFICE VISIT (OUTPATIENT)
Dept: BEHAVIORAL/MENTAL HEALTH CLINIC | Facility: CLINIC | Age: 26
End: 2021-11-09
Payer: COMMERCIAL

## 2021-11-09 DIAGNOSIS — F33.9 MAJOR DEPRESSION, RECURRENT, CHRONIC (HCC): Primary | ICD-10-CM

## 2021-11-09 DIAGNOSIS — F70 MILD INTELLECTUAL DISABILITY: ICD-10-CM

## 2021-11-09 PROCEDURE — 90832 PSYTX W PT 30 MINUTES: CPT | Performed by: PSYCHOLOGIST

## 2021-11-23 ENCOUNTER — OFFICE VISIT (OUTPATIENT)
Dept: BEHAVIORAL/MENTAL HEALTH CLINIC | Facility: CLINIC | Age: 26
End: 2021-11-23
Payer: COMMERCIAL

## 2021-11-23 DIAGNOSIS — F70 MILD INTELLECTUAL DISABILITY: ICD-10-CM

## 2021-11-23 DIAGNOSIS — F33.9 MAJOR DEPRESSION, RECURRENT, CHRONIC (HCC): Primary | ICD-10-CM

## 2021-11-23 PROCEDURE — 90832 PSYTX W PT 30 MINUTES: CPT | Performed by: PSYCHOLOGIST

## 2021-11-29 RX ORDER — SUMATRIPTAN 50 MG/1
TABLET, FILM COATED ORAL
COMMUNITY
Start: 2021-09-17

## 2021-11-29 RX ORDER — FLUCONAZOLE 150 MG/1
TABLET ORAL
COMMUNITY
Start: 2021-10-04 | End: 2022-02-10 | Stop reason: ALTCHOICE

## 2021-11-30 ENCOUNTER — TELEPHONE (OUTPATIENT)
Dept: FAMILY MEDICINE CLINIC | Facility: CLINIC | Age: 26
End: 2021-11-30

## 2021-11-30 ENCOUNTER — OFFICE VISIT (OUTPATIENT)
Dept: FAMILY MEDICINE CLINIC | Facility: CLINIC | Age: 26
End: 2021-11-30

## 2021-11-30 VITALS
SYSTOLIC BLOOD PRESSURE: 126 MMHG | WEIGHT: 219.8 LBS | HEIGHT: 62 IN | RESPIRATION RATE: 18 BRPM | DIASTOLIC BLOOD PRESSURE: 82 MMHG | BODY MASS INDEX: 40.45 KG/M2 | TEMPERATURE: 98.2 F | OXYGEN SATURATION: 93 % | HEART RATE: 102 BPM

## 2021-11-30 DIAGNOSIS — Z00.00 MEDICARE ANNUAL WELLNESS VISIT, SUBSEQUENT: Primary | ICD-10-CM

## 2021-11-30 DIAGNOSIS — Z23 ENCOUNTER FOR IMMUNIZATION: ICD-10-CM

## 2021-11-30 PROCEDURE — 1036F TOBACCO NON-USER: CPT | Performed by: FAMILY MEDICINE

## 2021-11-30 PROCEDURE — 90471 IMMUNIZATION ADMIN: CPT | Performed by: FAMILY MEDICINE

## 2021-11-30 PROCEDURE — 3008F BODY MASS INDEX DOCD: CPT | Performed by: FAMILY MEDICINE

## 2021-11-30 PROCEDURE — 90715 TDAP VACCINE 7 YRS/> IM: CPT | Performed by: FAMILY MEDICINE

## 2021-11-30 PROCEDURE — G0438 PPPS, INITIAL VISIT: HCPCS | Performed by: FAMILY MEDICINE

## 2021-12-08 ENCOUNTER — OFFICE VISIT (OUTPATIENT)
Dept: BEHAVIORAL/MENTAL HEALTH CLINIC | Facility: CLINIC | Age: 26
End: 2021-12-08
Payer: COMMERCIAL

## 2021-12-08 DIAGNOSIS — F41.9 ANXIETY: ICD-10-CM

## 2021-12-08 DIAGNOSIS — F33.9 MAJOR DEPRESSION, RECURRENT, CHRONIC (HCC): Primary | ICD-10-CM

## 2021-12-08 PROCEDURE — 90832 PSYTX W PT 30 MINUTES: CPT | Performed by: PSYCHOLOGIST

## 2022-01-11 ENCOUNTER — OFFICE VISIT (OUTPATIENT)
Dept: BEHAVIORAL/MENTAL HEALTH CLINIC | Facility: CLINIC | Age: 27
End: 2022-01-11
Payer: COMMERCIAL

## 2022-01-11 DIAGNOSIS — F41.9 ANXIETY: ICD-10-CM

## 2022-01-11 DIAGNOSIS — F70 MILD INTELLECTUAL DISABILITY: ICD-10-CM

## 2022-01-11 DIAGNOSIS — F33.9 MAJOR DEPRESSION, RECURRENT, CHRONIC (HCC): Primary | ICD-10-CM

## 2022-01-11 PROCEDURE — 90832 PSYTX W PT 30 MINUTES: CPT | Performed by: PSYCHOLOGIST

## 2022-01-11 NOTE — PSYCH
Psychotherapy Provided: Individual Psychotherapy 35 minutes     Length of time in session: 35 minutes, follow up in 2 weeks    Encounter Diagnosis     ICD-10-CM    1  Major depression, recurrent, chronic (HCC)  F33 9    2  Anxiety  F41 9    3  Mild intellectual disability  F70        Goals addressed in session: Goal 1     Current suicide risk : Low      Data:  Psychologist met with Rasheed Gambino  Session was conducted In-person and lasted for 35 minutes  Deepthikassy Gambino  reported ongoing clinical symptoms in which she rated her anxiety and depression as 8 on a scale of 1 (best) to 10 (worst)  Rasheed Maki processed several transitions that occurred within the past month such as the result of the court hearing and getting a job  Discussed things that she can control vs  Things that she can't  Created an activity schedule aimed at increasing motivation and accountability  Reviewed coping skills  In session, Wilder Adams was able to complete an action plan  Specific techniques used in this session were: psychoeducation, problem solving approach, pro/con analysis, assertiveness skills training, empathetic listening, validation, cognitive restructuring, emotional regulation skills, role playing and experiential techniques  Assessment: Rasheed Maki  was oriented to person, place, time and situation  Grooming and Hygiene were good  and clothing was casually dressed and appropriate for weather  Ability to preform ADLs has improved  she demonstrated content mood and the affect was congruent  Rasheed Gambino made minimal progress toward her treatment goals  she was cooperative  Rasheed Gambino denied suicidal thoughts, homicidal thoughts and self injurious behaviors  Plan: Rasheed Gambino  will return to outpatient therapy on a bi-weekly basis  During this time, Rasheed Gambino will practice coping skills and will implement the activity schedule       Behavioral Health Treatment Plan [facility]: Diagnosis and Treatment Plan explained to Gerhardt Ralph relates understanding diagnosis and is agreeable to Treatment Plan   Yes

## 2022-01-11 NOTE — BH TREATMENT PLAN
Hannon China  1995       Date of Initial Treatment Plan: 08/17/21   Date of Current Treatment Plan: 01/11/22    Treatment Plan Number 2     Strengths/Personal Resources for Self Care: "I'm a nice person and I want to help others "    Diagnosis:   1  Major depression, recurrent, chronic ([address] )     2  Anxiety     3  Mild intellectual disability         Area of Needs: "math, help me cope with my depression and my anxiety "      Long Term Goal 1: "help with depression "     Target Date: 5/22-7/22  Completion Date: TBD         Short Term Objectives for Goal 1: "by giving me goals and helping me get through it "       Short Term Objectives for Goal 1: attend therapy as scheduled and take medications as prescribed  Short Term Objectives for Goal 1: Focus on internal validation as opposed to external validation  Short Term Objectives for Goal 1: Recognize strength by identifying positive traits  Short Term Objectives for Goal 1: Setting appropriate boundaries "letting them know whose the boss "       Short Term Objectives for Goal 1: Practice coping skills, emotional regulation skills and distress tolerance skills  Short Term Objectives for Goal 1: Continue abstinence of SIB  Long Term Goal 2: N/A    Target Date: N/A  Completion Date: N/A    Short Term Objectives for Goal 2: N/A         Long Term Goal # 3: N/A     Target Date: N/A  Completion Date: N/A    Short Term Objectives for Goal 3: N/A    GOAL 1: Modality: Individual 2x per month   Completion Date TBD, Medication Management and The person(s) responsible for carrying out the plan is  Sherry and her support staff     GOAL 2: Modality: N/A    GOAL 3: Modality:  N/A      Behavioral Health Treatment Plan [facility]: Diagnosis and Treatment Plan explained to Wilton Schumacher relates understanding diagnosis and is agreeable to Treatment Plan         Client Comments : Please share your thoughts, feelings, need and/or experiences regarding your treatment plan: N/A

## 2022-01-14 ENCOUNTER — OFFICE VISIT (OUTPATIENT)
Dept: FAMILY MEDICINE CLINIC | Facility: CLINIC | Age: 27
End: 2022-01-14

## 2022-01-14 VITALS
HEIGHT: 63 IN | DIASTOLIC BLOOD PRESSURE: 90 MMHG | TEMPERATURE: 97.9 F | HEART RATE: 68 BPM | BODY MASS INDEX: 38.8 KG/M2 | SYSTOLIC BLOOD PRESSURE: 138 MMHG | WEIGHT: 219 LBS | RESPIRATION RATE: 18 BRPM

## 2022-01-14 DIAGNOSIS — G44.209 TENSION HEADACHE: ICD-10-CM

## 2022-01-14 DIAGNOSIS — S29.019A ACUTE THORACIC MYOFASCIAL STRAIN, INITIAL ENCOUNTER: Primary | ICD-10-CM

## 2022-01-14 DIAGNOSIS — G43.009 MIGRAINE WITHOUT AURA AND WITHOUT STATUS MIGRAINOSUS, NOT INTRACTABLE: ICD-10-CM

## 2022-01-14 PROCEDURE — 99213 OFFICE O/P EST LOW 20 MIN: CPT | Performed by: FAMILY MEDICINE

## 2022-01-14 RX ORDER — ONDANSETRON 4 MG/1
4 TABLET, ORALLY DISINTEGRATING ORAL EVERY 6 HOURS PRN
Qty: 20 TABLET | Refills: 0 | Status: SHIPPED | OUTPATIENT
Start: 2022-01-14

## 2022-01-14 RX ORDER — IBUPROFEN 600 MG/1
600 TABLET ORAL EVERY 6 HOURS PRN
Qty: 30 TABLET | Refills: 1 | Status: SHIPPED | OUTPATIENT
Start: 2022-01-14

## 2022-01-14 NOTE — PROGRESS NOTES
Assessment/Plan:    Migraine without aura, not intractable  Patient with history of migraines  -patient wants refill of Zofran from nausea and related to migraines  -follow-up next week to PCP (Dr Kiera Wallace)    Acute thoracic myofascial strain  Acute thoracic strain with musculoskeletal hypertonicity along the midthoracic spine left greater than right  Patient also has lipoma in the same region, mild but fluctuant  No pain on palpation of the lipoma  No need for general surgery referral at this time for removal   -refilled ibuprofen 600 mg which the patient takes for headaches/migraines  -prescribed Voltaren 1% gel to be use up to 4 times daily for musculoskeletal pain  -described OMT to the patient and the patient will consider it if the pain does not resolve by next week  -follow-up next week to PCP (Dr Kiera Wallace)    Tension headache  Stable  -refilled ibuprofen 60 mg to be used every 6 hours p r n   -follow-up next week to PCP (Dr Kiera Wallace)         Problem List Items Addressed This Visit        Cardiovascular and Mediastinum    Migraine without aura, not intractable     Patient with history of migraines  -patient wants refill of Zofran from nausea and related to migraines  -follow-up next week to PCP (Dr Kiera Wallace)         Relevant Medications    ibuprofen (MOTRIN) 600 mg tablet    ondansetron (ZOFRAN-ODT) 4 mg disintegrating tablet       Musculoskeletal and Integument    Acute thoracic myofascial strain - Primary     Acute thoracic strain with musculoskeletal hypertonicity along the midthoracic spine left greater than right  Patient also has lipoma in the same region, mild but fluctuant  No pain on palpation of the lipoma    No need for general surgery referral at this time for removal   -refilled ibuprofen 600 mg which the patient takes for headaches/migraines  -prescribed Voltaren 1% gel to be use up to 4 times daily for musculoskeletal pain  -described OMT to the patient and the patient will consider it if the pain does not resolve by next week  -follow-up next week to PCP (Dr Bebe Argueta)         Relevant Medications    Diclofenac Sodium (VOLTAREN) 1 %       Other    Tension headache     Stable  -refilled ibuprofen 60 mg to be used every 6 hours p r n   -follow-up next week to PCP (Dr Bebe Argueta)         Relevant Medications    ibuprofen (MOTRIN) 600 mg tablet            Subjective:      Patient ID: Cleopatra Diaz is a 27 y o  female  Patient is a 17-year-old female with past medical history of migraines presenting with acute thoracic strain/pain for the past couple days  Patient states that she recently started a new job 2-3 weeks ago at [employer] where she does some walking along with lifting  She states this is new activity for her and she lives a sedentary lifestyle otherwise prior to  She states that the pain is worsened with twisting movement along with palpation of the area  She has no radiation of the pain at this time and states it is a 4 or 5/10 with a dull constant pain  No other concerns today  The following portions of the patient's history were reviewed and updated as appropriate:   She  has a past medical history of Altered level of consciousness, Depression, and Intellectual disability    She   Patient Active Problem List    Diagnosis Date Noted    Acute thoracic myofascial strain 01/14/2022    Anxiety 12/08/2021    Pelvic pain 09/16/2021    Exposure to COVID-19 virus 02/19/2021    Migraine without aura, not intractable 11/20/2020    Diarrhea 11/20/2020    Cervical cancer screening 07/30/2020    Class 3 severe obesity in adult [facility]) 07/30/2020    Trichomoniasis 05/28/2020    Dysuria 05/21/2020    Medicare annual wellness visit, subsequent 10/09/2018    Tension headache 10/09/2018    Encounter for counseling regarding contraception 05/08/2018    Major depression, recurrent, chronic ([facility] [address] ) 02/22/2018    Mild intellectual disability 08/22/2016    GERD (gastroesophageal reflux disease) 09/04/2015    Obesity 07/16/2015    Fetal alcohol syndrome 12/08/2014     She  has a past surgical history that includes No past surgeries  Her family history includes No Known Problems in her father and mother  She was adopted  She  reports that she has never smoked  She has never used smokeless tobacco  She reports current alcohol use  She reports that she does not use drugs  Current Outpatient Medications   Medication Sig Dispense Refill    FLUoxetine (PROzac) 40 MG capsule Take 1 capsule (40 mg total) by mouth daily 90 capsule 3    ibuprofen (MOTRIN) 600 mg tablet Take 1 tablet (600 mg total) by mouth every 6 (six) hours as needed for mild pain 30 tablet 1    ondansetron (ZOFRAN-ODT) 4 mg disintegrating tablet Take 1 tablet (4 mg total) by mouth every 6 (six) hours as needed for nausea or vomiting 20 tablet 0    SUMAtriptan (IMITREX) 50 mg tablet       Diclofenac Sodium (VOLTAREN) 1 % Apply 2 g topically 4 (four) times a day 50 g 1    fluconazole (DIFLUCAN) 150 mg tablet  (Patient not taking: Reported on 1/14/2022 )      SUMAtriptan (IMITREX) 25 mg tablet Take 2 tablets (50 mg total) by mouth once as needed for migraine for up to 1 dose (Patient not taking: Reported on 1/14/2022 ) 60 tablet 2     No current facility-administered medications for this visit       Current Outpatient Medications on File Prior to Visit   Medication Sig    FLUoxetine (PROzac) 40 MG capsule Take 1 capsule (40 mg total) by mouth daily    SUMAtriptan (IMITREX) 50 mg tablet     [DISCONTINUED] ibuprofen (MOTRIN) 600 mg tablet Take 1 tablet (600 mg total) by mouth every 6 (six) hours as needed for mild pain    [DISCONTINUED] ondansetron (ZOFRAN-ODT) 4 mg disintegrating tablet Take 1 tablet (4 mg total) by mouth every 6 (six) hours as needed for nausea or vomiting    fluconazole (DIFLUCAN) 150 mg tablet  (Patient not taking: Reported on 1/14/2022 )    SUMAtriptan (IMITREX) 25 mg tablet Take 2 tablets (50 mg total) by mouth once as needed for migraine for up to 1 dose (Patient not taking: Reported on 1/14/2022 )     No current facility-administered medications on file prior to visit  She has No Known Allergies       Review of Systems   Constitutional: Negative for chills and fever  HENT: Negative for ear pain and sore throat  Eyes: Negative for pain and visual disturbance  Respiratory: Negative for cough and shortness of breath  Cardiovascular: Negative for chest pain and palpitations  Gastrointestinal: Negative for abdominal pain and vomiting  Genitourinary: Negative for dysuria and hematuria  Musculoskeletal: Positive for back pain and myalgias  Negative for arthralgias  Skin: Negative for color change and rash  Neurological: Negative for seizures and syncope  All other systems reviewed and are negative  Objective:      /90 (BP Location: Left arm, Patient Position: Sitting, Cuff Size: Standard)   Pulse 68   Temp 97 9 °F (36 6 °C) (Temporal)   Resp 18   Ht 5' 2 5" (1 588 m)   Wt 99 3 kg (219 lb)   BMI 39 42 kg/m²          Physical Exam  Vitals reviewed  Constitutional:       General: She is not in acute distress  Appearance: Normal appearance  She is obese  She is not ill-appearing or diaphoretic  HENT:      Head: Normocephalic and atraumatic  Nose: Nose normal  No congestion or rhinorrhea  Mouth/Throat:      Mouth: Mucous membranes are moist       Pharynx: Oropharynx is clear  No oropharyngeal exudate  Eyes:      General: No scleral icterus  Conjunctiva/sclera: Conjunctivae normal       Pupils: Pupils are equal, round, and reactive to light  Cardiovascular:      Rate and Rhythm: Normal rate and regular rhythm  Pulses: Normal pulses  Heart sounds: No murmur heard  Pulmonary:      Effort: Pulmonary effort is normal       Breath sounds: Normal breath sounds  No wheezing  Chest:      Chest wall: No tenderness  Abdominal:      General: Bowel sounds are normal       Palpations: Abdomen is soft  There is no mass  Tenderness: There is no abdominal tenderness  Musculoskeletal:         General: Tenderness present  Normal range of motion  Cervical back: Normal range of motion  Comments: Tenderness on palpation of the left thoracic muscle belly around T8-T10  Left worse than the right   Skin:     General: Skin is warm  Capillary Refill: Capillary refill takes less than 2 seconds  Findings: No bruising or rash  Neurological:      Mental Status: She is alert and oriented to person, place, and time     Psychiatric:         Mood and Affect: Mood normal          Behavior: Behavior normal          Mickey Perez DO  Family Medicine Resident, PGY2  3:34 PM

## 2022-01-14 NOTE — ASSESSMENT & PLAN NOTE
Acute thoracic strain with musculoskeletal hypertonicity along the midthoracic spine left greater than right  Patient also has lipoma in the same region, mild but fluctuant  No pain on palpation of the lipoma  No need for general surgery referral at this time for removal   -refilled ibuprofen 600 mg which the patient takes for headaches/migraines  -prescribed Voltaren 1% gel to be use up to 4 times daily for musculoskeletal pain  -described OMT to the patient and the patient will consider it if the pain does not resolve by next week    -follow-up next week to PCP (Dr Olya Serna)

## 2022-01-14 NOTE — ASSESSMENT & PLAN NOTE
Stable  -refilled ibuprofen 60 mg to be used every 6 hours p r n   -follow-up next week to PCP (Dr Belle Spicer)

## 2022-01-14 NOTE — ASSESSMENT & PLAN NOTE
Patient with history of migraines    -patient wants refill of Zofran from nausea and related to migraines  -follow-up next week to PCP (Dr Sariah Motta)

## 2022-01-25 ENCOUNTER — TELEPHONE (OUTPATIENT)
Dept: BEHAVIORAL/MENTAL HEALTH CLINIC | Facility: CLINIC | Age: 27
End: 2022-01-25

## 2022-01-25 ENCOUNTER — OFFICE VISIT (OUTPATIENT)
Dept: BEHAVIORAL/MENTAL HEALTH CLINIC | Facility: CLINIC | Age: 27
End: 2022-01-25
Payer: COMMERCIAL

## 2022-01-25 ENCOUNTER — TELEPHONE (OUTPATIENT)
Dept: PSYCHIATRY | Facility: CLINIC | Age: 27
End: 2022-01-25

## 2022-01-25 DIAGNOSIS — F41.9 ANXIETY: ICD-10-CM

## 2022-01-25 DIAGNOSIS — F33.9 MAJOR DEPRESSION, RECURRENT, CHRONIC (HCC): Primary | ICD-10-CM

## 2022-01-25 PROCEDURE — 90832 PSYTX W PT 30 MINUTES: CPT | Performed by: PSYCHOLOGIST

## 2022-01-25 NOTE — TELEPHONE ENCOUNTER
Spoke to Kaylie and provided current therapeutic progress  Discussed client's rigidity  Kaylie noted that Wilfredo Cantu hasn't been taking her medication   Discussed the  importance of ensuring that she takes medication and she needed a referral for neuropsych

## 2022-02-08 ENCOUNTER — OFFICE VISIT (OUTPATIENT)
Dept: BEHAVIORAL/MENTAL HEALTH CLINIC | Facility: CLINIC | Age: 27
End: 2022-02-08
Payer: COMMERCIAL

## 2022-02-08 DIAGNOSIS — F41.9 ANXIETY: ICD-10-CM

## 2022-02-08 DIAGNOSIS — F33.9 MAJOR DEPRESSION, RECURRENT, CHRONIC (HCC): Primary | ICD-10-CM

## 2022-02-08 PROCEDURE — 90832 PSYTX W PT 30 MINUTES: CPT | Performed by: PSYCHOLOGIST

## 2022-02-08 NOTE — PSYCH
Psychotherapy Provided: Individual Psychotherapy 35 minutes     Length of time in session: 35 minutes, follow up in 2 weeks  Encounter Diagnosis     ICD-10-CM    1  Major depression, recurrent, chronic (HCC)  F33 9    2  Anxiety  F41 9        Goals addressed in session: Goal 1     Current suicide risk : Low     Data:  Psychologist met with Feroz Perdomo  Session was conducted In-person and lasted for 35 minutes  Feroz Perdomo  reported ongoing symptoms  she rated ter anxiety and depression as 5 on a scale of 1 (best) to 10 (worst)  Feroz Perdomo processed  "playing the waiting game" when it comes to resuming full contact with her boyfriend  Using imaginal exposure, Luna Gillette was able to process feelings of "frustration and disrespect " Reviewed assertiveness skills training  She also processed a desire to change her position at work  Used problem solving strategy to identfy different department/ positions that she could work  Subsequently, completed a pro/con analysis and based on that she was able to create an action plan  Specific techniques used in this session were: problem solving approach, pro/con analysis, assertiveness skills training, empathetic listening, validation, cognitive restructuring, emotional regulation skills, motivational interviewing, distress tolerance, role playing and experiential techniques  Assessment: Feroz Perdomo  was oriented to person, place, time and situation  Grooming and Hygiene were good  and clothing was casually dressed and appropriate for weather  Ability to preform ADLs has not changed  she demonstrated content mood and the affect was congruent  Feroz Perdomo made minimal progress toward her treatment goals  she was cooperative  Feroz Perdomo denied suicidal thoughts, homicidal thoughts and self injurious behaviors  Plan: Feroz Perdomo  will return to outpatient therapy on a bi-weekly basis   During this time, Ren Correia will practice practice assertiveness skills training and will implement the action plan  Behavioral Health Treatment Plan [facility]: Diagnosis and Treatment Plan explained to Gerhardt Ralph relates understanding diagnosis and is agreeable to Treatment Plan   Yes

## 2022-02-10 ENCOUNTER — OFFICE VISIT (OUTPATIENT)
Dept: FAMILY MEDICINE CLINIC | Facility: CLINIC | Age: 27
End: 2022-02-10

## 2022-02-10 VITALS
HEART RATE: 90 BPM | SYSTOLIC BLOOD PRESSURE: 127 MMHG | DIASTOLIC BLOOD PRESSURE: 88 MMHG | BODY MASS INDEX: 38.09 KG/M2 | HEIGHT: 63 IN | RESPIRATION RATE: 18 BRPM | WEIGHT: 215 LBS

## 2022-02-10 DIAGNOSIS — M54.50 CHRONIC MIDLINE LOW BACK PAIN WITHOUT SCIATICA: ICD-10-CM

## 2022-02-10 DIAGNOSIS — F33.9 MAJOR DEPRESSION, RECURRENT, CHRONIC (HCC): Primary | ICD-10-CM

## 2022-02-10 DIAGNOSIS — G89.29 CHRONIC MIDLINE LOW BACK PAIN WITHOUT SCIATICA: ICD-10-CM

## 2022-02-10 PROCEDURE — 99214 OFFICE O/P EST MOD 30 MIN: CPT | Performed by: FAMILY MEDICINE

## 2022-02-10 RX ORDER — ESCITALOPRAM OXALATE 5 MG/1
5 TABLET ORAL DAILY
Qty: 60 TABLET | Refills: 2 | Status: SHIPPED | OUTPATIENT
Start: 2022-02-10 | End: 2022-07-05 | Stop reason: SINTOL

## 2022-02-10 NOTE — ASSESSMENT & PLAN NOTE
Chronic major depression, previously on Prozac 40mg  - pt irregularly taking prozac and then stopped taking prozac 40 mg > 2 months ago due to no improvement in mood  - same mood, sad most of the days  Feels anxious and nervous  Pt reports issues with boyfriend and not being able to see him as main cause  - lack of appetite for a month  Only eats dinner everyday    - since pt has only been on prozac previously and pt reports mix of sadness and anxiety, prozac d/c and switched to Lexapro 5mg  - reviewed safety of Lexapro in regard to intented pregnancy in the future  - f/u pt in 1 month and will adjust dosing based on symptom improvement or side effects

## 2022-02-10 NOTE — ASSESSMENT & PLAN NOTE
Pt has chronic, positional back pain for duration of months  - pain is described as shooting from lower back to upper back and shoulder  - pain is relieved with ibuprofen and lying on side  - pain is worsened with lifting and bending  - advised to take Tylenol instead of ibuprofen due to pt planning on pregnancy  - advised to schedule for OMT   - discussed importance of proper technique when lifting/bending over

## 2022-02-10 NOTE — PROGRESS NOTES
Assessment/Plan:    Major depression, recurrent, chronic (HCC)  Chronic major depression, previously on Prozac 40mg  - pt irregularly taking prozac and then stopped taking prozac 40 mg > 2 months ago due to no improvement in mood  - same mood, sad most of the days  Feels anxious and nervous  Pt reports issues with boyfriend and not being able to see him as main cause  - lack of appetite for a month  Only eats dinner everyday  - since pt has only been on prozac previously and pt reports mix of sadness and anxiety, prozac d/c and switched to Lexapro 5mg  - reviewed safety of Lexapro in regard to intented pregnancy in the future  - f/u pt in 1 month and will adjust dosing based on symptom improvement or side effects    Chronic midline low back pain without sciatica  Pt has chronic, positional back pain for duration of months  - pain is described as shooting from lower back to upper back and shoulder  - pain is relieved with ibuprofen and lying on side  - pain is worsened with lifting and bending  - advised to take Tylenol instead of ibuprofen due to pt planning on pregnancy  - advised to schedule for OMT   - discussed importance of proper technique when lifting/bending over       Diagnoses and all orders for this visit:    Major depression, recurrent, chronic (HCC)  -     escitalopram (LEXAPRO) 5 mg tablet; Take 1 tablet (5 mg total) by mouth daily  -     Ambulatory Referral to Psychiatry; Future    Body mass index (BMI) 40 0-44 9, adult (HCC)    Chronic midline low back pain without sciatica          Subjective: Pt is well-appearing  Here with   Alert, awake, cooperative, appropriately answers questions  Patient ID: Pilo Madrigal is a 27 y o  female  Pt is here for change in depression medication and low back pain  Pt was previously prescribed Prozac 40mg and was irregularly taking it  Stopped taking Prozac about a month ago for no improvement in mood   Pt reports that mood is the same and that she is sad for most days   describes that pt also has stress-induced anxiety, which will immediately cause pt to have headache and vomiting  Does not feel motivated to do things  Describes that issues with boyfriend is a major factor for feeling sad  Pt also reports staying up very late due to racing thoughts and trouble getting out of bed  Pt also has not been feeling hungry and has only been eating dinner everyday for a couple months  Pt has hobbies of getting her nails done and shopping but does not find them as enjoyable  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   Pt lives with 3 cats which she loves and takes care of  Pt has chronic back pain that has been occurring for couple months  Does not describe any inciting events  Pain is described to be shooting from lower back to upper back and shoulders  Pain is worsened with lying on her back, lifting, and bending down  Pain is relieved with lying on her side and with ibuprofen  Of related note, pt was found to have lipoma from previous visit on her L midback that is also causing her pain  Pt also states that she is not on birth control currently as she is planning on becoming pregnant if issues with boyfriend resolve  Back Pain  Associated symptoms include headaches (describes stress-induced headaches  )  Pertinent negatives include no abdominal pain, chest pain, dysuria, fever or numbness  Medication Refill  Associated symptoms include headaches (describes stress-induced headaches ), myalgias and vomiting (describes pt experiences stress-induced anxiety that causes her vomit)  Pertinent negatives include no abdominal pain, chest pain, fever, nausea, numbness or sore throat  The following portions of the patient's history were reviewed and updated as appropriate:   She  has a past medical history of Altered level of consciousness, Depression, and Intellectual disability    She   Patient Active Problem List Diagnosis Date Noted    Chronic midline low back pain without sciatica 02/10/2022    Acute thoracic myofascial strain 01/14/2022    Anxiety 12/08/2021    Pelvic pain 09/16/2021    Exposure to COVID-19 virus 02/19/2021    Migraine without aura, not intractable 11/20/2020    Diarrhea 11/20/2020    Cervical cancer screening 07/30/2020    Class 3 severe obesity in adult [facility]) 07/30/2020    Trichomoniasis 05/28/2020    Dysuria 05/21/2020    Medicare annual wellness visit, subsequent 10/09/2018    Tension headache 10/09/2018    Encounter for counseling regarding contraception 05/08/2018    Major depression, recurrent, chronic ([facility] [address] ) 02/22/2018    Mild intellectual disability 08/22/2016    GERD (gastroesophageal reflux disease) 09/04/2015    Obesity 07/16/2015    Fetal alcohol syndrome 12/08/2014     She  has a past surgical history that includes No past surgeries  Her family history includes No Known Problems in her father and mother  She was adopted  She  reports that she has never smoked  She has never used smokeless tobacco  She reports current alcohol use  She reports that she does not use drugs  Current Outpatient Medications   Medication Sig Dispense Refill    Diclofenac Sodium (VOLTAREN) 1 % Apply 2 g topically 4 (four) times a day 50 g 1    ibuprofen (MOTRIN) 600 mg tablet Take 1 tablet (600 mg total) by mouth every 6 (six) hours as needed for mild pain 30 tablet 1    ondansetron (ZOFRAN-ODT) 4 mg disintegrating tablet Take 1 tablet (4 mg total) by mouth every 6 (six) hours as needed for nausea or vomiting 20 tablet 0    SUMAtriptan (IMITREX) 25 mg tablet Take 2 tablets (50 mg total) by mouth once as needed for migraine for up to 1 dose 60 tablet 2    escitalopram (LEXAPRO) 5 mg tablet Take 1 tablet (5 mg total) by mouth daily 60 tablet 2    SUMAtriptan (IMITREX) 50 mg tablet        No current facility-administered medications for this visit       Current Outpatient Medications on File Prior to Visit   Medication Sig    Diclofenac Sodium (VOLTAREN) 1 % Apply 2 g topically 4 (four) times a day    ibuprofen (MOTRIN) 600 mg tablet Take 1 tablet (600 mg total) by mouth every 6 (six) hours as needed for mild pain    ondansetron (ZOFRAN-ODT) 4 mg disintegrating tablet Take 1 tablet (4 mg total) by mouth every 6 (six) hours as needed for nausea or vomiting    SUMAtriptan (IMITREX) 25 mg tablet Take 2 tablets (50 mg total) by mouth once as needed for migraine for up to 1 dose    SUMAtriptan (IMITREX) 50 mg tablet     [DISCONTINUED] fluconazole (DIFLUCAN) 150 mg tablet      [DISCONTINUED] FLUoxetine (PROzac) 40 MG capsule Take 1 capsule (40 mg total) by mouth daily (Patient not taking: Reported on 2/10/2022 )     No current facility-administered medications on file prior to visit  She has No Known Allergies       Review of Systems   Constitutional: Positive for appetite change (does not feel hungry  only eats dinner)  Negative for fever and unexpected weight change  HENT: Negative for ear pain and sore throat  Eyes: Negative for pain  Respiratory: Negative for chest tightness and shortness of breath  Cardiovascular: Negative for chest pain and palpitations  Gastrointestinal: Positive for vomiting (describes pt experiences stress-induced anxiety that causes her vomit)  Negative for abdominal pain, constipation, diarrhea and nausea  Genitourinary: Negative for difficulty urinating, dysuria and flank pain  Musculoskeletal: Positive for back pain (Low back pain that shoots to upper back pain and shoulders) and myalgias  Neurological: Positive for headaches (describes stress-induced headaches  )  Negative for dizziness and numbness  Psychiatric/Behavioral: Positive for decreased concentration and sleep disturbance (trouble sleeping and getting out of bed)  Negative for hallucinations, self-injury and suicidal ideas  The patient is nervous/anxious            Objective:      /88 (BP Location: Left arm, Patient Position: Sitting, Cuff Size: Large)   Pulse 90   Resp 18   Ht 5' 2 5" (1 588 m)   Wt 97 5 kg (215 lb)   BMI 38 70 kg/m²          Physical Exam  Constitutional:       General: She is not in acute distress  Appearance: Normal appearance  She is not ill-appearing  HENT:      Head: Normocephalic  Cardiovascular:      Rate and Rhythm: Normal rate and regular rhythm  Pulses: Normal pulses  Heart sounds: Normal heart sounds  No murmur heard  No friction rub  No gallop  Pulmonary:      Effort: Pulmonary effort is normal  No respiratory distress  Breath sounds: Normal breath sounds  No stridor  No wheezing, rhonchi or rales  Chest:      Chest wall: No tenderness  Musculoskeletal:         General: Tenderness (at spinal and paraspinal, at lipoma on L midback) present  No swelling or signs of injury  Skin:     General: Skin is warm and dry  Neurological:      Mental Status: She is alert and oriented to person, place, and time  Psychiatric:         Behavior: Behavior normal          Thought Content:  Thought content normal          Judgment: Judgment normal

## 2022-02-17 ENCOUNTER — TELEPHONE (OUTPATIENT)
Dept: PSYCHIATRY | Facility: CLINIC | Age: 27
End: 2022-02-17

## 2022-02-22 ENCOUNTER — OFFICE VISIT (OUTPATIENT)
Dept: BEHAVIORAL/MENTAL HEALTH CLINIC | Facility: CLINIC | Age: 27
End: 2022-02-22
Payer: COMMERCIAL

## 2022-02-22 DIAGNOSIS — F41.9 ANXIETY: ICD-10-CM

## 2022-02-22 DIAGNOSIS — F33.9 MAJOR DEPRESSION, RECURRENT, CHRONIC (HCC): Primary | ICD-10-CM

## 2022-02-22 PROCEDURE — 90832 PSYTX W PT 30 MINUTES: CPT | Performed by: PSYCHOLOGIST

## 2022-02-22 NOTE — PSYCH
Psychotherapy Provided: Individual Psychotherapy 35 minutes     Length of time in session: 35 minutes, follow up in 2 weeks  Encounter Diagnosis     ICD-10-CM    1  Major depression, recurrent, chronic (HCC)  F33 9    2  Anxiety  F41 9        Goals addressed in session: Goal 1     Current suicide risk : Low     Data:  Psychologist met with Viktor Mortensen  Session was conducted In-person and lasted for 35 minutes  Viktor Mortensen  reported an intensifying symptoms of anxiety and depression  she rated her anxiety and depression as 6 on a scale of 1 (best) to 10 (worst)  Viktor Mortensen processed an increase in [place] of depression symptoms which she described as" an increase in headaches, Nausea, lack of sleeping and lack of eating " Using imaginal exposure she was able to identify as "overthinking if I will see Solmon María " "I feel stressed out because nothing is happening " Psychologist provided psychoeducation regarding sleep hygiene protocol and the importance of eating 3 nutrious meals  Discussed things that Paola Quinn can control vs  Things that she can't "  Specific techniques used in this session were: psychoeducation, mindfulness, problem solving approach, pro/con analysis, assertiveness skills training, empathetic listening, validation, cognitive restructuring, coping skills, role playing and experiential techniques  Assessment: Viktor Mortensen  was oriented to person, place, time and situation  Grooming and Hygiene were good  and clothing was casually dressed and appropriate for weather  Ability to preform ADLs has declined  she demonstrated anxious and apathetic mood and the affect was constricted  Viktor Mortensen made minimal progress toward her treatment goals  she was cooperative  Juanashira Mortensen denied suicidal thoughts, homicidal thoughts and self injurious behaviors  Reviewed her safety plan in case her symptoms exacerbate or thoughts of suicide emerge  Plan: Frances Macario  will return to outpatient therapy on a bi-weekly basis  During this time, Frances Macario will practice coping skills, track mood and practice assertiveness skills training as well as implement the sleep protocol and the eating more healthy  Behavioral Health Treatment Plan [facility]: Diagnosis and Treatment Plan explained to [place] relates understanding diagnosis and is agreeable to Treatment Plan   Yes

## 2022-03-02 ENCOUNTER — OFFICE VISIT (OUTPATIENT)
Dept: FAMILY MEDICINE CLINIC | Facility: CLINIC | Age: 27
End: 2022-03-02

## 2022-03-02 VITALS — WEIGHT: 214.4 LBS | TEMPERATURE: 98.4 F | BODY MASS INDEX: 37.99 KG/M2 | HEIGHT: 63 IN

## 2022-03-02 DIAGNOSIS — G43.009 MIGRAINE WITHOUT AURA AND WITHOUT STATUS MIGRAINOSUS, NOT INTRACTABLE: ICD-10-CM

## 2022-03-02 DIAGNOSIS — M54.50 CHRONIC MIDLINE LOW BACK PAIN WITHOUT SCIATICA: Primary | ICD-10-CM

## 2022-03-02 DIAGNOSIS — S29.019A ACUTE THORACIC MYOFASCIAL STRAIN, INITIAL ENCOUNTER: ICD-10-CM

## 2022-03-02 DIAGNOSIS — M99.00 SOMATIC DYSFUNCTION OF HEAD REGION: ICD-10-CM

## 2022-03-02 DIAGNOSIS — M99.02 SOMATIC DYSFUNCTION OF THORACIC REGION: ICD-10-CM

## 2022-03-02 DIAGNOSIS — M99.01 SOMATIC DYSFUNCTION OF CERVICAL REGION: ICD-10-CM

## 2022-03-02 DIAGNOSIS — G89.29 CHRONIC MIDLINE LOW BACK PAIN WITHOUT SCIATICA: Primary | ICD-10-CM

## 2022-03-02 PROCEDURE — 99213 OFFICE O/P EST LOW 20 MIN: CPT | Performed by: FAMILY MEDICINE

## 2022-03-02 NOTE — PROGRESS NOTES
Assessment/Plan     This is a 27 y o  female who presents for initial OMT for:  1  Chronic midline low back pain without sciatica     2  Migraine without aura and without status migrainosus, not intractable     3  Acute thoracic myofascial strain, initial encounter     4  Somatic dysfunction of head region     5  Somatic dysfunction of cervical region     6  Somatic dysfunction of thoracic region         Plan:   1  Patient tolerated OMT well for the above problems,  advised patient to drink fluids and can use NSAID for soreness after treatment     2  OMT Follow up in 2 weeks     Subjective     Precious Chakraborty is a 27 y o  female and is here for initial OMT  Patient was referred for OMT by Dr Yadira Chamberlain for chronic midline back pain without sciatica  The patient reports for the last few months she has had left upper back and posterior neck pain  She reports associated headaches with her neck pain  She denies any triggering event  She has been trying Ibuprofen if needed for increased pain  She states the pain is worse after prolonged standing and when bending over to lift things  Pain has improved with Ibuprofen but not relieved  She has not been doing any exercises or stretches at home  Is the patient taking Pain medication? Ibuprofen as needed, last done this morning  Has the patient completed physical therapy for this condition? no  Did Patient symptoms improve from last OMT appointment? first visit for OMT    The following portions of the patient's history were reviewed and updated as appropriate:   She  has a past medical history of Altered level of consciousness, Depression, and Intellectual disability    She   Patient Active Problem List    Diagnosis Date Noted    Chronic midline low back pain without sciatica 02/10/2022    Acute thoracic myofascial strain 01/14/2022    Anxiety 12/08/2021    Pelvic pain 09/16/2021    Exposure to COVID-19 virus 02/19/2021    Migraine without aura, not intractable 11/20/2020    Diarrhea 11/20/2020    Cervical cancer screening 07/30/2020    Class 3 severe obesity in adult [facility]) 07/30/2020    Trichomoniasis 05/28/2020    Dysuria 05/21/2020    Medicare annual wellness visit, subsequent 10/09/2018    Tension headache 10/09/2018    Encounter for counseling regarding contraception 05/08/2018    Major depression, recurrent, chronic ([facility] [address] ) 02/22/2018    Mild intellectual disability 08/22/2016    GERD (gastroesophageal reflux disease) 09/04/2015    Obesity 07/16/2015    Fetal alcohol syndrome 12/08/2014     She  has a past surgical history that includes No past surgeries  Current Outpatient Medications   Medication Sig Dispense Refill    Diclofenac Sodium (VOLTAREN) 1 % Apply 2 g topically 4 (four) times a day 50 g 1    escitalopram (LEXAPRO) 5 mg tablet Take 1 tablet (5 mg total) by mouth daily 60 tablet 2    ibuprofen (MOTRIN) 600 mg tablet Take 1 tablet (600 mg total) by mouth every 6 (six) hours as needed for mild pain 30 tablet 1    ondansetron (ZOFRAN-ODT) 4 mg disintegrating tablet Take 1 tablet (4 mg total) by mouth every 6 (six) hours as needed for nausea or vomiting 20 tablet 0    SUMAtriptan (IMITREX) 25 mg tablet Take 2 tablets (50 mg total) by mouth once as needed for migraine for up to 1 dose 60 tablet 2    SUMAtriptan (IMITREX) 50 mg tablet        No current facility-administered medications for this visit       Current Outpatient Medications on File Prior to Visit   Medication Sig    Diclofenac Sodium (VOLTAREN) 1 % Apply 2 g topically 4 (four) times a day    escitalopram (LEXAPRO) 5 mg tablet Take 1 tablet (5 mg total) by mouth daily    ibuprofen (MOTRIN) 600 mg tablet Take 1 tablet (600 mg total) by mouth every 6 (six) hours as needed for mild pain    ondansetron (ZOFRAN-ODT) 4 mg disintegrating tablet Take 1 tablet (4 mg total) by mouth every 6 (six) hours as needed for nausea or vomiting    SUMAtriptan (IMITREX) 25 mg tablet Take 2 tablets (50 mg total) by mouth once as needed for migraine for up to 1 dose    SUMAtriptan (IMITREX) 50 mg tablet      No current facility-administered medications on file prior to visit       Review of Systems  Do you have pain that bothers you in your daily life? yes  Review of Systems   Constitutional: Negative for chills, fatigue and fever  HENT: Negative for congestion and rhinorrhea  Respiratory: Negative for cough and shortness of breath  Gastrointestinal: Negative for abdominal pain  Musculoskeletal: Positive for back pain and neck pain  Negative for gait problem, joint swelling, myalgias and neck stiffness  Skin: Negative for rash and wound  Neurological: Positive for headaches  Negative for dizziness and weakness  Psychiatric/Behavioral: Negative for sleep disturbance         Objective     OMT Exam   Head:   Somatic Dysfunction:  Tissue Texture Changes, Asymmetry  and Restriction  Severity :  1  Osteopathic Findings: OA restriction  Treatment Method: OA release  Response: improved  Cervical:   Somatic Dysfunction:  Tissue Texture Changes, Asymmetry , Restriction and Tenderness  Severity :  2  Osteopathic Findings: Hypertonicity of cervical paraspinal muscles L>R, tenderpoint in Left C3-4  Treatment Method: ME, ST and CS  Response: improved  Thoracic T1-4:   Somatic Dysfunction:  Tissue Texture Changes, Asymmetry , Restriction and Tenderness  Severity :  2  Osteopathic Findings: Hypertonicity of trapezius muscle bilaterally L>R, tenderpoint along spine of scapula  Treatment Method: ME and CS  Response: improved  Thoracic T5-9:   Somatic Dysfunction:  Tissue Texture Changes, Asymmetry , Restriction and Tenderness  Severity :  2  Osteopathic Findings: Hypertoncity of paraspinal muscles L>R, Tenderpoint T7  Treatment Method: HVLA, ME and CS  Response: improved  Thoracic T10-12:  Somatic Dysfunction:  Tissue Texture Changes, Asymmetry  and Restriction  Severity :  1  Osteopathic Findings: Hypertonicity of paraspinal muscles   Treatment Method: HVLA and ST  Response: improved          Joce Recinos, DO PGY-3  Romi Kolb

## 2022-03-08 ENCOUNTER — OFFICE VISIT (OUTPATIENT)
Dept: BEHAVIORAL/MENTAL HEALTH CLINIC | Facility: CLINIC | Age: 27
End: 2022-03-08
Payer: COMMERCIAL

## 2022-03-08 DIAGNOSIS — F33.9 MAJOR DEPRESSION, RECURRENT, CHRONIC (HCC): Primary | ICD-10-CM

## 2022-03-08 DIAGNOSIS — F41.9 ANXIETY: ICD-10-CM

## 2022-03-08 PROCEDURE — 90832 PSYTX W PT 30 MINUTES: CPT | Performed by: PSYCHOLOGIST

## 2022-03-08 NOTE — PSYCH
Psychotherapy Provided: Individual Psychotherapy 23 minutes     Length of time in session: 23 minutes, follow up in 2 weeks  Encounter Diagnosis     ICD-10-CM    1  Major depression, recurrent, chronic (HCC)  F33 9    2  Anxiety  F41 9        Goals addressed in session: Goal 1     Current suicide risk : Low     Data:  Psychologist met with Zuleyma Gorman  Session was conducted In-person and lasted for 23 minutes  Zuleyma Gorman  reported ongoing clinical symptoms of anxiety and depression  she rated her anxiety and depression as 6 on a scale of 1 (best) to 10 (worst)  Zuleyma Gorman processed the possibility of moving to another county to live with her boyfriend  Completed a pro/con analysis  Encouraged Sherry to focus on self as opposed to be susceptible to the influence of others  Specific techniques used in this session were: psychoeducation, problem solving approach, pro/con analysis, assertiveness skills training, empathetic listening, validation, cognitive restructuring, emotional regulation skills, coping skills, role playing and experiential techniques  Assessment: Zuleyma Gorman  was oriented to person, place, time and situation  Grooming and Hygiene were fair  and clothing was casually dressed and appropriate for weather  Ability to preform ADLs has not changed  she demonstrated apathetic mood and the affect was congruent  [place] made minimal progress toward her treatment goals  she was cooperative  Zuleyma Gorman denied suicidal thoughts, homicidal thoughts and self injurious behaviors  Plan: [place]  will return to outpatient therapy on a bi-weekly basis  During this time, Zuleyma Gorman will practice coping skills and implement the action plan          Behavioral Health Treatment Plan [facility]: Diagnosis and Treatment Plan explained to Jenny Ruiz relates understanding diagnosis and is agreeable to Treatment Plan  Yes

## 2022-03-17 ENCOUNTER — OFFICE VISIT (OUTPATIENT)
Dept: FAMILY MEDICINE CLINIC | Facility: CLINIC | Age: 27
End: 2022-03-17

## 2022-03-17 VITALS
WEIGHT: 215.6 LBS | RESPIRATION RATE: 18 BRPM | HEIGHT: 62 IN | SYSTOLIC BLOOD PRESSURE: 118 MMHG | TEMPERATURE: 98 F | HEART RATE: 70 BPM | BODY MASS INDEX: 39.67 KG/M2 | DIASTOLIC BLOOD PRESSURE: 84 MMHG

## 2022-03-17 DIAGNOSIS — F33.9 MAJOR DEPRESSION, RECURRENT, CHRONIC (HCC): ICD-10-CM

## 2022-03-17 DIAGNOSIS — M54.50 CHRONIC MIDLINE LOW BACK PAIN WITHOUT SCIATICA: Primary | ICD-10-CM

## 2022-03-17 DIAGNOSIS — F41.9 ANXIETY: ICD-10-CM

## 2022-03-17 DIAGNOSIS — G89.29 CHRONIC MIDLINE LOW BACK PAIN WITHOUT SCIATICA: Primary | ICD-10-CM

## 2022-03-17 PROCEDURE — 99214 OFFICE O/P EST MOD 30 MIN: CPT | Performed by: FAMILY MEDICINE

## 2022-03-17 NOTE — PROGRESS NOTES
Assessment/Plan:    Major depression, recurrent, chronic ([address] )  Moderately controlled  Reports mild alleviation of symptoms with Lexapro 5mg for the past one month and would like to continue with similar dose  - Follow-up in 4 weeks for medication management   - Advised to get exercise and spend time outside with the nice weather  - Recommended to explore [employer] workout for back pain and overall exercise while waiting for gym membership  Chronic midline low back pain without sciatica  Moderately controlled  Patient received her first OMT treatment last week with mild improvement of symptoms although she had worsening of symptoms right after the appointment the next day  Next appointment is 3/22/22  - Advised to take tylenol or ibuprofen PRN     Migraine without aura, not intractable  Moderately controlled  Reports having 1/week migraines that are only alleviated by laying down in a dark room  Currently takes sumatriptan 50mg daily    - Advised to stay hydrated   - Continue current medication regimen       Diagnoses and all orders for this visit:    Chronic midline low back pain without sciatica    Major depression, recurrent, chronic (HCC)    Anxiety          Subjective:      Patient ID: Ana Thomas is a 27 y o  female  Patient presents for a 1 month follow-up for depression and chronic low back pain  She has been taking Lexapro 5mg which she states has been helping "a little bit " She states she's been having diarrhea for the past couple of weeks and will have a bowel movement 3 times a day with watery stool, no blood  She has been feeling more tired recently and goes to bed around the same time she normally used to sleep but now cannot wake up until 12 or 1pm  Would like to defer increasing dose of Lexapro for now     Last week, she had OMT for her chronic back pain and reports some relief but had an episode where she couldn't walk for a few days and had to take Ibuprofen to help   She reports diffuse pain down her spine and in her shoulders  Her work also involves lifting heavy weights  She has an OMT appointment scheduled for next Tuesday 3/22/22  She still reports having migraines once a week which has been a chronic issue  Currently on sumatriptan 50mg and uses a hot compress for symptomatic relief  She is looking into a gym but has had a hard time with her insurance  She works 9am-2pm on Monday and Friday  The following portions of the patient's history were reviewed and updated as appropriate: allergies, current medications, past family history, past medical history, past social history, past surgical history and problem list     Review of Systems   Constitutional: Negative for appetite change and unexpected weight change  HENT: Negative for congestion, sinus pressure and sore throat  Eyes: Negative for visual disturbance  Respiratory: Negative for chest tightness and shortness of breath  Cardiovascular: Negative for chest pain  Gastrointestinal: Negative for abdominal pain  Genitourinary: Negative for dyspareunia and urgency  Musculoskeletal: Negative for arthralgias  Skin: Negative for color change and pallor  Neurological: Positive for headaches (consistent with chronic migraines)  Negative for dizziness and weakness  Psychiatric/Behavioral: Negative for agitation, behavioral problems, decreased concentration and suicidal ideas  Objective:      /84 (BP Location: Left arm, Patient Position: Sitting, Cuff Size: Large)   Pulse 70   Temp 98 °F (36 7 °C) (Temporal)   Resp 18   Ht 5' 2" (1 575 m)   Wt 97 8 kg (215 lb 9 6 oz)   BMI 39 43 kg/m²          Physical Exam  Constitutional:       Appearance: Normal appearance  She is obese  HENT:      Head: Normocephalic and atraumatic  Eyes:      Extraocular Movements: Extraocular movements intact  Cardiovascular:      Rate and Rhythm: Normal rate and regular rhythm        Pulses: Normal pulses  Heart sounds: Normal heart sounds  Pulmonary:      Effort: Pulmonary effort is normal       Breath sounds: Normal breath sounds  Abdominal:      General: Abdomen is flat  Palpations: Abdomen is soft  Musculoskeletal:         General: Tenderness (spinous processes diffusely from cervical to lumbar region) present  Skin:     General: Skin is warm and dry  Neurological:      Mental Status: She is alert and oriented to person, place, and time  Psychiatric:         Mood and Affect: Mood normal          Behavior: Behavior normal          Thought Content:  Thought content normal          Judgment: Judgment normal

## 2022-03-17 NOTE — ASSESSMENT & PLAN NOTE
Moderately controlled  Reports mild alleviation of symptoms with Lexapro 5mg for the past one month and would like to continue with similar dose  - Follow-up in 4 weeks for medication management   - Advised to get exercise and spend time outside with the nice weather  - Recommended to explore [employer] workout for back pain and overall exercise while waiting for gym membership

## 2022-03-17 NOTE — ASSESSMENT & PLAN NOTE
Moderately controlled  Patient received her first OMT treatment last week with mild improvement of symptoms although she had worsening of symptoms right after the appointment the next day   Next appointment is 3/22/22  - Advised to take tylenol or ibuprofen PRN

## 2022-03-17 NOTE — ASSESSMENT & PLAN NOTE
Moderately controlled  Reports having 1/week migraines that are only alleviated by laying down in a dark room   Currently takes sumatriptan 50mg daily    - Advised to stay hydrated   - Continue current medication regimen

## 2022-03-22 ENCOUNTER — OFFICE VISIT (OUTPATIENT)
Dept: FAMILY MEDICINE CLINIC | Facility: CLINIC | Age: 27
End: 2022-03-22

## 2022-03-22 DIAGNOSIS — M25.512 LEFT SHOULDER PAIN, UNSPECIFIED CHRONICITY: Primary | ICD-10-CM

## 2022-03-22 DIAGNOSIS — M99.02 SOMATIC DYSFUNCTION OF THORACIC REGION: ICD-10-CM

## 2022-03-22 DIAGNOSIS — M99.03 SOMATIC DYSFUNCTION OF SPINE, LUMBAR: ICD-10-CM

## 2022-03-22 PROCEDURE — 99213 OFFICE O/P EST LOW 20 MIN: CPT | Performed by: FAMILY MEDICINE

## 2022-03-22 NOTE — PROGRESS NOTES
Assessment/Plan     This is a 27 y o  female who presents for OMT follow-up for:  1  Left shoulder pain, unspecified chronicity  Ambulatory Referral to Physical Therapy   2  Somatic dysfunction of thoracic region     3  Somatic dysfunction of spine, lumbar         Plan:   1  Patient tolerated OMT well for the above problems,  advised patient to drink fluids and can use NSAID for soreness after treatment   - Long discussion about proper lifting techniques (squat down and use legs) to limit stress/pressure on back, better posture and stretching daily  - Recommend pt avoid sleeping on L shoulder at this time to decrease L shoulder pain  - can continue to use voltaren gel, ice/heat, and ibuprofen prn for pain control (advise to take with food to limit GI upset)  - refer to PT for L shoulder    2  OMT Follow up in 2 weeks  Subjective     Ren Correia is a 27 y o  female and is here for a OMT follow up  The patient reports continued back pain and L shoulder pain that began about 2 weeks ago  She was here for OMT with Dr Amalia Lopez on 3/02 with minimal relief  She states that back pain is about the same as last time  She is using Voltaren gel with minimal relief and Ibuprofen 600 mg x 1 daily, usually in AM  Also using heating pad and icy hot gel - these two both help  Lifting heavy things make back and shoulder pain worse  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 yes ibuprofen   Has the patient completed physical therapy for this condition?  no  Did Patient symptoms improve from last OMT appointment? no, it's about the same as before    The following portions of the patient's history were reviewed and updated as appropriate: allergies, current medications, past family history, past social history and past surgical history  Review of Systems  Do you have pain that bothers you in your daily life? yes  Review of Systems   Constitutional: Negative for chills and fever  HENT: Negative for congestion and rhinorrhea  Eyes: Negative for visual disturbance  Respiratory: Negative for cough and shortness of breath  Cardiovascular: Negative for chest pain and palpitations  Gastrointestinal: Negative for abdominal pain, constipation, diarrhea, nausea and vomiting  Genitourinary: Negative for dysuria  Musculoskeletal: Positive for back pain and myalgias  L shoulder pain    Skin: Negative for rash  Neurological: Negative for dizziness, light-headedness and headaches           Objective     OMT Exam   OMT  Performed by: [employer], DO  Authorized by: CRAiLAR, DO     Verbal consent obtained?: Yes    Consent given by:  Patient  Patient states understanding of procedure being performed: Yes    Patient's understanding of procedure matches consent: Yes    Procedure consent matches procedure scheduled: Yes    Patient identity confirmed:  Verbally with patient  Procedure Details:     Region evaluated and treated:  Thoracic T1 - T4, Thoracic T5 - T9, Thoracic T10 - T12, Lumbar and Left Upper Extremity    Thoracic T1 - T4 details:     Examination Method:  Tissue Texture Change, Stability, Laxity, Effusions, Tone and Tenderness, Pain    Severity:  Mild    Osteopathic Findings:  Hypertonicity of paraspinal musculature    Treatment Method:  High Velocity, Low Amplitude Treatment, Muscle Energy Treatment and Soft Tissue Treatment    Response:  Improved    Thoracic T5 - T9 details:     Examination Method:  Tissue Texture Change, Stability, Laxity, Effusions, Tone and Tenderness, Pain    Severity:  Mild    Osteopathic Findings:  Hypertonicity of musculature  T7-9 F RRSR    Treatment Method:  Soft Tissue Treatment, Muscle Energy Treatment, Myofascial Release Treatment, High Velocity, Low Amplitude Treatment and Direct Treatment    Response:  Improved - The somatic dysfunction is improved but not completely resolved  Thoracic T10 - T12 details:     Examination Method:  Tissue Texture Change, Stability, Laxity, Effusions, Tone, Range of Motion, Contracture and Tenderness, Pain    Severity:  Mild    Osteopathic Findings:  Hypertonicity of musculature    Treatment Method:  Direct Treatment, Muscle Energy Treatment, High Velocity, Low Amplitude Treatment and Soft Tissue Treatment    Response:  Improved - The somatic dysfunction is improved but not completely resolved  Lumbar details:     Examination Method:  Tissue Texture Change, Stability, Laxity, Effusions, Tone, Range of Motion, Contracture and Tenderness, Pain    Severity:  Mild    Osteopathic Findings:  Hypertonicity of lumbar musculature, L > R    Treatment Method:  Soft Tissue Treatment and Indirect Treatment    Response:  Unchanged - The somatic dysfunction is unchanged or the same after treatment  Left Upper Extremity details:     Examination Method:  Tissue Texture Change, Stability, Laxity, Effusions, Tone, Tenderness, Pain and Range of Motion, Contracture    Severity:  Moderate    Osteopathic Findings:  Decreased ROM (more in extension) - complete Dequan's technique  Deltoid tenderpoint      Treatment Method:  Counterstrain Treatment, Muscle Energy Treatment and Soft Tissue Treatment    Response:  Unchanged - The somatic dysfunction is unchanged or the same after treatment  Total Regions Treated:  5  Attending provider present in exam room for procedure:  No

## 2022-03-23 DIAGNOSIS — F70 MILD INTELLECTUAL DISABILITY: Primary | ICD-10-CM

## 2022-03-23 DIAGNOSIS — F33.9 MAJOR DEPRESSION, RECURRENT, CHRONIC (HCC): ICD-10-CM

## 2022-04-05 ENCOUNTER — OFFICE VISIT (OUTPATIENT)
Dept: BEHAVIORAL/MENTAL HEALTH CLINIC | Facility: CLINIC | Age: 27
End: 2022-04-05
Payer: COMMERCIAL

## 2022-04-05 DIAGNOSIS — F41.9 ANXIETY: ICD-10-CM

## 2022-04-05 DIAGNOSIS — F33.9 MAJOR DEPRESSION, RECURRENT, CHRONIC (HCC): Primary | ICD-10-CM

## 2022-04-05 PROCEDURE — 90832 PSYTX W PT 30 MINUTES: CPT | Performed by: PSYCHOLOGIST

## 2022-04-05 NOTE — PSYCH
Psychotherapy Provided: Individual Psychotherapy 35 minutes     Length of time in session: 35 minutes, follow up in 1 week  Encounter Diagnosis     ICD-10-CM    1  Major depression, recurrent, chronic (HCC)  F33 9    2  Anxiety  F41 9        Goals addressed in session: Goal 1     Current suicide risk : Low     Data:  Psychologist met with Aura Lambert  Session was conducted In-person and lasted for 35 minutes  Aura Lambert  reported an increase in psychosocial stressors which in turn exacerbated her clinical symptoms  she rated their anxiety and depression as 9 on a scale of 1 (best) to 10 (worst)  Aura Lambert noted that prior to this appointment "I had a verbal altercation with Shad Driscoll ()  " using imaginal exposure, Elissa Williamson was able to identify the stressor as not feeling supported and validated with her desire to have the no contact order lifted  Psychologist attempted to have Elissa Williamson view the situation from a different perspectives, however, she demonstrated rigidity and difficulty with doing so  Psychologist asked Elissa Williamson if she would like Shad Driscoll to join the session, however, she declined noting  "no i'm still agrevated and mad at her " Psychologist helped [employer] in session as well as practice assertiveness communication  In session explored different options that are available to her and based on that she was able to make her action plan  Elissa Williamson noted that she stopped taking her psychotropic medication, provider encouraged Alberto to contact PCP  Specific techniques used in this session were: psychoeducation, problem solving approach, pro/con analysis, assertiveness skills training, empathetic listening, validation, cognitive restructuring, emotional regulation skills, motivational interviewing, distress tolerance, relaxation techniques, coping skills, role playing and experiential techniques       Assessment: Aura Lambert was oriented to person, place, time and situation  Grooming and Hygiene were good  and clothing was casually dressed and appropriate for weather  Ability to preform ADLs has declined  she demonstrated sad and irritable mood and the affect was congruent  Marilyn Epperson made minimal progress toward her treatment goals  she was cooperative  Marilyn Epperson denied suicidal thoughts, homicidal thoughts and self injurious behaviors  She did note that if SI emerge, she would go to the nearest ER for an evaluation  Reviewed Sherry's safety plan in session and she was agreeable to implement it  Plan: Due to excerebration of clinical symptom, Marilyn Epperson  will return to outpatient therapy on a weekly basis  During this time, Marilyn Epperson will practice coping skills and practice assertiveness skills training  Behavioral Health Treatment Plan [facility]: Diagnosis and Treatment Plan explained to Lex Carmen relates understanding diagnosis and is agreeable to Treatment Plan   Yes

## 2022-04-06 ENCOUNTER — OFFICE VISIT (OUTPATIENT)
Dept: FAMILY MEDICINE CLINIC | Facility: CLINIC | Age: 27
End: 2022-04-06

## 2022-04-06 VITALS
BODY MASS INDEX: 39.87 KG/M2 | WEIGHT: 218 LBS | OXYGEN SATURATION: 100 % | SYSTOLIC BLOOD PRESSURE: 110 MMHG | RESPIRATION RATE: 18 BRPM | HEART RATE: 86 BPM | DIASTOLIC BLOOD PRESSURE: 76 MMHG

## 2022-04-06 DIAGNOSIS — G89.29 CHRONIC MIDLINE LOW BACK PAIN WITHOUT SCIATICA: Primary | ICD-10-CM

## 2022-04-06 DIAGNOSIS — S29.019A ACUTE THORACIC MYOFASCIAL STRAIN, INITIAL ENCOUNTER: ICD-10-CM

## 2022-04-06 DIAGNOSIS — M54.50 CHRONIC MIDLINE LOW BACK PAIN WITHOUT SCIATICA: Primary | ICD-10-CM

## 2022-04-06 PROCEDURE — 99213 OFFICE O/P EST LOW 20 MIN: CPT | Performed by: FAMILY MEDICINE

## 2022-04-06 NOTE — ASSESSMENT & PLAN NOTE
Patient had OMT today  Responded well to HVLA to thoracic spine, pain resolved    - f/u 2 weeks  - continue adequate hydration/tylenol for return of pain or next day soreness

## 2022-04-06 NOTE — PROGRESS NOTES
The Assessment/Plan     Acute thoracic myofascial strain  Patient had OMT today  Responded well to HVLA to thoracic spine, pain resolved  - f/u 2 weeks  - continue adequate hydration/tylenol for return of pain or next day soreness    Chronic midline low back pain without sciatica  Patient had OMT today  Responded well to HVLA/ME/FPR/CS/ST techniques to lumbar spine, pain improved  - f/u 2 weeks  - continue adequate hydration/tylenol for return of pain or next day soreness       Problem List Items Addressed This Visit        Musculoskeletal and Integument    Acute thoracic myofascial strain     Patient had OMT today  Responded well to HVLA to thoracic spine, pain resolved  - f/u 2 weeks  - continue adequate hydration/tylenol for return of pain or next day soreness            Other    Chronic midline low back pain without sciatica - Primary     Patient had OMT today  Responded well to HVLA/ME/FPR/CS/ST techniques to lumbar spine, pain improved  - f/u 2 weeks  - continue adequate hydration/tylenol for return of pain or next day soreness                This is a 27 y o  female who presents for OMT follow-up  1  Patient tolerated OMT well for the above problems,  advised patient to drink fluids and can use NSAID for soreness after treatment     2  OMT Follow up in 2 weeks  Subjective     Krishna Richmond is a 27 y o  female and is here for a OMT follow up  The patient reports mid thoracic back pain and chronic lumbar low back pain  She also states her work requires her to lift heavy objects overhead and has had shoulder pain bilaterally  Is the patient taking Pain medication? no  Has the patient completed physical therapy for this condition? no  Did Patient symptoms improve from last OMT appointment?  yes    The following portions of the patient's history were reviewed and updated as appropriate: allergies, current medications, past family history, past medical history, past social history, past surgical history and problem list     Review of Systems  Review of Systems   Constitutional: Negative for chills and fever  HENT: Negative for ear pain and sore throat  Eyes: Negative for pain and visual disturbance  Respiratory: Negative for cough and shortness of breath  Cardiovascular: Negative for chest pain and palpitations  Gastrointestinal: Negative for abdominal pain and vomiting  Genitourinary: Negative for dysuria and hematuria  Musculoskeletal: Positive for back pain and neck pain  Negative for arthralgias  Skin: Negative for color change and rash  Neurological: Negative for seizures and syncope  All other systems reviewed and are negative          Objective     OMT Exam   Cervical:   Somatic Dysfunction:  Asymmetry  and Restriction  Severity :  1  Osteopathic Findings: L>R cervical paraspinal musculature hypertoncity  Treatment Method: ME  Response: improved  Thoracic T5-9:   Somatic Dysfunction:  Asymmetry  and Restriction  Severity :  1  Osteopathic Findings: T6 F RR,SR  Treatment Method: HVLA  Response: resolved  Lumbar:  Somatic Dysfunction:  Tissue Texture Changes, Asymmetry , Restriction and Tenderness  Severity :  2  Osteopathic Findings: tenderness of bilateral musculature (paraspinal)  Treatment Method: HVLA, ME, ST, MFR, CS and FPR  Response: improved     Rm Gomes DO  Family Medicine Resident, PGY2  2:07 PM

## 2022-04-06 NOTE — ASSESSMENT & PLAN NOTE
Patient had OMT today  Responded well to HVLA/ME/FPR/CS/ST techniques to lumbar spine, pain improved    - f/u 2 weeks  - continue adequate hydration/tylenol for return of pain or next day soreness

## 2022-04-07 ENCOUNTER — TELEPHONE (OUTPATIENT)
Dept: BEHAVIORAL/MENTAL HEALTH CLINIC | Facility: CLINIC | Age: 27
End: 2022-04-07

## 2022-04-07 NOTE — TELEPHONE ENCOUNTER
Returned Luis Armando and discussed Sherry's case  She noted that she went to the  office and Марина Ornelas needs to fill a motion to initiate as a way to remove the contact order

## 2022-04-12 ENCOUNTER — OFFICE VISIT (OUTPATIENT)
Dept: BEHAVIORAL/MENTAL HEALTH CLINIC | Facility: CLINIC | Age: 27
End: 2022-04-12
Payer: COMMERCIAL

## 2022-04-12 DIAGNOSIS — F41.9 ANXIETY: ICD-10-CM

## 2022-04-12 DIAGNOSIS — F33.9 MAJOR DEPRESSION, RECURRENT, CHRONIC (HCC): Primary | ICD-10-CM

## 2022-04-12 PROCEDURE — 90834 PSYTX W PT 45 MINUTES: CPT | Performed by: PSYCHOLOGIST

## 2022-04-12 NOTE — PSYCH
Psychotherapy Provided: Individual Psychotherapy 43 minutes     Length of time in session: 43 minutes, follow up in 2 weeks  Encounter Diagnosis     ICD-10-CM    1  Major depression, recurrent, chronic (HCC)  F33 9    2  Anxiety  F41 9        Goals addressed in session: Goal 1     Current suicide risk : Low     Data:  Psychologist met with Nan Bullard  Session was conducted In-person and lasted for 43 minutes  Nan Bullard  reported ongoing clinical symptoms as she rated her anxiety and depression as 7 on a scale of 1 (best) to 10 (worst)  Nan Bullard processed her desire to write a letter to have the no contact order with her boyfriend dismissed  Completed a pro/con analysis and overall, William Villanueva was adamant in writing the letter  In session, Psychologist helped William Villanueva write the letter  She noted the letter as "short, simple and  To the point just like I want " William Villanueva was also able to process other stressors which include her sister currently living with her  In session, William Villanueva was able to practice setting boundaries via role playing  Specific techniques used in this session were: psychoeducation, problem solving approach, pro/con analysis, assertiveness skills training, empathetic listening, validation, cognitive restructuring, emotional regulation skills, role playing and experiential techniques  Assessment: Nan Bullard  was oriented to person, place, time and situation  Grooming and Hygiene were good  and clothing was casually dressed and appropriate for weather  Ability to preform ADLs has improved  she demonstrated content mood and the affect was congruent  Nan Bullard made minimal progress toward her treatment goals  she was cooperative  Nangerda Bullard denied suicidal thoughts, homicidal thoughts and self injurious behaviors  Plan: Nan Bullard  will return to outpatient therapy on a bi-weekly basis   During this time, Luis Manuel Veras will practice coping skills and practice assertiveness skills training  Behavioral Health Treatment Plan [facility]: Diagnosis and Treatment Plan explained to Xiomara Balderas relates understanding diagnosis and is agreeable to Treatment Plan   Yes

## 2022-04-26 ENCOUNTER — TELEPHONE (OUTPATIENT)
Dept: PSYCHIATRY | Facility: CLINIC | Age: 27
End: 2022-04-26

## 2022-04-26 NOTE — TELEPHONE ENCOUNTER
Patient left voicemail on  line asking to cancel appt on 4/27/2022  No further information was provided   writer returned pt call and left voicemail informing patient that appt was cancelled and asked patient to call the office back to reschedule

## 2022-05-03 ENCOUNTER — OFFICE VISIT (OUTPATIENT)
Dept: FAMILY MEDICINE CLINIC | Facility: CLINIC | Age: 27
End: 2022-05-03

## 2022-05-03 DIAGNOSIS — M54.50 CHRONIC MIDLINE LOW BACK PAIN WITHOUT SCIATICA: Primary | ICD-10-CM

## 2022-05-03 DIAGNOSIS — M99.01 SOMATIC DYSFUNCTION OF CERVICAL REGION: ICD-10-CM

## 2022-05-03 DIAGNOSIS — M99.05 SOMATIC DYSFUNCTION OF PELVIS REGION: ICD-10-CM

## 2022-05-03 DIAGNOSIS — M99.00 SOMATIC DYSFUNCTION OF HEAD REGION: ICD-10-CM

## 2022-05-03 DIAGNOSIS — M99.02 SOMATIC DYSFUNCTION OF THORACIC REGION: ICD-10-CM

## 2022-05-03 DIAGNOSIS — G89.29 CHRONIC MIDLINE LOW BACK PAIN WITHOUT SCIATICA: Primary | ICD-10-CM

## 2022-05-03 PROCEDURE — 99213 OFFICE O/P EST LOW 20 MIN: CPT | Performed by: FAMILY MEDICINE

## 2022-05-03 NOTE — ASSESSMENT & PLAN NOTE
Patient had OMT today    Exhibited tenderness to palpation on PSIS bilaterally Responded well to HVLA/ME/FPR/CS/ST techniques to lumbar spine  - f/u 2 weeks  - continue adequate hydration/tylenol for return of pain and to also apply Voltaren gel QID PRN to area

## 2022-05-03 NOTE — PROGRESS NOTES
Assessment/Plan     This is a 27 y o  female who presents for OMT follow-up for:  1  Chronic midline low back pain without sciatica     2  Somatic dysfunction of head region     3  Somatic dysfunction of cervical region     4  Somatic dysfunction of thoracic region     5  Somatic dysfunction of pelvis region       Chronic midline low back pain without sciatica  Patient had OMT today which she tolerated well but was very tender to palpation around the PSIS areas bilaterally   Responded well to HVLA/ME/FPR/CS/ST techniques to lumbar spine  - f/u 2 weeks  - continue adequate hydration/tylenol for return of pain and to also apply Voltaren gel QID PRN to area         Plan:   1  Patient tolerated OMT well for the above problems,  advised patient to drink fluids and can use NSAID for soreness after treatment     2  OMT Follow up in 2 weeks  Subjective     Sophie Ramírez is a 27 y o  female and is here for a OMT follow up  The patient reports her upper back is better after the last session however the area that is her biggest concern is her lower back  No recent falls or injuries  For her upper back she has been doing the stretches taught to her by previous providers but has not been doing stretches for her lower back  Pain does not radiation, dull in sensation that worsens with bending  No red flags such as urinary or bowel incontinence or saddle anesthesia  Is the patient taking Pain medication? yes  Has the patient completed physical therapy for this condition? no  Did Patient symptoms improve from last OMT appointment?  yes    The following portions of the patient's history were reviewed and updated as appropriate: allergies, current medications, past family history, past medical history, past social history, past surgical history and problem list     Review of Systems  Do you have pain that bothers you in your daily life? yes  Review of Systems   Musculoskeletal: Positive for back pain (left upper back and bilateral lumbar area) and neck pain  Negative for arthralgias, gait problem and neck stiffness  Neurological: Negative for dizziness, weakness and headaches         Objective     OMT Exam   Head:   Somatic Dysfunction:  Tissue Texture Changes, Asymmetry  and Restriction  Severity :  1  Osteopathic Findings: OA restriction  Treatment Method: OA release  Response: improved  Cervical:   Somatic Dysfunction:  Tissue Texture Changes, Asymmetry , Restriction and Tenderness  Severity :  2  Osteopathic Findings: Hypertonicity of cervical paraspinal muscles, tenderpoint in Left C3-4  Treatment Method: ME, ST and CS  Response: improved  Thoracic T1-4:   Somatic Dysfunction:  Tissue Texture Changes, Asymmetry , Restriction and Tenderness  Severity :  2  Osteopathic Findings: Hypertonicity of trapezius muscle bilaterally L>R, tenderpoint along spine of scapula  Treatment Method: ME and CS  Response: improved  Thoracic T5-9:   Somatic Dysfunction:  Tissue Texture Changes, Asymmetry , Restriction and Tenderness  Severity :  2  Osteopathic Findings: Hypertoncity of paraspinal muscles L>R, Tenderpoint T7  Treatment Method: HVLA, ME and CS  Response: improved  Thoracic T10-12:  Somatic Dysfunction:  Tissue Texture Changes, Asymmetry  and Restriction  Severity :  1  Osteopathic Findings: Hypertonicity of paraspinal muscles   Treatment Method: HVLA and ST  Response: improved  Lumbar:  Somatic Dysfunction:  Tissue Texture Changes, Asymmetry  and Restriction  Severity :  3  Osteopathic Findings: Hypertonicity of paraspinal muscles, tenderpoint at PSIS bilaterally  Treatment Method: HVLA and ST, and MFR, CS  Response: improved          Teo Simmons DO PGY-3  [place] [id number]

## 2022-05-17 ENCOUNTER — OFFICE VISIT (OUTPATIENT)
Dept: FAMILY MEDICINE CLINIC | Facility: CLINIC | Age: 27
End: 2022-05-17

## 2022-05-17 VITALS — TEMPERATURE: 98 F | WEIGHT: 214 LBS | BODY MASS INDEX: 39.38 KG/M2 | HEIGHT: 62 IN

## 2022-05-17 DIAGNOSIS — S29.019D ACUTE THORACIC MYOFASCIAL STRAIN, SUBSEQUENT ENCOUNTER: ICD-10-CM

## 2022-05-17 DIAGNOSIS — R10.2 PELVIC PAIN: ICD-10-CM

## 2022-05-17 DIAGNOSIS — M54.50 CHRONIC MIDLINE LOW BACK PAIN WITHOUT SCIATICA: Primary | ICD-10-CM

## 2022-05-17 DIAGNOSIS — G89.29 CHRONIC MIDLINE LOW BACK PAIN WITHOUT SCIATICA: Primary | ICD-10-CM

## 2022-05-17 PROCEDURE — 99213 OFFICE O/P EST LOW 20 MIN: CPT | Performed by: FAMILY MEDICINE

## 2022-05-17 NOTE — PROGRESS NOTES
Assessment/Plan     This is a 27 y o  female who presents for OMT follow-up for:  1  Chronic midline low back pain without sciatica     2  Acute thoracic myofascial strain, subsequent encounter     3  Pelvic pain         Plan:   1  Patient tolerated OMT well for the above problems,  advised patient to drink fluids and can use NSAID for soreness after treatment     2  OMT Follow up in 2 weeks  Subjective     Aura Lambert is a 27 y o  female and is here for a OMT follow up  The patient reports that her main concern is low back pain however, pt also c/o mid thoracic and pelvic pain  Patient does not take medications at home  She lives a sedientary lifestyle  Patient does some lower back stretches in the morning when she wakes up  Is the patient taking Pain medication? no  Did Patient symptoms improve from last OMT appointment?  yes      Review of Systems  Do you have pain that bothers you in your daily life? yes  Review of Systems    Objective     OMT Exam   Thoracic T5-9:   Somatic Dysfunction:  Tissue Texture Changes, Asymmetry , Restriction and Tenderness  Severity :  1  Osteopathic Findings: hypertonicity on the right  Treatment Method: HVLA and MFR  Response: improved  Thoracic T10-12:  Somatic Dysfunction:  Tissue Texture Changes, Asymmetry , Restriction and Tenderness  Severity :  1  Osteopathic Findings: hypertonicity on the right  Treatment Method: HVLA and MFR  Response: improved  Lumbar:  Somatic Dysfunction:  Tissue Texture Changes, Asymmetry , Restriction and Tenderness  Severity :  3  Osteopathic Findings: multiple tenderpoints b/l  Treatment Method: ME, MFR and CS  Response: improved  Pelvis:  Severity :  2  Osteopathic Findings: R posterior rotated pelvis  Treatment Method: ME  Response: improved          Aj Lemus DO PGY-1  [facility]

## 2022-05-25 ENCOUNTER — TELEMEDICINE (OUTPATIENT)
Dept: FAMILY MEDICINE CLINIC | Facility: CLINIC | Age: 27
End: 2022-05-25

## 2022-05-25 DIAGNOSIS — J02.9 SORE THROAT: Primary | ICD-10-CM

## 2022-05-25 PROCEDURE — 87070 CULTURE OTHR SPECIMN AEROBIC: CPT

## 2022-05-25 PROCEDURE — U0005 INFEC AGEN DETEC AMPLI PROBE: HCPCS

## 2022-05-25 PROCEDURE — 99213 OFFICE O/P EST LOW 20 MIN: CPT | Performed by: FAMILY MEDICINE

## 2022-05-25 PROCEDURE — U0003 INFECTIOUS AGENT DETECTION BY NUCLEIC ACID (DNA OR RNA); SEVERE ACUTE RESPIRATORY SYNDROME CORONAVIRUS 2 (SARS-COV-2) (CORONAVIRUS DISEASE [COVID-19]), AMPLIFIED PROBE TECHNIQUE, MAKING USE OF HIGH THROUGHPUT TECHNOLOGIES AS DESCRIBED BY CMS-2020-01-R: HCPCS

## 2022-05-25 RX ORDER — FLUTICASONE PROPIONATE 50 MCG
1 SPRAY, SUSPENSION (ML) NASAL DAILY
Qty: 11.1 ML | Refills: 1 | Status: SHIPPED | OUTPATIENT
Start: 2022-05-25

## 2022-05-25 RX ORDER — FLUTICASONE PROPIONATE 50 MCG
1 SPRAY, SUSPENSION (ML) NASAL DAILY
Qty: 11.1 ML | Refills: 1 | Status: SHIPPED | OUTPATIENT
Start: 2022-05-25 | End: 2022-05-25

## 2022-05-25 NOTE — PROGRESS NOTES
Virtual Brief Visit    Patient is located in the following state in which I hold an active license [place]      Assessment/Plan:    Problem List Items Addressed This Visit        Other    Sore throat - Primary     - Sore throat started more than 7 days ago   - Reported swollen of her tonsil  - Has mild dysphagia with solid   - Minimal dry cough noticed, afebrile, chill  - Felt her anterior cervical region "a bit swollen"  - Pt had history of Covid infection in 2021  - She reportedly has no sick contact   - No shortness of breath  Plan  - Clinic collect Strep throat and Covid Test  - Encourage use of Flonase and Antihistamine as pt does have complaint of mild rhinorrhea and frontal sinus pressure  - Encourage PO hydration with warm fluid and humidifier use   - Follow up depend on testing results            Relevant Medications    fluticasone (FLONASE) 50 mcg/act nasal spray    Other Relevant Orders    Culture, Throat, Special w/Grp A Strep Suscept  COVID Only- Office Collect      HPI:  - 27 y o with history of migraines w/o aura, MDD, SERG, presented as virtual visit for complaint of sore throat  Pt reported that pain started 7 days ago and worsening in sense that she is now having mild difficulties swalling solid food; though she still has normal PO intake of fluid and soup like food  Pt denied fever, chill; she endorsed swollen tonsil, and fullness felt on her anterior cervical region  Subjectives:  Physical Exam   Constitutional: She is oriented to person, place, and time  She appears ill  HENT:   Head: Normocephalic  Mouth/Throat: Posterior oropharyngeal erythema present  Neurological: She is alert and oriented to person, place, and time  Psychiatric: Her behavior is normal  Mood normal          Recent Visits  No visits were found meeting these conditions    Showing recent visits within past 7 days and meeting all other requirements  Today's Visits  Date Type Provider Dept   05/25/22 Telemedicine Ngozi Pereyra DO [facility]   Showing today's visits and meeting all other requirements  Future Appointments  No visits were found meeting these conditions    Showing future appointments within next 150 days and meeting all other requirements         I spent 15 minutes directly with the patient during this visit

## 2022-05-25 NOTE — ASSESSMENT & PLAN NOTE
- Sore throat started more than 7 days ago   - Reported swollen of her tonsil  - Has mild dysphagia with solid   - Minimal dry cough noticed, afebrile, chill  - Felt her anterior cervical region "a bit swollen"  - Pt had history of Covid infection in 2021  - She reportedly has no sick contact   - No shortness of breath  Plan  - Clinic collect Strep throat and Covid Test  - Encourage use of Flonase and Antihistamine as pt does have complaint of mild rhinorrhea and frontal sinus pressure  - Encourage PO hydration with warm fluid and humidifier use   - Follow up depend on testing results

## 2022-05-26 ENCOUNTER — TELEPHONE (OUTPATIENT)
Dept: FAMILY MEDICINE CLINIC | Facility: CLINIC | Age: 27
End: 2022-05-26

## 2022-05-26 LAB — SARS-COV-2 RNA RESP QL NAA+PROBE: NEGATIVE

## 2022-05-26 NOTE — TELEPHONE ENCOUNTER
Reached out to pt to inform her about result of recent test  - Negative strep and Covid   Pt informed she has started to use Flonase and Claritin since yesterday  I also recommend her to try salt water throat wash BID  - Inform pt to call back to clinic if sx persists for more than 10 days, or if she developed fever, or worsening dysphagia, or lydia angina

## 2022-05-27 NOTE — TELEPHONE ENCOUNTER
Called patient caregiver and let her know about the Throat culture results fr the patient(Negative0  I ask how is patient doing per caregiver she's doing better today and she's going to take her Allergy medication today  I also tell her if anything don't hesitate to call us back

## 2022-05-31 ENCOUNTER — TELEPHONE (OUTPATIENT)
Dept: FAMILY MEDICINE CLINIC | Facility: CLINIC | Age: 27
End: 2022-05-31

## 2022-07-05 ENCOUNTER — OFFICE VISIT (OUTPATIENT)
Dept: FAMILY MEDICINE CLINIC | Facility: CLINIC | Age: 27
End: 2022-07-05

## 2022-07-05 VITALS
BODY MASS INDEX: 39.64 KG/M2 | DIASTOLIC BLOOD PRESSURE: 87 MMHG | TEMPERATURE: 98.5 F | HEIGHT: 62 IN | SYSTOLIC BLOOD PRESSURE: 121 MMHG | RESPIRATION RATE: 16 BRPM | OXYGEN SATURATION: 99 % | WEIGHT: 215.4 LBS | HEART RATE: 60 BPM

## 2022-07-05 DIAGNOSIS — F32.A ANXIETY AND DEPRESSION: Primary | ICD-10-CM

## 2022-07-05 DIAGNOSIS — F41.9 ANXIETY AND DEPRESSION: Primary | ICD-10-CM

## 2022-07-05 DIAGNOSIS — R35.0 URINARY FREQUENCY: ICD-10-CM

## 2022-07-05 LAB
SL AMB  POCT GLUCOSE, UA: NEGATIVE
SL AMB LEUKOCYTE ESTERASE,UA: NEGATIVE
SL AMB POCT BILIRUBIN,UA: NEGATIVE
SL AMB POCT BLOOD,UA: ABNORMAL
SL AMB POCT CLARITY,UA: YELLOW
SL AMB POCT COLOR,UA: CLEAR
SL AMB POCT KETONES,UA: NEGATIVE
SL AMB POCT NITRITE,UA: NEGATIVE
SL AMB POCT PH,UA: 7
SL AMB POCT SPECIFIC GRAVITY,UA: 1
SL AMB POCT URINE HCG: NEGATIVE
SL AMB POCT URINE PROTEIN: NEGATIVE
SL AMB POCT UROBILINOGEN: 0.2

## 2022-07-05 PROCEDURE — 81025 URINE PREGNANCY TEST: CPT | Performed by: FAMILY MEDICINE

## 2022-07-05 PROCEDURE — 81002 URINALYSIS NONAUTO W/O SCOPE: CPT | Performed by: FAMILY MEDICINE

## 2022-07-05 PROCEDURE — 99213 OFFICE O/P EST LOW 20 MIN: CPT | Performed by: FAMILY MEDICINE

## 2022-07-05 RX ORDER — HYDROXYZINE HYDROCHLORIDE 25 MG/1
25 TABLET, FILM COATED ORAL EVERY 6 HOURS PRN
Qty: 30 TABLET | Refills: 2 | Status: SHIPPED | OUTPATIENT
Start: 2022-07-05

## 2022-07-05 RX ORDER — BUPROPION HYDROCHLORIDE 150 MG/1
150 TABLET ORAL EVERY MORNING
Qty: 30 TABLET | Refills: 3 | Status: SHIPPED | OUTPATIENT
Start: 2022-07-05 | End: 2022-09-01 | Stop reason: SDUPTHER

## 2022-07-05 NOTE — ASSESSMENT & PLAN NOTE
Given absence of classical symptoms and generally normal UA findings,  Not concerning for infection at this time  Will not prescribe antibiotics  Patient also recently finished her  Menstrual cycle and has some blood in UA     Continue to monitor symptoms and follow-up if worsening or persistent

## 2022-07-05 NOTE — ASSESSMENT & PLAN NOTE
PHQ9 score 16 moderately severe depression  Patient previously on Prozac which did not help, was started on Lexapro for anxiety symptoms 2/2022 however patient reports this made her feel dizzy and also did not help with mood so she stopped it in April  Patient interested in changing medication  Following with therapist however schedule not working out, looking for a new therapist    Interested in establishing with Psychiatry, requesting assistance  -  As patient has failed 2 trials of SSRI,  Will trial bupropion 150 mg 24 hour tablet  -  Extensive education provided, stressed importance of remembers to take medication and taking every day despite may take longer to take effect   -  Due to also having extreme anxiety and panic attacks, will also trial of Atarax 25 mg q 6 hours p r n  which pt was interested in and amenable to  - continue establish with therapy  - social work referral for assistance with psychiatry   - follow up in 1 month to assess for improvement       Depression Screening Follow-up Plan: Patient's depression screening was positive with a PHQ-2 score of 2  Their PHQ-9 score was 16  See plan above    PHQ-2/9 Depression Screening    Little interest or pleasure in doing things: 1 - several days  Feeling down, depressed, or hopeless: 2 - more than half the days  Trouble falling or staying asleep, or sleeping too much: 3 - nearly every day  Feeling tired or having little energy: 3 - nearly every day  Poor appetite or overeating: 3 - nearly every day  Feeling bad about yourself - or that you are a failure or have let yourself or your family down: 3 - nearly every day  Trouble concentrating on things, such as reading the newspaper or watching television: 1 - several days  Moving or speaking so slowly that other people could have noticed   Or the opposite - being so fidgety or restless that you have been moving around a lot more than usual: 0 - not at all  Thoughts that you would be better off dead, or of hurting yourself in some way: 0 - not at all  PHQ-2 Score: 2  PHQ-2 Interpretation: Negative depression screen  PHQ-9 Score: 16   PHQ-9 Interpretation: Moderately severe depression

## 2022-07-05 NOTE — PATIENT INSTRUCTIONS
Anxiety   WHAT YOU NEED TO KNOW:   What do I need to know about anxiety? Anxiety is a condition that causes you to feel extremely worried or nervous  The feelings are so strong that they can cause problems with your daily activities or sleep  Anxiety may be triggered by something you fear, or it may happen without a cause  Family or work stress, smoking, caffeine, and alcohol can increase your risk for anxiety  Certain medicines or health conditions can also increase your risk  Anxiety can become a long-term condition if it is not managed or treated  What other common signs and symptoms may occur with anxiety? Fatigue or muscle tightness    Shaking, restlessness, or irritability    Problems focusing    Trouble sleeping    Feeling jumpy, easily startled, or dizzy    Rapid heartbeat or shortness of breath    What do I need to tell my healthcare provider about my anxiety? Tell your healthcare provider when your symptoms began and what triggers them  Tell your provider if anxiety affects your daily activities  Your provider will also ask about your medical history and if you have family members with a similar condition  Tell your provider about your past and present alcohol, nicotine, or drug use  What can I do to manage anxiety? You may get medicines to help you feel calm and relaxed, and to decrease your symptoms  Medicines are usually given together with therapy or other treatments  The following can help you manage anxiety:  Talk to someone about your anxiety  Your healthcare provider may suggest counseling  Cognitive behavioral therapy can help you understand and change how you react to events that trigger your symptoms  You might feel more comfortable talking with a friend or family member about your anxiety  Choose someone you know will be supportive and encouraging  Find ways to relax  Activities such as exercise, meditation, or listening to music can help you relax   Spend time with friends, or do things you enjoy  Practice deep breathing  Deep breathing can help you relax when you feel anxious  Focus on taking slow, deep breaths several times a day, or during an anxiety attack  Breathe in through your nose and out through your mouth  Create a regular sleep routine  Regular sleep can help you feel calmer during the day  Go to sleep and wake up at the same times every day  Do not watch television or use the computer right before bed  Your room should be comfortable, dark, and quiet  Eat a variety of healthy foods  Healthy foods include fruits, vegetables, low-fat dairy products, lean meats, fish, whole-grain breads, and cooked beans  Healthy foods can help you feel less anxious and have more energy  Exercise regularly  Exercise can increase your energy level  Exercise may also lift your mood and help you sleep better  Your healthcare provider can help you create an exercise plan  Do not smoke  Nicotine and other chemicals in cigarettes and cigars can increase anxiety  Ask your healthcare provider for information if you currently smoke and need help to quit  E-cigarettes or smokeless tobacco still contain nicotine  Talk to your healthcare provider before you use these products  Do not have caffeine  Caffeine can make your symptoms worse  Do not have foods or drinks that are meant to increase your energy level  Limit or do not drink alcohol  Ask your healthcare provider if alcohol is safe for you  You may not be able to drink alcohol if you take certain anxiety or depression medicines  Limit alcohol to 1 drink per day if you are a woman  Limit alcohol to 2 drinks per day if you are a man  A drink of alcohol is 12 ounces of beer, 5 ounces of wine, or 1½ ounces of liquor  Do not use drugs  Drugs can make your anxiety worse  It can also make anxiety hard to manage  Talk to your healthcare provider if you use drugs and want help to quit      Call your local emergency number (911 in the [address]) if:   You have chest pain, tightness, or heaviness that may spread to your shoulders, arms, jaw, neck, or back  You feel like hurting yourself or someone else  When should I call my doctor? Your symptoms get worse or do not get better with treatment  Your anxiety keeps you from doing your regular daily activities  You have new symptoms since your last visit  You have questions or concerns about your condition or care  CARE AGREEMENT:   You have the right to help plan your care  Learn about your health condition and how it may be treated  Discuss treatment options with your healthcare providers to decide what care you want to receive  You always have the right to refuse treatment  The above information is an  only  It is not intended as medical advice for individual conditions or treatments  Talk to your doctor, nurse or pharmacist before following any medical regimen to see if it is safe and effective for you  © Copyright [employer] 2022 Information is for End User's use only and may not be sold, redistributed or otherwise used for commercial purposes   All illustrations and images included in CareNotes® are the copyrighted property of A D A M , Inc  or [address] [place] [address] [employer]

## 2022-07-05 NOTE — PROGRESS NOTES
Assessment/Plan:    Anxiety and depression  PHQ9 score 16 moderately severe depression  Patient previously on Prozac which did not help, was started on Lexapro for anxiety symptoms 2/2022 however patient reports this made her feel dizzy and also did not help with mood so she stopped it in April  Patient interested in changing medication  Following with therapist however schedule not working out, looking for a new therapist    Interested in establishing with Psychiatry, requesting assistance  -  As patient has failed 2 trials of SSRI,  Will trial bupropion 150 mg 24 hour tablet  -  Extensive education provided, stressed importance of remembers to take medication and taking every day despite may take longer to take effect   -  Due to also having extreme anxiety and panic attacks, will also trial of Atarax 25 mg q 6 hours p r n  which pt was interested in and amenable to  - continue establish with therapy  - social work referral for assistance with psychiatry   - follow up in 1 month to assess for improvement       Depression Screening Follow-up Plan: Patient's depression screening was positive with a PHQ-2 score of 2  Their PHQ-9 score was 16  See plan above    PHQ-2/9 Depression Screening    Little interest or pleasure in doing things: 1 - several days  Feeling down, depressed, or hopeless: 2 - more than half the days  Trouble falling or staying asleep, or sleeping too much: 3 - nearly every day  Feeling tired or having little energy: 3 - nearly every day  Poor appetite or overeating: 3 - nearly every day  Feeling bad about yourself - or that you are a failure or have let yourself or your family down: 3 - nearly every day  Trouble concentrating on things, such as reading the newspaper or watching television: 1 - several days  Moving or speaking so slowly that other people could have noticed   Or the opposite - being so fidgety or restless that you have been moving around a lot more than usual: 0 - not at all  Thoughts that you would be better off dead, or of hurting yourself in some way: 0 - not at all  PHQ-2 Score: 2  PHQ-2 Interpretation: Negative depression screen  PHQ-9 Score: 16   PHQ-9 Interpretation: Moderately severe depression            Urinary frequency   Given absence of classical symptoms and generally normal UA findings,  Not concerning for infection at this time  Will not prescribe antibiotics  Patient also recently finished her  Menstrual cycle and has some blood in UA  Continue to monitor symptoms and follow-up if worsening or persistent       Diagnoses and all orders for this visit:    Urinary frequency  -     POCT urine dip  -     POCT urine HCG    Anxiety and depression  -     Ambulatory Referral to Social Work Care Management Program; Future  -     buPROPion (Wellbutrin XL) 150 mg 24 hr tablet; Take 1 tablet (150 mg total) by mouth every morning  -     hydrOXYzine HCL (ATARAX) 25 mg tablet; Take 1 tablet (25 mg total) by mouth every 6 (six) hours as needed for anxiety          Subjective:      Patient ID: Harshad Kent is a 27 y o  female  HPI  26 yo female presenting for urinary symptoms and depression medication change  Increased urinary frequency, started 2 weeks ago  Also wants a pregnancy check, states last period was "weird " Last period ended 2 days ago  No burning, pain on urination  No fever or chills  Has had UTI's in the past but this time feels different because previously it would sting  Had an IUD which was taken out about 6 months ago  Lexapro started on visit in 2/2022  Stopped taking it a few months because it made her feel "funny" and dizzy  Pt also felt like it did not help at all  Previously on prozac, which she felt like was not working  Has never been on any other medications for depression and anxiety  Feels like mood is up and down  Has anxiety and has a lot of worried thoughts, feels racing thoughts, and worried about a lot of things   Also has life and social stressors, primarily relationship  Was seeing therapist previously but has not seen her due to scheduling issues  In process of looking for in-person therapist  Kaz Dunlap with scheduling with psychiatry  The following portions of the patient's history were reviewed and updated as appropriate: allergies, current medications, past family history, past medical history, past social history, past surgical history and problem list     Review of Systems   Constitutional: Negative for chills and fever  HENT: Negative for congestion and rhinorrhea  Respiratory: Negative for cough and shortness of breath  Gastrointestinal: Negative for abdominal pain, diarrhea, nausea and vomiting  Genitourinary: Positive for frequency  Negative for difficulty urinating, dysuria, hematuria, pelvic pain and urgency  Musculoskeletal: Negative for myalgias  Skin: Negative for rash and wound  Neurological: Negative for dizziness, light-headedness and headaches  Psychiatric/Behavioral: Negative for agitation and confusion  Objective:      /87 (BP Location: Left arm, Patient Position: Sitting, Cuff Size: Large)   Pulse 60   Temp 98 5 °F (36 9 °C) (Temporal)   Resp 16   Ht 5' 2" (1 575 m)   Wt 97 7 kg (215 lb 6 4 oz)   SpO2 99%   BMI 39 40 kg/m²        Physical Exam  Vitals reviewed  Constitutional:       General: She is not in acute distress  Appearance: She is well-developed  HENT:      Head: Normocephalic and atraumatic  Eyes:      Extraocular Movements: Extraocular movements intact  Conjunctiva/sclera: Conjunctivae normal    Cardiovascular:      Rate and Rhythm: Normal rate and regular rhythm  Heart sounds: Normal heart sounds  No murmur heard  Pulmonary:      Effort: Pulmonary effort is normal  No respiratory distress  Breath sounds: Normal breath sounds  No wheezing or rales  Abdominal:      General: Bowel sounds are normal  There is no distension        Palpations: Abdomen is soft  Tenderness: There is no abdominal tenderness  Musculoskeletal:         General: No tenderness or deformity  Normal range of motion  Cervical back: Normal range of motion and neck supple  Skin:     General: Skin is warm and dry  Findings: No rash  Neurological:      General: No focal deficit present  Mental Status: She is alert  Mental status is at baseline                 Carlene Dominguez MD, PGY3  [facility] Medicine  Date: 7/5/2022 Time: 3:09 PM

## 2022-07-06 ENCOUNTER — PATIENT OUTREACH (OUTPATIENT)
Dept: FAMILY MEDICINE CLINIC | Facility: CLINIC | Age: 27
End: 2022-07-06

## 2022-07-06 NOTE — PROGRESS NOTES
FLEX GRAF received referral from provider Gold Robles MD to assist patient with connection to outpatient mental health  FLEX GRAF outreached patient regarding same  Patient did not answer the phone  FLEX GRAF left a voicemail requesting a return call  FLEX GRAF will continue to follow and will provide psychosocial support as necessary

## 2022-07-11 ENCOUNTER — PATIENT OUTREACH (OUTPATIENT)
Dept: FAMILY MEDICINE CLINIC | Facility: CLINIC | Age: 27
End: 2022-07-11

## 2022-07-11 NOTE — PROGRESS NOTES
FLEX GRAF received referral from provider Ora Castaneda MD to assist patient with connection to outpatient mental health  FLEX GRAF outreached patient regarding same  Patient did not answer the phone  FLEX GRAF left a voicemail requesting a return call  FLEX GRAF will send unable to reach letter as this is the second attempt to contact patient with no response  FLEX GRAF will also mail master list of outpatient mental health providers for patient to reference  FLEX GRAF will continue to be available to provide psychosocial support as necessary

## 2022-07-11 NOTE — LETTER
[address] [facility] [address]  [phone]    Re: Social Work Resources   7/11/2022       Dear Richard Cooper,    We tried to reach you by phone on 7/6/22 and 7/11/22 and was unfortunately unable to reach you regarding outpatient mental health  I included a [facility] as well as crisis resources in this letter    It is important that you contact the [address] as soon as possible if you would like further assistance at: [phone]     Sincerely,         ROBIN Darling

## 2022-07-18 ENCOUNTER — DOCUMENTATION (OUTPATIENT)
Dept: BEHAVIORAL/MENTAL HEALTH CLINIC | Facility: CLINIC | Age: 27
End: 2022-07-18

## 2022-07-18 DIAGNOSIS — F70 MILD INTELLECTUAL DISABILITY: ICD-10-CM

## 2022-07-18 DIAGNOSIS — F32.A ANXIETY AND DEPRESSION: ICD-10-CM

## 2022-07-18 DIAGNOSIS — F41.9 ANXIETY AND DEPRESSION: ICD-10-CM

## 2022-07-18 DIAGNOSIS — F33.9 MAJOR DEPRESSION, RECURRENT, CHRONIC (HCC): Primary | ICD-10-CM
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
## 2022-07-21 ENCOUNTER — TELEPHONE (OUTPATIENT)
Dept: BEHAVIORAL/MENTAL HEALTH CLINIC | Facility: CLINIC | Age: 27
End: 2022-07-21

## 2022-07-21 ENCOUNTER — TELEPHONE (OUTPATIENT)
Dept: PSYCHIATRY | Facility: CLINIC | Age: 27
End: 2022-07-21

## 2022-07-21 NOTE — TELEPHONE ENCOUNTER
DISCHARGE LETTER for JIE Foote  (certified and regular) placed in outgoing mail on 07/21/22      Article #:      Address:  [address]  [address] [place] [address]

## 2022-07-21 NOTE — TELEPHONE ENCOUNTER
contacted  Lauri Tripp in regards to message recieved from Dr Anne Marie Boyle and notified her that patient was discharged this week and would need to be added to the waitlist for both med mgmt and tlk therapy   also requested extra resources

## 2022-07-27 ENCOUNTER — TELEPHONE (OUTPATIENT)
Dept: NEUROLOGY | Facility: CLINIC | Age: 27
End: 2022-07-27

## 2022-07-27 NOTE — TELEPHONE ENCOUNTER
Spoke with Gilda Turner to let her know the current referral will be closed since a referral from [facility] is needed  She is aware and in the process of getting one now

## 2022-08-11 ENCOUNTER — TELEPHONE (OUTPATIENT)
Dept: FAMILY MEDICINE CLINIC | Facility: CLINIC | Age: 27
End: 2022-08-11
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
## 2022-09-01 ENCOUNTER — OFFICE VISIT (OUTPATIENT)
Dept: FAMILY MEDICINE CLINIC | Facility: CLINIC | Age: 27
End: 2022-09-01

## 2022-09-01 VITALS
DIASTOLIC BLOOD PRESSURE: 84 MMHG | RESPIRATION RATE: 16 BRPM | HEIGHT: 62 IN | TEMPERATURE: 98.3 F | BODY MASS INDEX: 40.56 KG/M2 | HEART RATE: 72 BPM | WEIGHT: 220.4 LBS | SYSTOLIC BLOOD PRESSURE: 121 MMHG

## 2022-09-01 DIAGNOSIS — F32.A ANXIETY AND DEPRESSION: Primary | ICD-10-CM

## 2022-09-01 DIAGNOSIS — N91.2 AMENORRHEA: ICD-10-CM

## 2022-09-01 DIAGNOSIS — F41.9 ANXIETY AND DEPRESSION: Primary | ICD-10-CM

## 2022-09-01 LAB
SL AMB  POCT GLUCOSE, UA: NEGATIVE
SL AMB LEUKOCYTE ESTERASE,UA: NORMAL
SL AMB POCT BILIRUBIN,UA: NEGATIVE
SL AMB POCT BLOOD,UA: NEGATIVE
SL AMB POCT CLARITY,UA: NORMAL
SL AMB POCT COLOR,UA: YELLOW
SL AMB POCT KETONES,UA: NEGATIVE
SL AMB POCT NITRITE,UA: NEGATIVE
SL AMB POCT PH,UA: 7
SL AMB POCT SPECIFIC GRAVITY,UA: 1.02
SL AMB POCT URINE HCG: NEGATIVE
SL AMB POCT URINE PROTEIN: NORMAL
SL AMB POCT UROBILINOGEN: 0.2

## 2022-09-01 PROCEDURE — 99214 OFFICE O/P EST MOD 30 MIN: CPT | Performed by: FAMILY MEDICINE

## 2022-09-01 PROCEDURE — 81002 URINALYSIS NONAUTO W/O SCOPE: CPT | Performed by: FAMILY MEDICINE

## 2022-09-01 PROCEDURE — 81025 URINE PREGNANCY TEST: CPT | Performed by: FAMILY MEDICINE

## 2022-09-01 RX ORDER — BUPROPION HYDROCHLORIDE 150 MG/1
150 TABLET ORAL EVERY MORNING
Qty: 30 TABLET | Refills: 3 | Status: SHIPPED | OUTPATIENT
Start: 2022-09-01

## 2022-09-01 NOTE — ASSESSMENT & PLAN NOTE
LMP 07/31  Pt is sexually active with male partner, has unprotected intercourse  POC UPT- negative  Reassured  Dicussed safe sex practices and pregnancy prevention
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
## 2022-09-23 ENCOUNTER — OFFICE VISIT (OUTPATIENT)
Dept: FAMILY MEDICINE CLINIC | Facility: CLINIC | Age: 27
End: 2022-09-23

## 2022-09-23 VITALS
WEIGHT: 219.4 LBS | TEMPERATURE: 98.4 F | RESPIRATION RATE: 18 BRPM | HEART RATE: 76 BPM | BODY MASS INDEX: 40.37 KG/M2 | DIASTOLIC BLOOD PRESSURE: 89 MMHG | HEIGHT: 62 IN | SYSTOLIC BLOOD PRESSURE: 140 MMHG

## 2022-09-23 DIAGNOSIS — Z59.89 DISTRESSED ABOUT HOUSING ISSUES: ICD-10-CM

## 2022-09-23 DIAGNOSIS — Z3A.01 LESS THAN 8 WEEKS GESTATION OF PREGNANCY: ICD-10-CM

## 2022-09-23 DIAGNOSIS — N91.2 AMENORRHEA: Primary | ICD-10-CM

## 2022-09-23 LAB — SL AMB POCT URINE HCG: POSITIVE

## 2022-09-23 PROCEDURE — 99213 OFFICE O/P EST LOW 20 MIN: CPT | Performed by: FAMILY MEDICINE

## 2022-09-23 PROCEDURE — 81025 URINE PREGNANCY TEST: CPT | Performed by: FAMILY MEDICINE

## 2022-09-23 SDOH — ECONOMIC STABILITY - INCOME SECURITY: OTHER PROBLEMS RELATED TO HOUSING AND ECONOMIC CIRCUMSTANCES: Z59.89

## 2022-09-23 NOTE — PROGRESS NOTES
Name: Viktor Mortensen      : 1995      MRN: [id number]  Encounter Provider: Aleida Jacobs DO  Encounter Date: 2022   Encounter department: [address] [place] [address]  Amenorrhea  Assessment & Plan:  -FDLMP: 22  -urine B-hcg in the office today is positive     Plan:   · See "pregnancy"    Orders:  -     POCT urine HCG    2  Less than 8 weeks gestation of pregnancy  Assessment & Plan:  -POCT-HCG is positive today  -first day of last menstrual period is 22     Plan:  · Counseled briefly on avoiding NSAIDs, limiting caffeine, and avoiding changing cat litter   · Pt will avoid taking imitrex if possible, will take sparingly  · Pt requests referral to OBGYN for prenatal care   · Will order prenatal labs    Orders:  -     Prenatal Panel; Future  -     Ambulatory Referral to Obstetrics / Gynecology; Future    3  Distressed about housing issues  -     Ambulatory Referral to Social Work Care Management Program; Future         Subjective        Pt presents with amenorrhea, and several days of nausea  Suspects she may be pregnant as she has been having unprotected sex with a male partner  She was seen on 22 for amenorrhea and had a negative pregnancy test at that time  The first day of her last normal menstrual period was 22  Review of Systems   Constitutional: Negative for chills and fever  HENT: Negative for ear pain and sore throat  Eyes: Negative for pain and visual disturbance  Respiratory: Negative for cough and shortness of breath  Cardiovascular: Negative for chest pain and palpitations  Gastrointestinal: Positive for nausea  Negative for abdominal pain and vomiting  Genitourinary: Positive for menstrual problem (amenorrhea)  Negative for dysuria and hematuria  Musculoskeletal: Negative for arthralgias and back pain  Skin: Negative for color change and rash  Neurological: Negative for seizures and syncope  All other systems reviewed and are negative  Current Outpatient Medications on File Prior to Visit   Medication Sig    buPROPion (Wellbutrin XL) 150 mg 24 hr tablet Take 1 tablet (150 mg total) by mouth every morning    hydrOXYzine HCL (ATARAX) 25 mg tablet Take 1 tablet (25 mg total) by mouth every 6 (six) hours as needed for anxiety    ibuprofen (MOTRIN) 600 mg tablet Take 1 tablet (600 mg total) by mouth every 6 (six) hours as needed for mild pain    ondansetron (ZOFRAN-ODT) 4 mg disintegrating tablet Take 1 tablet (4 mg total) by mouth every 6 (six) hours as needed for nausea or vomiting    SUMAtriptan (IMITREX) 25 mg tablet Take 2 tablets (50 mg total) by mouth once as needed for migraine for up to 1 dose    Diclofenac Sodium (VOLTAREN) 1 % Apply 2 g topically 4 (four) times a day (Patient not taking: Reported on 9/1/2022)    fluticasone (FLONASE) 50 mcg/act nasal spray 1 spray into each nostril daily (Patient not taking: Reported on 7/5/2022)    SUMAtriptan (IMITREX) 50 mg tablet  (Patient not taking: Reported on 7/5/2022)       Objective     /89   Pulse 76   Temp 98 4 °F (36 9 °C)   Resp 18   Ht 5' 2" (1 575 m)   Wt 99 5 kg (219 lb 6 4 oz)   LMP 08/06/2022   BMI 40 13 kg/m²     Physical Exam  Constitutional:       General: She is not in acute distress  Appearance: Normal appearance  She is not ill-appearing or toxic-appearing  HENT:      Head: Normocephalic and atraumatic  Right Ear: External ear normal       Left Ear: External ear normal       Nose: Nose normal       Mouth/Throat:      Mouth: Mucous membranes are moist       Pharynx: Oropharynx is clear  Eyes:      General: No scleral icterus  Conjunctiva/sclera: Conjunctivae normal    Cardiovascular:      Rate and Rhythm: Normal rate and regular rhythm  Heart sounds: Normal heart sounds  No murmur heard  Pulmonary:      Effort: Pulmonary effort is normal       Breath sounds: Normal breath sounds   No wheezing, rhonchi or rales  Musculoskeletal:         General: Normal range of motion  Cervical back: Neck supple  Skin:     General: Skin is warm and dry  Coloration: Skin is not jaundiced  Neurological:      General: No focal deficit present  Mental Status: She is alert and oriented to person, place, and time     Psychiatric:         Mood and Affect: Mood normal          Behavior: Behavior normal        Herminia Dumont, DO

## 2022-09-23 NOTE — ASSESSMENT & PLAN NOTE
-POCT-HCG is positive today  -first day of last menstrual period is 7/30/22     Plan:  · Counseled briefly on avoiding NSAIDs, limiting caffeine, and avoiding changing cat litter   · Pt will avoid taking imitrex if possible, will take sparingly  · Pt requests referral to OBGYN for prenatal care   · Will order prenatal labs

## 2022-10-05 ENCOUNTER — ULTRASOUND (OUTPATIENT)
Dept: OBGYN CLINIC | Facility: CLINIC | Age: 27
End: 2022-10-05
Payer: MEDICARE

## 2022-10-05 VITALS
HEIGHT: 65 IN | DIASTOLIC BLOOD PRESSURE: 74 MMHG | SYSTOLIC BLOOD PRESSURE: 110 MMHG | WEIGHT: 218.8 LBS | BODY MASS INDEX: 36.46 KG/M2

## 2022-10-05 DIAGNOSIS — N91.1 SECONDARY AMENORRHEA: Primary | ICD-10-CM

## 2022-10-05 DIAGNOSIS — G43.009 MIGRAINE WITHOUT AURA AND WITHOUT STATUS MIGRAINOSUS, NOT INTRACTABLE: ICD-10-CM

## 2022-10-05 DIAGNOSIS — Z3A.01 LESS THAN 8 WEEKS GESTATION OF PREGNANCY: ICD-10-CM

## 2022-10-05 PROCEDURE — 76817 TRANSVAGINAL US OBSTETRIC: CPT | Performed by: OBSTETRICS & GYNECOLOGY

## 2022-10-05 RX ORDER — ONDANSETRON 4 MG/1
4 TABLET, ORALLY DISINTEGRATING ORAL EVERY 6 HOURS PRN
Qty: 20 TABLET | Refills: 0 | Status: SHIPPED | OUTPATIENT
Start: 2022-10-05

## 2022-10-05 RX ORDER — PRENATAL VIT/IRON FUM/FOLIC AC 27MG-0.8MG
1 TABLET ORAL
Qty: 90 TABLET | Refills: 3 | Status: SHIPPED | OUTPATIENT
Start: 2022-10-05

## 2022-10-05 NOTE — PATIENT INSTRUCTIONS
Rapid strep negative  Will send specimen for throat cultures  Prescriptions sent to pharmacy for [facility] and Flonase-use as directed  Saline nasal spray, cool mist humidifier, saltwater gargles  Tylenol/ibuprofen as needed for fever or pain  Follow up with PCP in 3-5 days  Proceed to  ER if symptoms worsen  Cold Symptoms   AMBULATORY CARE:   Cold symptoms  include sneezing, dry throat, a stuffy nose, headache, watery eyes, and a cough  Your cough may be dry, or you may cough up mucus  You may also have muscle aches, joint pain, and tiredness  Rarely, you may have a fever  Cold symptoms occur from inflammation in your upper respiratory system caused by a virus  Most colds go away without treatment  Seek care immediately if:   · You have increased tiredness and weakness  · You are unable to eat  · Your heart is beating much faster than usual for you  · You see white spots in the back of your throat and your neck is swollen and sore to the touch  · You see pinpoint or larger reddish-purple dots on your skin  Contact your healthcare provider if:   · You have a fever higher than 102°F (38 9°C)  · You have new or worsening shortness of breath  · You have thick nasal drainage for more than 2 days  · Your symptoms do not improve or get worse within 5 days  · You have questions or concerns about your condition or care  Treatment for cold symptoms  may include NSAIDS to decrease muscle aches and fever  Cold medicines may also be given to decrease coughing, nasal stuffiness, sneezing, and a runny nose  Manage your cold symptoms: The following may help relieve cold symptoms, such as a dry throat and congestion:  · Gargle with mouthwash or warm salt water as directed  · Suck on throat lozenges or hard candy  · Use a cold or warm vaporizer or humidifier to ease your breathing  · Rest for at least 2 days and then as needed to decrease tiredness and weakness       · Use petroleum based jelly around your nostrils to decrease irritation from blowing your nose  · Drink plenty of liquids  Liquids will help thin and loosen thick mucus so you can cough it up  Liquids will also keep you hydrated  Ask your healthcare provider which liquids are best for you and how much to drink each day  Prevent the spread of germs  by washing your hands often  You can spread your cold germs to others for at least 3 days after your symptoms start  Do not share items, such as eating utensils  Cover your nose and mouth when you cough or sneeze using the crook of your elbow instead of your hands  Throw used tissues in the garbage  Do not smoke:  Smoking may worsen your symptoms and increase the length of time you feel sick  Talk with your healthcare provider if you need help to stop smoking  Follow up with your healthcare provider as directed:  Write down your questions so you remember to ask them during your visits  © 2017 [employer] Information is for End User's use only and may not be sold, redistributed or otherwise used for commercial purposes  All illustrations and images included in CareNotes® are the copyrighted property of A D A M , Inc  or Wilmer Marley  The above information is an  only  It is not intended as medical advice for individual conditions or treatments  Talk to your doctor, nurse or pharmacist before following any medical regimen to see if it is safe and effective for you  99

## 2022-10-05 NOTE — PROGRESS NOTES
EARLY PREGNANCY ULTRASOUND    Ultrasound Probe Disinfection    A transvaginal ultrasound was performed  Prior to use, disinfection was performed with [facility]  Probe serial number SLOGA-B: [id number] was used    Jo Ann Hernandez  10/05/22  2:15 PM      SUBJECTIVE    HPI: Myriam Rojas is a 32 y o   female here today for early pregnancy ultrasound  Patient's last menstrual period was 2022 (exact date)    Menses are regular  This pregnancy was welcome  She is accompanied by   OBHx is significant for n/a  Not Taking a prenatal vitamin  No Known Allergies    Current Outpatient Medications:     buPROPion (Wellbutrin XL) 150 mg 24 hr tablet, Take 1 tablet (150 mg total) by mouth every morning, Disp: 30 tablet, Rfl: 3    hydrOXYzine HCL (ATARAX) 25 mg tablet, Take 1 tablet (25 mg total) by mouth every 6 (six) hours as needed for anxiety, Disp: 30 tablet, Rfl: 2    ondansetron (ZOFRAN-ODT) 4 mg disintegrating tablet, Take 1 tablet (4 mg total) by mouth every 6 (six) hours as needed for nausea or vomiting, Disp: 20 tablet, Rfl: 0    prenatal vitamin (CLASSIC FORMULA) 27-0 8 mg, Take 1 tablet by mouth daily at bedtime, Disp: 90 tablet, Rfl: 3    SUMAtriptan (IMITREX) 25 mg tablet, Take 2 tablets (50 mg total) by mouth once as needed for migraine for up to 1 dose, Disp: 60 tablet, Rfl: 2      OBJECTIVE  Vitals:    10/05/22 1357   BP: 110/74   BP Location: Left arm   Patient Position: Sitting   Cuff Size: Large   Weight: 99 2 kg (218 lb 12 8 oz)   Height: 5' 5 35" (1 66 m)         Early OB Ultrasound Procedure Note: Transvaginal US    Technician: Study performed by the interpreting physician    Indications:  Early gestation, dating & viability    Procedure Details   The entire study was done at settings of 6 0 to 8 0 MHz  Gestational Sac: Present  Crown-rump length is 2 5cm and calculates to an estimated gestational age of 10 weeks, 2 days  Embryonic cardiac activity is seen at a rate of 167 b/min  Description of fetal anatomy Normal    Cul-de-sac: no fluid  Left ovary: not seen  Right ovary: not seen    Findings:  Viable, mae intrauterine pregnancy      ASSESSMENT  Early pregnancy at 9 weeks 4 days with a calculated JOLIE of 5/6/2023 based on LMP      PLAN    1 - RTO for OB interview and PN-1 visit    MFM referral provided  All questions were answered & patient expressed understanding      Vero Sotelo

## 2022-10-07 ENCOUNTER — PATIENT OUTREACH (OUTPATIENT)
Dept: FAMILY MEDICINE CLINIC | Facility: CLINIC | Age: 27
End: 2022-10-07
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
## 2022-10-12 PROBLEM — Z00.00 MEDICARE ANNUAL WELLNESS VISIT, SUBSEQUENT: Status: RESOLVED | Noted: 2018-10-09 | Resolved: 2022-10-12

## 2022-10-17 ENCOUNTER — INITIAL PRENATAL (OUTPATIENT)
Dept: OBGYN CLINIC | Facility: CLINIC | Age: 27
End: 2022-10-17

## 2022-10-17 DIAGNOSIS — Z34.81 PRENATAL CARE, SUBSEQUENT PREGNANCY, FIRST TRIMESTER: Primary | ICD-10-CM

## 2022-10-17 NOTE — PROGRESS NOTES
OB INTAKE INTERVIEW  Patient is 27 y o y o  who presents for OB intake at 11wks  She is accompanied by:  Brandee Manriquez all took place over the phone  The father of her baby Mabel Frazier) is involved in the pregnancy and is 28years old    Last Menstrual Period: 22  Ultrasound: Measured 10/5/22 weeks 9 days on 5  Estimated Date of Delivery: 23 confirmed by week US    Signs/Symptoms of Pregnancy  Current pregnancy symptoms: nausea,vomiting few times a week-has improved with zofran use,  breast tenderness, fatigue, low back pain  Constipation no  Headaches YES using tylenol and trying to avoid imitrex  Cramping/spotting no  PICA cravings no    Diabetes-  38  If patient has 1 or more, please order early 1 hour GTT  History of GDM no  BMI >35 YES  History of PCOS or current metformin use no  History of LGA/macrosomic infant (4000g/9lbs) no    If patient has 2 or more, please order early 1 hour GTT  BMI>30 YES  AMA no  First degree relative with type 2 diabetes no  History of chronic HTN, hyperlipidemia, elevated A1C no  High risk race (, , ,  or ) no    Hypertension- if you answer yes, please order preeclampsia labs (cbc, comprehensive metabolic panel, urine protein creatinine ratio, uric acid)  History of of chronic HTN no  History of gestational HTN no  History of preeclampsia, eclampsia, or HELLP syndrome no  History of diabetes no  History of lupus, autoimmune disease, kidney disease no    Thyroid- if yes order TSH with reflex T4  History of thyroid disease no    Bleeding Disorder or Hx of DVT-patient or first degree relative with history of  Order the following if not done previously     (Factor V, antithrombin III, prothrombin gene mutation, protein C and S Ag, lupus anticoagulant, anticardiolipin, beta-2 glycoprotein)   no    OB/GYN-  History of abnormal pap smear no  History of HPV no  History of Herpes/HSV no  History of other STI (gonorrhea, chlamydia, trich) YES-trich 2020  History of prior  no  History of prior  no  History of  delivery prior to 36 weeks 6 days no  History of blood transfusion no  Ok for blood transfusion YES    Substance screening- if yes outside of tobacco for her or anyone in her home-order urine drug screen  History of tobacco use no  Currently using tobacco no  Currently using alcohol no  Presently using drugs no  Past drug use  no  IV drug use-If yes add Hep C antibody to labs no  Partner drug use no  Parent/Family drug use no    MRSA Screening-   Does the pt have a hx of MRSA? no  If yes- please follow MRSA protocol and obtain a nasal swab for MRSA culture    Immunizations:  Influenza vaccine given this season NO  Discussed Tdap vaccine YES  Discussed COVID Vaccine NO    Genetic/MFM-  Do you or your partner have a history of any of the following in yourselves or first degree relatives? Cystic fibrosis no  Spinal muscular atrophy no  Hemoglobinopathy/Sickle Cell/Thalassemia no  Fragile X Intellectual Disability no    If yes, discuss carrier screening and recommend consultation with MFM/genetic counseling  If no, discuss option for carrier screening and/or genetic testing with Nuchal Ultrasound  Patient interested YES  Appointment at [place] [id number] made YES 22    Interview education  [facility] Pregnancy Essentials Book reviewed and discussed YES added to her AVS    Nurse/Family Partnership- patient may qualify YES; referral placed no-but reviewed they must sign up before 28wks    Prenatal lab work scripts YES  Extra labs ordered:  1hr GTT    The patient has a history now or in prior pregnancy notable for:  Social concerns and intellectual disability      Details that I feel the provider should be aware of: Unplanned but welcomed pregnancy with her boyfriend An Nolan has 3 other children from two prior relationships, no information about his first to children as they were removed from care of both parents  He has another daughter with know learning disabilities but unsure of specific diagnosis  Per Taylor Barone, An also has learning disabilities  Jaya 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  She has very limited family history as she was adopted along with her 3 siblings, 1 of which is her twin  Jaya Renteria has untreated mental health concerns including, anxiety, depression and personality disorder  She states that she had care previously and is trying to establish now but is on a wait list  Her living situation is unstable and her  [address] [place] [address] appointed by [facility], is helping to look for along term solution  [address] [place] [address] Cirilo was on the call the entire time and answered most questions  [address] [place] [address] states she was trying to get her in a [address] but there was a wait list  I did review [facility] as they have additional resources available  PN1 visit scheduled  The patient was oriented to our practice, reviewed delivering physicians and [facility] for Delivery  All questions were answered      Interviewed by: Paige Robertson RN

## 2022-10-24 ENCOUNTER — ROUTINE PRENATAL (OUTPATIENT)
Dept: OBGYN CLINIC | Facility: CLINIC | Age: 27
End: 2022-10-24

## 2022-10-24 ENCOUNTER — APPOINTMENT (OUTPATIENT)
Dept: LAB | Facility: CLINIC | Age: 27
End: 2022-10-24
Payer: MEDICARE

## 2022-10-24 VITALS
DIASTOLIC BLOOD PRESSURE: 78 MMHG | HEIGHT: 63 IN | SYSTOLIC BLOOD PRESSURE: 124 MMHG | BODY MASS INDEX: 39.16 KG/M2 | WEIGHT: 221 LBS

## 2022-10-24 DIAGNOSIS — Z34.81 PRENATAL CARE, SUBSEQUENT PREGNANCY, FIRST TRIMESTER: ICD-10-CM

## 2022-10-24 DIAGNOSIS — Z3A.12 12 WEEKS GESTATION OF PREGNANCY: Primary | ICD-10-CM

## 2022-10-24 PROBLEM — N91.2 AMENORRHEA: Status: RESOLVED | Noted: 2022-09-01 | Resolved: 2022-10-24

## 2022-10-24 LAB
BACTERIA UR QL AUTO: ABNORMAL /HPF
BASOPHILS # BLD AUTO: 0.01 THOUSANDS/ÂΜL (ref 0–0.1)
BASOPHILS NFR BLD AUTO: 0 % (ref 0–1)
BILIRUB UR QL STRIP: NEGATIVE
CLARITY UR: ABNORMAL
COLOR UR: YELLOW
EOSINOPHIL # BLD AUTO: 0.09 THOUSAND/ÂΜL (ref 0–0.61)
EOSINOPHIL NFR BLD AUTO: 1 % (ref 0–6)
ERYTHROCYTE [DISTWIDTH] IN BLOOD BY AUTOMATED COUNT: 12.4 % (ref 11.6–15.1)
GLUCOSE UR STRIP-MCNC: NEGATIVE MG/DL
HBV SURFACE AG SER QL: NORMAL
HCT VFR BLD AUTO: 39.6 % (ref 34.8–46.1)
HCV AB SER QL: NORMAL
HGB BLD-MCNC: 12.7 G/DL (ref 11.5–15.4)
HGB UR QL STRIP.AUTO: ABNORMAL
IMM GRANULOCYTES # BLD AUTO: 0.04 THOUSAND/UL (ref 0–0.2)
IMM GRANULOCYTES NFR BLD AUTO: 1 % (ref 0–2)
KETONES UR STRIP-MCNC: NEGATIVE MG/DL
LEUKOCYTE ESTERASE UR QL STRIP: NEGATIVE
LYMPHOCYTES # BLD AUTO: 1.9 THOUSANDS/ÂΜL (ref 0.6–4.47)
LYMPHOCYTES NFR BLD AUTO: 21 % (ref 14–44)
MCH RBC QN AUTO: 28.2 PG (ref 26.8–34.3)
MCHC RBC AUTO-ENTMCNC: 32.1 G/DL (ref 31.4–37.4)
MCV RBC AUTO: 88 FL (ref 82–98)
MONOCYTES # BLD AUTO: 0.7 THOUSAND/ÂΜL (ref 0.17–1.22)
MONOCYTES NFR BLD AUTO: 8 % (ref 4–12)
MUCOUS THREADS UR QL AUTO: ABNORMAL
NEUTROPHILS # BLD AUTO: 6.12 THOUSANDS/ÂΜL (ref 1.85–7.62)
NEUTS SEG NFR BLD AUTO: 69 % (ref 43–75)
NITRITE UR QL STRIP: NEGATIVE
NON-SQ EPI CELLS URNS QL MICRO: ABNORMAL /HPF
NRBC BLD AUTO-RTO: 0 /100 WBCS
PH UR STRIP.AUTO: 8 [PH]
PLATELET # BLD AUTO: 219 THOUSANDS/UL (ref 149–390)
PMV BLD AUTO: 11.8 FL (ref 8.9–12.7)
PROT UR STRIP-MCNC: ABNORMAL MG/DL
RBC # BLD AUTO: 4.5 MILLION/UL (ref 3.81–5.12)
RBC #/AREA URNS AUTO: ABNORMAL /HPF
RUBV IGG SERPL IA-ACNC: 135.2 IU/ML
SL AMB  POCT GLUCOSE, UA: NORMAL
SL AMB POCT URINE PROTEIN: NORMAL
SP GR UR STRIP.AUTO: 1.02 (ref 1–1.03)
UROBILINOGEN UR STRIP-ACNC: <2 MG/DL
WBC # BLD AUTO: 8.86 THOUSAND/UL (ref 4.31–10.16)
WBC #/AREA URNS AUTO: ABNORMAL /HPF

## 2022-10-24 PROCEDURE — 36415 COLL VENOUS BLD VENIPUNCTURE: CPT

## 2022-10-24 PROCEDURE — 87086 URINE CULTURE/COLONY COUNT: CPT

## 2022-10-24 PROCEDURE — 87591 N.GONORRHOEAE DNA AMP PROB: CPT | Performed by: OBSTETRICS & GYNECOLOGY

## 2022-10-24 PROCEDURE — 80081 OBSTETRIC PANEL INC HIV TSTG: CPT

## 2022-10-24 PROCEDURE — 81001 URINALYSIS AUTO W/SCOPE: CPT

## 2022-10-24 PROCEDURE — 86803 HEPATITIS C AB TEST: CPT

## 2022-10-24 PROCEDURE — 87491 CHLMYD TRACH DNA AMP PROBE: CPT | Performed by: OBSTETRICS & GYNECOLOGY

## 2022-10-24 PROCEDURE — G0145 SCR C/V CYTO,THINLAYER,RESCR: HCPCS | Performed by: OBSTETRICS & GYNECOLOGY

## 2022-10-24 NOTE — PROGRESS NOTES
[facility]  Feels well, she has no complaints  Denies any bleeding or cramping   Pap/reflex and Gc/chlamydia done today  GA 12w 2d  Will doPrenatal labs today  Blue folder given today   Flu vaccine next visit

## 2022-10-24 NOTE — PROGRESS NOTES
Problem List Items Addressed This Visit        Other    12 weeks gestation of pregnancy - Primary     PN labs - they will try and go today to complete these  NT scan is scheduled, plans genetic screening  Uncertain if she has had flu shot, none on file currently, if unable to obtain records will get next visit  Pap and cultures collected today  Followed by social work - some housing instability  Accompanied by  today  Would like to re-establish with psych, will try and get appointment with provider in [facility] office              Other Visit Diagnoses     Prenatal care, subsequent pregnancy, first trimester        Relevant Orders    POCT urine dip (Completed)    Chlamydia/GC amplified DNA by PCR    Liquid-based pap, screening

## 2022-10-24 NOTE — PATIENT INSTRUCTIONS
Congratulations!! Please review our Pregnancy Essential Guide and [facility] L&D Virtual tour from our [employer]  [facility] P[facility] Essentials Guide  [facility] ([address] [facility])     [address] [facility] (Tessa Frye  [facility])

## 2022-10-24 NOTE — ASSESSMENT & PLAN NOTE
PN labs - they will try and go today to complete these  NT scan is scheduled, plans genetic screening  Uncertain if she has had flu shot, none on file currently, if unable to obtain records will get next visit  Pap and cultures collected today  Followed by social work - some housing instability  Accompanied by  today  Would like to re-establish with psych, will try and get appointment with provider in [facility] office

## 2022-10-25 LAB
HIV 1+2 AB+HIV1 P24 AG SERPL QL IA: NORMAL
RPR SER QL: NORMAL

## 2022-10-26 ENCOUNTER — HOSPITAL ENCOUNTER (EMERGENCY)
Facility: HOSPITAL | Age: 27
Discharge: HOME | End: 2022-10-26
Attending: EMERGENCY MEDICINE
Payer: MEDICARE

## 2022-10-26 ENCOUNTER — TELEPHONE (OUTPATIENT)
Dept: OBGYN CLINIC | Facility: CLINIC | Age: 27
End: 2022-10-26

## 2022-10-26 VITALS
SYSTOLIC BLOOD PRESSURE: 141 MMHG | OXYGEN SATURATION: 97 % | DIASTOLIC BLOOD PRESSURE: 89 MMHG | HEART RATE: 129 BPM | TEMPERATURE: 98.3 F | RESPIRATION RATE: 18 BRPM

## 2022-10-26 DIAGNOSIS — K59.00 CONSTIPATION: Primary | ICD-10-CM

## 2022-10-26 DIAGNOSIS — R19.7 DIARRHEA: ICD-10-CM

## 2022-10-26 LAB
BACTERIA UR CULT: NORMAL
BACTERIA UR QL AUTO: ABNORMAL /HPF
BILIRUB UR QL STRIP: NEGATIVE
C TRACH DNA SPEC QL NAA+PROBE: NEGATIVE
CAOX CRY URNS QL MICRO: ABNORMAL /HPF
CLARITY UR: CLEAR
COLOR UR: YELLOW
GLUCOSE UR STRIP-MCNC: NEGATIVE MG/DL
HGB UR QL STRIP.AUTO: ABNORMAL
KETONES UR STRIP-MCNC: NEGATIVE MG/DL
LEUKOCYTE ESTERASE UR QL STRIP: NEGATIVE
MUCOUS THREADS UR QL AUTO: ABNORMAL
N GONORRHOEA DNA SPEC QL NAA+PROBE: NEGATIVE
NITRITE UR QL STRIP: NEGATIVE
NON-SQ EPI CELLS URNS QL MICRO: ABNORMAL /HPF
PH UR STRIP.AUTO: 5.5 [PH] (ref 4.5–8)
PROT UR STRIP-MCNC: NEGATIVE MG/DL
RBC #/AREA URNS AUTO: ABNORMAL /HPF
SP GR UR STRIP.AUTO: >=1.03 (ref 1–1.03)
UROBILINOGEN UR QL STRIP.AUTO: 0.2 E.U./DL
WBC #/AREA URNS AUTO: ABNORMAL /HPF

## 2022-10-26 PROCEDURE — 81001 URINALYSIS AUTO W/SCOPE: CPT

## 2022-10-26 PROCEDURE — 87086 URINE CULTURE/COLONY COUNT: CPT

## 2022-10-26 RX ORDER — POLYETHYLENE GLYCOL 3350 17 G/17G
17 POWDER, FOR SOLUTION ORAL DAILY
Qty: 238 G | Refills: 0 | Status: SHIPPED | OUTPATIENT
Start: 2022-10-26

## 2022-10-26 NOTE — ED ATTENDING ATTESTATION
10/26/2022  INeftali MD, saw and evaluated the patient  I have discussed the patient with the resident/non-physician practitioner and agree with the resident's/non-physician practitioner's findings, Plan of Care, and MDM as documented in the resident's/non-physician practitioner's note, except where noted  All available labs and Radiology studies were reviewed  I was present for key portions of any procedure(s) performed by the resident/non-physician practitioner and I was immediately available to provide assistance  At this point I agree with the current assessment done in the Emergency Department  I have conducted an independent evaluation of this patient a history and physical is as follows:    ED Course     Emergency Department Note- Tiana Chow 32 y o  female MRN: [id number]    Unit/Bed#: ED 29 Encounter: [id number]    Tiana Chow is a 32 y o  female who presents with   Chief Complaint   Patient presents with   • Constipation     Pt reports constipation for 3 days, pt c/o abd and rectal pain          History of Present Illness   HPI:  Tiana Chow is a 32 y o  female who presents for evaluation of:  Constipation over the last 3 days  Patient has not been able to move her bowels  Patient notes that she has some rectal pain and left-sided abdominal pain when she attempts to move her bowels  Patient is pregnant; she is a  1 para 0; estimated gestational age 16 weeks  Review of Systems   Constitutional: Negative for chills and fever  HENT: Negative for congestion and rhinorrhea  Respiratory: Negative for cough and shortness of breath  Cardiovascular: Negative for chest pain and palpitations  Gastrointestinal: Positive for constipation  Negative for nausea and vomiting  Musculoskeletal: Negative for back pain and neck pain  Neurological: Negative for light-headedness and headaches  All other systems reviewed and are negative        Historical Information   Past Medical History:   Diagnosis Date   • Altered level of consciousness 2014   • Anxiety    • Depression    • Intellectual disability     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   • Personality disorder (HCC)      Past Surgical History:   Procedure Laterality Date   • NO PAST SURGERIES       Social History   Social History     Substance and Sexual Activity   Alcohol Use None    Comment: not since confiemed preg     Social History     Substance and Sexual Activity   Drug Use Never     Social History     Tobacco Use   Smoking Status Never Smoker   Smokeless Tobacco Never Used     Family History:   Family History   Adopted: Yes   Problem Relation Age of Onset   • Intellectual disability Sister    • Intellectual disability Brother    • Intellectual disability Brother        Meds/Allergies   PTA meds:   Prior to Admission Medications   Prescriptions Last Dose Informant Patient Reported? Taking?    SUMAtriptan (IMITREX) 25 mg tablet  Self No No   Sig: Take 2 tablets (50 mg total) by mouth once as needed for migraine for up to 1 dose   buPROPion (Wellbutrin XL) 150 mg 24 hr tablet   No No   Sig: Take 1 tablet (150 mg total) by mouth every morning   hydrOXYzine HCL (ATARAX) 25 mg tablet   No No   Sig: Take 1 tablet (25 mg total) by mouth every 6 (six) hours as needed for anxiety   ondansetron (ZOFRAN-ODT) 4 mg disintegrating tablet   No No   Sig: Take 1 tablet (4 mg total) by mouth every 6 (six) hours as needed for nausea or vomiting   prenatal vitamin (CLASSIC FORMULA) 27-0 8 mg   No No   Sig: Take 1 tablet by mouth daily at bedtime      Facility-Administered Medications: None     No Known Allergies    Objective   First Vitals:   Blood Pressure: 141/89 (10/26/22 0257)  Pulse: (!) 129 (10/26/22 0257)  Temperature: 98 3 °F (36 8 °C) (10/26/22 0257)  Temp Source: Oral (10/26/22 0257)  Respirations: 18 (10/26/22 0257)  SpO2: 97 % (10/26/22 0257)    Current Vitals:   Blood Pressure: 141/89 (10/26/22 0257)  Pulse: (!) 129 (10/26/22 0257)  Temperature: 98 3 °F (36 8 °C) (10/26/22 0257)  Temp Source: Oral (10/26/22 0257)  Respirations: 18 (10/26/22 0257)  SpO2: 97 % (10/26/22 0257)    No intake or output data in the 24 hours ending 10/26/22 0504    Invasive Devices  Report    None                 Physical Exam  Vitals and nursing note reviewed  Constitutional:       General: She is not in acute distress  Appearance: Normal appearance  She is well-developed  HENT:      Head: Normocephalic and atraumatic  Right Ear: External ear normal       Left Ear: External ear normal       Nose: Nose normal       Mouth/Throat:      Pharynx: No oropharyngeal exudate  Eyes:      Conjunctiva/sclera: Conjunctivae normal       Pupils: Pupils are equal, round, and reactive to light  Cardiovascular:      Rate and Rhythm: Normal rate and regular rhythm  Pulmonary:      Effort: Pulmonary effort is normal  No respiratory distress  Abdominal:      General: Abdomen is flat  There is no distension  Palpations: Abdomen is soft  Musculoskeletal:         General: No deformity  Normal range of motion  Cervical back: Normal range of motion and neck supple  Skin:     General: Skin is warm and dry  Capillary Refill: Capillary refill takes less than 2 seconds  Neurological:      General: No focal deficit present  Mental Status: She is alert and oriented to person, place, and time  Mental status is at baseline  Coordination: Coordination normal    Psychiatric:         Mood and Affect: Mood normal          Behavior: Behavior normal          Thought Content: Thought content normal          Judgment: Judgment normal            Medical Decision Makin  Constipation:  Resolved without treatment in the ED; patient is having diarrhea x3 episodes in the ED   patient will be prescribed Metamucil and polyethylene glycol for any constipation issues in the future    2  First-trimester pregnancy:  Bedside ultrasound demonstrates fetal heart rate of 153 beats per minute     Recent Results (from the past 36 hour(s))   Urine Macroscopic, POC    Collection Time: 10/26/22  3:47 AM   Result Value Ref Range    Color, UA Yellow     Clarity, UA Clear     pH, UA 5 5 4 5 - 8 0    Leukocytes, UA Negative Negative    Nitrite, UA Negative Negative    Protein, UA Negative Negative mg/dl    Glucose, UA Negative Negative mg/dl    Ketones, UA Negative Negative mg/dl    Urobilinogen, UA 0 2 0 2, 1 0 E U /dl E U /dl    Bilirubin, UA Negative Negative    Occult Blood, UA Trace (A) Negative    Specific Gravity, UA >=1 030 1 003 - 1 030   Urine Microscopic    Collection Time: 10/26/22  3:47 AM   Result Value Ref Range    RBC, UA 1-2 None Seen, 1-2 /hpf    WBC, UA 1-2 None Seen, 1-2 /hpf    Epithelial Cells Innumerable (A) None Seen, Occasional /hpf    Bacteria, UA Innumerable (A) None Seen, Occasional /hpf    MUCUS THREADS Occasional (A) None Seen    Ca Oxalate Susan, UA Occasional (A) None Seen /hpf     No orders to display         Portions of the record may have been created with voice recognition software  Occasional wrong word or "sound a like" substitutions may have occurred due to the inherent limitations of voice recognition software  Read the chart carefully and recognize, using context, where substitutions have occurred          Critical Care Time  Procedures

## 2022-10-26 NOTE — DISCHARGE INSTRUCTIONS
psyllium and polyethylene glycol from the pharmacy  Use as needed  Return to ER if you experience any abdominal pain with vaginal bleeding

## 2022-10-26 NOTE — TELEPHONE ENCOUNTER
[address] [place] in ADT bin  ( was no way to make an encounter in there)      "Regis Perkins, MICHAEL  [address] [facility] [facility]  Will you please call Sherry  It looks like she was in the ER yesterday due to constipation  Can you check in to see how she is doing and make sure that she has the medications safe in pregnancy sheet  Please let me know if she has any questions or concerns  Janice Ribera "      Called pt- got her vm- reviewed everything on med list for constipation, push fluids, colace, laxative, prune juice  Let us know if she needs anything further

## 2022-10-26 NOTE — ED PROVIDER NOTES
History  Chief Complaint   Patient presents with   • Constipation     Pt reports constipation for 3 days, pt c/o abd and rectal pain      17-year-old  female at 15 and half weeks gestation by ultrasound presents with constipation  Patient reports no bowel movements for the last 3 days and lower abdominal pain while straining to defecate  Abdominal pain is not present at any other time  Denies vaginal bleeding, urinary symptoms, nausea, or vomiting  Taking prune juice at home  Prior to Admission Medications   Prescriptions Last Dose Informant Patient Reported? Taking? SUMAtriptan (IMITREX) 25 mg tablet  Self No No   Sig: Take 2 tablets (50 mg total) by mouth once as needed for migraine for up to 1 dose   buPROPion (Wellbutrin XL) 150 mg 24 hr tablet   No No   Sig: Take 1 tablet (150 mg total) by mouth every morning   hydrOXYzine HCL (ATARAX) 25 mg tablet   No No   Sig: Take 1 tablet (25 mg total) by mouth every 6 (six) hours as needed for anxiety   ondansetron (ZOFRAN-ODT) 4 mg disintegrating tablet   No No   Sig: Take 1 tablet (4 mg total) by mouth every 6 (six) hours as needed for nausea or vomiting   prenatal vitamin (CLASSIC FORMULA) 27-0 8 mg   No No   Sig: Take 1 tablet by mouth daily at bedtime      Facility-Administered Medications: None       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   • Personality disorder (HCC)        Past Surgical History:   Procedure Laterality Date   • NO PAST SURGERIES         Family History   Adopted: Yes   Problem Relation Age of Onset   • Intellectual disability Sister    • Intellectual disability Brother    • Intellectual disability Brother      I have reviewed and agree with the history as documented      E-Cigarette/Vaping   • E-Cigarette Use Never User      E-Cigarette/Vaping Substances   • Nicotine No    • THC No    • CBD No • Flavoring No    • Other No    • Unknown No      Social History     Tobacco Use   • Smoking status: Never Smoker   • Smokeless tobacco: Never Used   Vaping Use   • Vaping Use: Never used   Substance Use Topics   • Drug use: Never        Review of Systems   Constitutional: Negative for chills and fever  HENT: Negative for ear pain and sore throat  Eyes: Negative for pain and visual disturbance  Respiratory: Negative for cough and shortness of breath  Cardiovascular: Negative for chest pain and palpitations  Gastrointestinal: Positive for abdominal pain and constipation  Negative for vomiting  Genitourinary: Negative for dysuria and hematuria  Musculoskeletal: Negative for arthralgias and back pain  Skin: Negative for color change and rash  Neurological: Negative for seizures and syncope  All other systems reviewed and are negative  Physical Exam  ED Triage Vitals [10/26/22 0257]   Temperature Pulse Respirations Blood Pressure SpO2   98 3 °F (36 8 °C) (!) 129 18 141/89 97 %      Temp Source Heart Rate Source Patient Position - Orthostatic VS BP Location FiO2 (%)   Oral Monitor Sitting Left arm --      Pain Score       10 - Worst Possible Pain             Orthostatic Vital Signs  Vitals:    10/26/22 0257   BP: 141/89   Pulse: (!) 129   Patient Position - Orthostatic VS: Sitting       Physical Exam  Vitals and nursing note reviewed  Constitutional:       General: She is not in acute distress  Appearance: Normal appearance  She is normal weight  She is not ill-appearing, toxic-appearing or diaphoretic  HENT:      Head: Normocephalic and atraumatic  Right Ear: External ear normal       Left Ear: External ear normal       Nose: Nose normal       Mouth/Throat:      Mouth: Mucous membranes are moist       Pharynx: Oropharynx is clear  Eyes:      General:         Right eye: No discharge  Left eye: No discharge        Conjunctiva/sclera: Conjunctivae normal  Cardiovascular:      Rate and Rhythm: Normal rate and regular rhythm  Pulses: Normal pulses  Heart sounds: Normal heart sounds  No murmur heard  No friction rub  Pulmonary:      Effort: Pulmonary effort is normal  No respiratory distress  Breath sounds: Normal breath sounds  No wheezing or rales  Abdominal:      General: Abdomen is flat  Bowel sounds are normal  There is no distension  Palpations: Abdomen is soft  Tenderness: There is no abdominal tenderness  There is no guarding  Musculoskeletal:         General: Normal range of motion  Cervical back: Normal range of motion  Right lower leg: No edema  Left lower leg: No edema  Skin:     General: Skin is warm and dry  Capillary Refill: Capillary refill takes less than 2 seconds  Coloration: Skin is not pale  Neurological:      Mental Status: She is alert and oriented to person, place, and time  Psychiatric:         Mood and Affect: Mood normal          Behavior: Behavior normal          ED Medications  Medications - No data to display    Diagnostic Studies  Results Reviewed     Procedure Component Value Units Date/Time    Urine Microscopic [[id number]]  (Abnormal) Collected: 10/26/22 0347    Lab Status: Final result Specimen: Urine, Other Updated: 10/26/22 0504     RBC, UA 1-2 /hpf      WBC, UA 1-2 /hpf      Epithelial Cells Innumerable /hpf      Bacteria, UA Innumerable /hpf      MUCUS THREADS Occasional     Ca Oxalate Susan, UA Occasional /hpf     Urine culture [[id number]] Collected: 10/26/22 0347    Lab Status:  In process Specimen: Urine, Other Updated: 10/26/22 0354    Urine Macroscopic, POC [[id number]]  (Abnormal) Collected: 10/26/22 0347    Lab Status: Final result Specimen: Urine Updated: 10/26/22 0348     Color, UA Yellow     Clarity, UA Clear     pH, UA 5 5     Leukocytes, UA Negative     Nitrite, UA Negative     Protein, UA Negative mg/dl      Glucose, UA Negative mg/dl      Ketones, UA Negative mg/dl      Urobilinogen, UA 0 2 E U /dl      Bilirubin, UA Negative     Occult Blood, UA Trace     Specific Gravity, UA >=1 030    Narrative:      CLINITEK RESULT                 No orders to display         Procedures  POC Pelvic US    Date/Time: 10/26/2022 4:16 AM  Performed by: Chauncey Jang MD  Authorized by: Makayla Burroughs MD     Patient location:  ED  Other Assisting Provider: No    Procedure details:     Exam Type:  Diagnostic    Indications: pregnant with abdominal pain      Assessment for: evaluate fetal viability      Technique:  Transabdominal obstetric (HCG+) exam    Views obtained: uterus (transverse and sagittal)      Image quality: diagnostic      Image availability:  Images available in PACS, still images obtained and video obtained  Uterine findings:     Intrauterine pregnancy: identified      Single gestation: identified      Fetal heart rate: identified      Fetal heart rate (bpm):  153  Interpretation:     Ultrasound impressions: normal      Pregnancy findings: intrauterine pregnancy (IUP)            ED Course                                       MDM  Number of Diagnoses or Management Options  Constipation: new and does not require workup  Diarrhea: new and does not require workup  Diagnosis management comments: Impression:  18-year-old well-appearing female presents with constipation  No bowel movements x3 days  Shortly after my exam nursing staff informed me that patient reported that she has had 2 loose stools while in the department     Plan:  Urine, scripts for polyethylene glycol and Metamucil, trans abdominal point of care pelvic ultrasound       Amount and/or Complexity of Data Reviewed  Review and summarize past medical records: yes    Risk of Complications, Morbidity, and/or Mortality  Presenting problems: low  Diagnostic procedures: minimal  Management options: minimal    Patient Progress  Patient progress: resolved      Disposition  Final diagnoses:   Constipation Diarrhea     Time reflects when diagnosis was documented in both MDM as applicable and the Disposition within this note     Time User Action Codes Description Comment    10/26/2022  4:50 AM Valentino Fey Add [K59 00] Constipation     10/26/2022  4:51 AM Valentino Fey Add [R19 7] Diarrhea       ED Disposition     ED Disposition   Discharge    Condition   Stable    Date/Time   Wed Oct 26, 2022  4:50 AM    Comment   Stephanie Mckeon discharge to home/self care  Follow-up Information     Follow up With Specialties Details Why Contact Info    Mahesh Cabrera MD Family Medicine Call  If symptoms worsen Lauren Thomason 3  [phone]            Discharge Medication List as of 10/26/2022  4:53 AM      START taking these medications    Details   polyethylene glycol (GLYCOLAX) 17 GM/SCOOP powder Take 17 g by mouth daily, Starting Wed 10/26/2022, Normal      psyllium (METAMUCIL SMOOTH TEXTURE) 28 % packet Take 1 packet by mouth 2 (two) times a day for 10 days, Starting Wed 10/26/2022, Until Sat 11/5/2022, Normal         CONTINUE these medications which have NOT CHANGED    Details   buPROPion (Wellbutrin XL) 150 mg 24 hr tablet Take 1 tablet (150 mg total) by mouth every morning, Starting Thu 9/1/2022, Normal      hydrOXYzine HCL (ATARAX) 25 mg tablet Take 1 tablet (25 mg total) by mouth every 6 (six) hours as needed for anxiety, Starting Tue 7/5/2022, Normal      ondansetron (ZOFRAN-ODT) 4 mg disintegrating tablet Take 1 tablet (4 mg total) by mouth every 6 (six) hours as needed for nausea or vomiting, Starting Wed 10/5/2022, Normal      prenatal vitamin (CLASSIC FORMULA) 27-0 8 mg Take 1 tablet by mouth daily at bedtime, Starting Wed 10/5/2022, Normal      SUMAtriptan (IMITREX) 25 mg tablet Take 2 tablets (50 mg total) by mouth once as needed for migraine for up to 1 dose, Starting Thu 10/28/2021, Normal           No discharge procedures on file      PDMP Review     None           ED Provider  Attending physically available and evaluated Jason Sneed I managed the patient along with the ED Attending      Electronically Signed by         Chantal Glass MD  10/27/22 6799

## 2022-10-27 ENCOUNTER — TELEPHONE (OUTPATIENT)
Dept: OBGYN CLINIC | Facility: CLINIC | Age: 27
End: 2022-10-27

## 2022-10-27 ENCOUNTER — APPOINTMENT (OUTPATIENT)
Dept: LAB | Facility: CLINIC | Age: 27
End: 2022-10-27
Payer: MEDICARE

## 2022-10-27 DIAGNOSIS — Z34.81 PRENATAL CARE, SUBSEQUENT PREGNANCY, FIRST TRIMESTER: ICD-10-CM

## 2022-10-27 DIAGNOSIS — O99.810 ABNORMAL GLUCOSE AFFECTING PREGNANCY: Primary | ICD-10-CM

## 2022-10-27 LAB
ABO GROUP BLD: NORMAL
BACTERIA UR CULT: NORMAL
BLD GP AB SCN SERPL QL: NEGATIVE
GLUCOSE 1H P 50 G GLC PO SERPL-MCNC: 144 MG/DL (ref 40–134)
RH BLD: POSITIVE

## 2022-10-27 PROCEDURE — 82950 GLUCOSE TEST: CPT

## 2022-10-27 PROCEDURE — 36415 COLL VENOUS BLD VENIPUNCTURE: CPT

## 2022-10-27 NOTE — TELEPHONE ENCOUNTER
----- Message from Alissa Johnson MD sent at 10/27/2022 12:26 PM EDT -----  Please let Mandie Sexton know she failed her 1 hour glucola, needs 3hr GTT    Thanks

## 2022-10-27 NOTE — TELEPHONE ENCOUNTER
Spoke to pt  and reviewed results and recommendations from their glucose per  CT  Needs 3hr GTT- reviewed instructions      had a question about the labs that were done in the ER  she saw them on [facility] and wondered if pt had an infection

## 2022-10-30 LAB
LAB AP GYN PRIMARY INTERPRETATION: NORMAL
Lab: NORMAL

## 2022-11-04 ENCOUNTER — ROUTINE PRENATAL (OUTPATIENT)
Dept: PERINATAL CARE | Facility: OTHER | Age: 27
End: 2022-11-04

## 2022-11-04 ENCOUNTER — APPOINTMENT (OUTPATIENT)
Dept: LAB | Facility: CLINIC | Age: 27
End: 2022-11-04

## 2022-11-04 VITALS
HEART RATE: 88 BPM | SYSTOLIC BLOOD PRESSURE: 110 MMHG | WEIGHT: 219.36 LBS | DIASTOLIC BLOOD PRESSURE: 70 MMHG | HEIGHT: 63 IN | BODY MASS INDEX: 38.87 KG/M2

## 2022-11-04 DIAGNOSIS — O99.211 MATERNAL OBESITY, ANTEPARTUM, FIRST TRIMESTER: ICD-10-CM

## 2022-11-04 DIAGNOSIS — N91.1 SECONDARY AMENORRHEA: ICD-10-CM

## 2022-11-04 DIAGNOSIS — Z36.82 ENCOUNTER FOR ANTENATAL SCREENING FOR NUCHAL TRANSLUCENCY: Primary | ICD-10-CM

## 2022-11-04 DIAGNOSIS — O99.810 ABNORMAL GLUCOSE AFFECTING PREGNANCY: ICD-10-CM

## 2022-11-04 DIAGNOSIS — Z3A.13 13 WEEKS GESTATION OF PREGNANCY: ICD-10-CM

## 2022-11-04 LAB
GLUCOSE 1H P 100 G GLC PO SERPL-MCNC: 132 MG/DL (ref 70–183)
GLUCOSE 2H P 100 G GLC PO SERPL-MCNC: 109 MG/DL (ref 70–155)
GLUCOSE 3H P 100 G GLC PO SERPL-MCNC: 102 MG/DL (ref 70–140)
GLUCOSE P FAST SERPL-MCNC: 79 MG/DL (ref 65–94)

## 2022-11-04 RX ORDER — ASPIRIN 81 MG/1
162 TABLET, CHEWABLE ORAL DAILY
Qty: 180 TABLET | Refills: 1 | Status: SHIPPED | OUTPATIENT
Start: 2022-11-04 | End: 2023-02-02

## 2022-11-04 NOTE — PROGRESS NOTES
Patient chose to have [facility] Non-invasive Prenatal Screen with fetal sex  Patient given brochure and is aware [facility] will contact patients insurance and coordinate coverage  Patient made aware she will need to respond to text message or e-mail from [employer] within 2 business days or testing will be run through insurance  Patient informed text message will come from area code  "415"  Provided [employer] # [phone] and web site : [email or web address]  "[place] your test online" card with barcode and test tube ID provided to patient  Reviewed [employer] web site states 5-7 business days for results via their portal  [facility] states 7-10 business days for results to be in [facility] [address]  A Smart Education message will be sent once we receive results  2 vials of blood drawn from RIGHT arm by Hilary Raza RN  Patient tolerated blood draw without difficulty  Specimens labeled with patient identifiers (name, date of birth, specimen collection date), order and specimen was verified with patient, packed and sent via [employer]  Copy of lab order scanned to Epic media  Maternal Fetal Medicine will have results in approximately 7-10 business days and will call patient or notify via [address]  Patient aware viewing lab result online will reveal fetal sex if ordered  Patient verbalized understanding of all instructions and no questions at this time

## 2022-11-04 NOTE — LETTER
November 4, 2022     Karen Levin [address]    Patient: Jose Miguel England   YOB: 1995   Date of Visit: 11/4/2022       Dear Dr Sharyn Luong: Thank you for referring Jose Miguel England to me for evaluation  Below are my notes for this consultation  If you have questions, please do not hesitate to call me  I look forward to following your patient along with you  Sincerely,        Andres Edmonds MD        CC: No Recipients  Andres Edmonds MD  11/3/2022  6:44 PM  Sign when Signing Visit  Please refer to the [facility] ultrasound report in Ob Procedures for additional information regarding today's visit

## 2022-11-07 DIAGNOSIS — G43.009 MIGRAINE WITHOUT AURA AND WITHOUT STATUS MIGRAINOSUS, NOT INTRACTABLE: ICD-10-CM

## 2022-11-07 RX ORDER — ONDANSETRON 4 MG/1
4 TABLET, ORALLY DISINTEGRATING ORAL EVERY 6 HOURS PRN
Qty: 20 TABLET | Refills: 0 | Status: SHIPPED | OUTPATIENT
Start: 2022-11-07

## 2022-11-10 ENCOUNTER — OFFICE VISIT (OUTPATIENT)
Dept: OBGYN CLINIC | Facility: CLINIC | Age: 27
End: 2022-11-10

## 2022-11-10 DIAGNOSIS — F43.10 PTSD (POST-TRAUMATIC STRESS DISORDER): ICD-10-CM

## 2022-11-10 DIAGNOSIS — Z13.228 SCREENING FOR ENDOCRINE/METABOLIC/IMMUNITY DISORDERS: ICD-10-CM

## 2022-11-10 DIAGNOSIS — F33.1 MAJOR DEPRESSIVE DISORDER, RECURRENT EPISODE, MODERATE DEGREE (HCC): Primary | ICD-10-CM

## 2022-11-10 DIAGNOSIS — Z13.29 SCREENING FOR ENDOCRINE/METABOLIC/IMMUNITY DISORDERS: ICD-10-CM

## 2022-11-10 DIAGNOSIS — Z13.0 SCREENING FOR ENDOCRINE/METABOLIC/IMMUNITY DISORDERS: ICD-10-CM

## 2022-11-10 PROBLEM — F33.2 MAJOR DEPRESSIVE DISORDER, RECURRENT SEVERE WITHOUT PSYCHOTIC FEATURES (HCC): Status: ACTIVE | Noted: 2022-11-10

## 2022-11-10 NOTE — PROGRESS NOTES
TREATMENT PLAN        Ovi Moore    Name and Date of Birth:  Live Cervantes 32 y o  1995  Date of Treatment Plan: November 10, 2022  Diagnosis/Diagnoses:    1  Major depressive disorder, recurrent 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 psychotic features ([address] )    2  Screening for endocrine/metabolic/immunity disorders    3  PTSD (post-traumatic stress disorder)        Strengths/Personal Resources for Self-Care: supportive family, taking medications as prescribed, ability to adapt to life changes, ability to listen, ability to reason, good physical health, motivation for treatment, ability to negotiate basic needs  Area/Areas of need (in own words): depressive symptoms    1  Long Term Goal: improve control of anxiety  Target Date:6 months - 5/10/2023  Person/Persons responsible for completion of goal: Sherry    2  Short Term Objective (s) - How will we reach this goal?:   A  Provider new recommended medication/dosage changes and/or continue medication(s): continue current medications as prescribed  B  Call supportive people when needed   Bonita Hint all scheduled appointments  Target Date:6 months - 5/10/2023  Person/Persons Responsible for Completion of Goal: Sherry    Progress Towards Goals: starting treatment    Treatment Modality: medication management every 4 weeks  Review due 180 days from date of this plan: 6 months - 5/10/2023  Expected length of service: maintenance  My Physician/PA/NP and I have developed this plan together and I agree to work on the goals and objectives  I understand the treatment goals that were developed for my treatment
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
## 2022-11-11 ENCOUNTER — TELEPHONE (OUTPATIENT)
Dept: PSYCHOLOGY | Facility: CLINIC | Age: 27
End: 2022-11-11

## 2022-11-11 NOTE — TELEPHONE ENCOUNTER
Called pt to discuss PHP but her  Geovanni Bull attended the call and stated she will check with the patient about attending the program and will call me back      Nicole

## 2022-11-14 ENCOUNTER — TELEPHONE (OUTPATIENT)
Dept: PSYCHIATRY | Facility: CLINIC | Age: 27
End: 2022-11-14

## 2022-11-14 NOTE — TELEPHONE ENCOUNTER
CM called PT in regards to referral from Dr Pushpa Slater to ask a few questions to see how CM can better help PT get connected to resources she needs  CM LM and requested a call back

## 2022-11-18 ENCOUNTER — TELEPHONE (OUTPATIENT)
Dept: OBGYN CLINIC | Facility: CLINIC | Age: 27
End: 2022-11-18
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
## 2022-11-18 NOTE — TELEPHONE ENCOUNTER
----- Message from Ken Castellon MD sent at 11/9/2022 12:31 PM EST -----  Please let Sherry know that her pap and cultures were negative

## 2022-11-23 ENCOUNTER — ROUTINE PRENATAL (OUTPATIENT)
Dept: OBGYN CLINIC | Facility: CLINIC | Age: 27
End: 2022-11-23

## 2022-11-23 VITALS — WEIGHT: 218.6 LBS | SYSTOLIC BLOOD PRESSURE: 124 MMHG | DIASTOLIC BLOOD PRESSURE: 80 MMHG | BODY MASS INDEX: 38.72 KG/M2

## 2022-11-23 DIAGNOSIS — Z33.1 INCIDENTAL PREGNANCY: ICD-10-CM

## 2022-11-23 DIAGNOSIS — Q86.0 FETAL ALCOHOL SYNDROME: ICD-10-CM

## 2022-11-23 DIAGNOSIS — Z34.82 PRENATAL CARE, SUBSEQUENT PREGNANCY, SECOND TRIMESTER: ICD-10-CM

## 2022-11-23 DIAGNOSIS — Z3A.16 16 WEEKS GESTATION OF PREGNANCY: Primary | ICD-10-CM

## 2022-11-23 DIAGNOSIS — Z36.9 ENCOUNTER FOR ANTENATAL SCREENING: ICD-10-CM

## 2022-11-23 DIAGNOSIS — O99.212 OBESITY AFFECTING PREGNANCY IN SECOND TRIMESTER: ICD-10-CM

## 2022-11-23 DIAGNOSIS — Z23 NEED FOR INFLUENZA VACCINATION: ICD-10-CM

## 2022-11-23 LAB
SL AMB  POCT GLUCOSE, UA: NORMAL
SL AMB POCT URINE PROTEIN: NORMAL

## 2022-11-23 RX ORDER — SERTRALINE HYDROCHLORIDE 100 MG/1
TABLET, FILM COATED ORAL
COMMUNITY
Start: 2022-11-19

## 2022-11-23 NOTE — ASSESSMENT & PLAN NOTE
Abnormal early 1 hr  Normal 3 hr  Taking LDASA  Serial growth US in third trimester  APFS at 37 weeks

## 2022-11-23 NOTE — PROGRESS NOTES
Problem List Items Addressed This Visit        Other    Fetal alcohol syndrome     For maternal echo  16 weeks gestation of pregnancy - Primary     [facility]  is a 32 y o  Partha Dimas @16w4d who presents for routine prenatal visit  Denies OB complaints  Flu vaccine given today    NIPT low risk   AFP - ordered  Level II - 22    + flutters   Denies contractions, cramping, leakage of fluid or vaginal bleeding  Reviewed PTL precautions and reasons to call  Obesity affecting pregnancy in second trimester     Abnormal early 1 hr  Normal 3 hr  Taking LDASA  Serial growth US in third trimester  APFS at 37 weeks           Other Visit Diagnoses     Need for influenza vaccination        Relevant Orders    influenza vaccine, quadrivalent, 0 5 mL, preservative-free, for adult and pediatric patients 6 mos+ (AFLURIA, FLUARIX, FLULAVAL, FLUZONE)    Prenatal care, subsequent pregnancy, second trimester        Relevant Orders    POCT urine dip    Incidental pregnancy        Relevant Orders    Alpha fetoprotein, maternal    Encounter for  screening        Relevant Orders    Alpha fetoprotein, maternal

## 2022-11-23 NOTE — ASSESSMENT & PLAN NOTE
Duran Kauradry  is a 32 y o  Fei Rojas @16w4d who presents for routine prenatal visit  Denies OB complaints  Flu vaccine given today    NIPT low risk   AFP - ordered  Level II - 12/19/22    + flutters   Denies contractions, cramping, leakage of fluid or vaginal bleeding  Reviewed PTL precautions and reasons to call

## 2022-11-23 NOTE — PROGRESS NOTES
Patient here for PN visit  She denies any complaints  She is feeling flutters  PN labs completed  US scheduled 12/19/22  Urine neg for protein and glucose  Flu vaccine administered in left deltoid; tolerated well  VIS given  Order for AFP screen given to patient

## 2022-12-16 ENCOUNTER — TELEPHONE (OUTPATIENT)
Dept: OBGYN CLINIC | Facility: CLINIC | Age: 27
End: 2022-12-16

## 2022-12-16 DIAGNOSIS — G43.009 MIGRAINE WITHOUT AURA AND WITHOUT STATUS MIGRAINOSUS, NOT INTRACTABLE: ICD-10-CM

## 2022-12-16 RX ORDER — ONDANSETRON 4 MG/1
4 TABLET, ORALLY DISINTEGRATING ORAL EVERY 6 HOURS PRN
Qty: 20 TABLET | Refills: 0 | Status: SHIPPED | OUTPATIENT
Start: 2022-12-16 | End: 2022-12-19 | Stop reason: SDUPTHER

## 2022-12-16 NOTE — PROGRESS NOTES
Level 2 scheduled  UTD Flu vaccine  Laurensoheila Julian is "her staff- here interpreting for the patient "  Denies LOF, VB, CTX  + FM

## 2022-12-16 NOTE — TELEPHONE ENCOUNTER
AFP reordered as it was cancelled  Would recommend she go in the next week  Will route request for Zofran to provider

## 2022-12-16 NOTE — TELEPHONE ENCOUNTER
Call to [facility]),   Aware Zofran rf sent to pharmacy  Will go for AFP on Monday

## 2022-12-16 NOTE — TELEPHONE ENCOUNTER
Jase Gonzalez calling to request extension for the AFP order for Sherry     Can we do that?     Fernanda Rogers also gave her information about what AFP is

## 2022-12-19 ENCOUNTER — ROUTINE PRENATAL (OUTPATIENT)
Dept: OBGYN CLINIC | Facility: CLINIC | Age: 27
End: 2022-12-19

## 2022-12-19 VITALS — WEIGHT: 222.6 LBS | DIASTOLIC BLOOD PRESSURE: 64 MMHG | BODY MASS INDEX: 39.43 KG/M2 | SYSTOLIC BLOOD PRESSURE: 118 MMHG

## 2022-12-19 DIAGNOSIS — Z34.82 PRENATAL CARE, SUBSEQUENT PREGNANCY, SECOND TRIMESTER: Primary | ICD-10-CM

## 2022-12-19 DIAGNOSIS — G43.009 MIGRAINE WITHOUT AURA AND WITHOUT STATUS MIGRAINOSUS, NOT INTRACTABLE: ICD-10-CM

## 2022-12-19 DIAGNOSIS — Z3A.20 20 WEEKS GESTATION OF PREGNANCY: ICD-10-CM

## 2022-12-19 LAB
SL AMB  POCT GLUCOSE, UA: NORMAL
SL AMB POCT URINE PROTEIN: NORMAL

## 2022-12-19 RX ORDER — ONDANSETRON 4 MG/1
4 TABLET, ORALLY DISINTEGRATING ORAL EVERY 6 HOURS PRN
Qty: 20 TABLET | Refills: 0 | Status: SHIPPED | OUTPATIENT
Start: 2022-12-19

## 2022-12-19 NOTE — PROGRESS NOTES
Feeling FM  Had some round ligament pain that has resolved  Denies VB  Missed AFP  Level II scheduled  Still has occasional vomiting  Zofran refilled  RTO 4 weeks

## 2022-12-20 ENCOUNTER — APPOINTMENT (OUTPATIENT)
Dept: LAB | Facility: CLINIC | Age: 27
End: 2022-12-20

## 2022-12-20 DIAGNOSIS — Z13.0 SCREENING FOR ENDOCRINE/METABOLIC/IMMUNITY DISORDERS: ICD-10-CM

## 2022-12-20 DIAGNOSIS — Z13.29 SCREENING FOR ENDOCRINE/METABOLIC/IMMUNITY DISORDERS: ICD-10-CM

## 2022-12-20 DIAGNOSIS — Z36.3 ENCOUNTER FOR ANTENATAL SCREENING FOR MALFORMATIONS: ICD-10-CM

## 2022-12-20 DIAGNOSIS — Z13.228 SCREENING FOR ENDOCRINE/METABOLIC/IMMUNITY DISORDERS: ICD-10-CM

## 2022-12-20 LAB
CHOLEST SERPL-MCNC: 202 MG/DL
HDLC SERPL-MCNC: 50 MG/DL
LDLC SERPL CALC-MCNC: 114 MG/DL (ref 0–100)
NONHDLC SERPL-MCNC: 152 MG/DL
TRIGL SERPL-MCNC: 191 MG/DL
TSH SERPL DL<=0.05 MIU/L-ACNC: 2.27 UIU/ML (ref 0.45–4.5)

## 2022-12-22 LAB
2ND TRIMESTER 4 SCREEN SERPL-IMP: NORMAL
AFP ADJ MOM SERPL: 1
AFP INTERP AMN-IMP: NORMAL
AFP INTERP SERPL-IMP: NORMAL
AFP INTERP SERPL-IMP: NORMAL
AFP SERPL-MCNC: 49.5 NG/ML
AGE AT DELIVERY: 29.7 YR
GA METHOD: NORMAL
GA: 20.4 WEEKS
IDDM PATIENT QL: NO
MULTIPLE PREGNANCY: NO
NEURAL TUBE DEFECT RISK FETUS: NORMAL %

## 2022-12-27 ENCOUNTER — DOCUMENTATION (OUTPATIENT)
Facility: HOSPITAL | Age: 27
End: 2022-12-27

## 2022-12-27 NOTE — PROGRESS NOTES
PT arrived for 1300 appointment @ [phone]  Attempted to contact pt at 1312 to inquire on pts location-  Was informed that they were at office and they hung up the phone  Attempted to reschedule appointment with pts caregiver, care giver informed us that she will attempt to get the pt to our [facility] office tomorrow 12/28/2022 @ 0800  Caregiver declined further needs at this time   and practice administrators business cards provided to caregiver

## 2022-12-27 NOTE — PROGRESS NOTES
Please refer to the [facility] ultrasound report in Ob Procedures for additional information regarding today's visit

## 2022-12-28 ENCOUNTER — ROUTINE PRENATAL (OUTPATIENT)
Facility: HOSPITAL | Age: 27
End: 2022-12-28

## 2022-12-28 VITALS
SYSTOLIC BLOOD PRESSURE: 118 MMHG | DIASTOLIC BLOOD PRESSURE: 62 MMHG | BODY MASS INDEX: 38.84 KG/M2 | HEART RATE: 101 BPM | HEIGHT: 63 IN | WEIGHT: 219.2 LBS

## 2022-12-28 DIAGNOSIS — O99.212 OBESITY AFFECTING PREGNANCY IN SECOND TRIMESTER: Primary | ICD-10-CM

## 2022-12-28 DIAGNOSIS — Z3A.21 21 WEEKS GESTATION OF PREGNANCY: ICD-10-CM

## 2022-12-28 DIAGNOSIS — Z36.86 ENCOUNTER FOR ANTENATAL SCREENING FOR CERVICAL LENGTH: ICD-10-CM

## 2022-12-28 NOTE — PROGRESS NOTES
Ultrasound Probe Disinfection    A transvaginal ultrasound was performed  Prior to use, disinfection was performed with High Level Disinfection Process ([employer])  Probe serial number [id number]: [id number] was used        [facility]  12/28/22  8:16 AM

## 2022-12-28 NOTE — LETTER
December 28, 2022     Nayan Washington, [address]    Patient: Mirtha Dowling   YOB: 1995   Date of Visit: 12/28/2022       Dear Dr Anselmo Pemberton: Thank you for referring Mirtha Dowling to me for evaluation  Below are my notes for this consultation  If you have questions, please do not hesitate to call me  I look forward to following your patient along with you           Sincerely,        Jeffrey Hinson MD        CC: No Recipients  Jeffrey Hinson MD  12/27/2022  6:41 PM  Sign when Signing Visit  Please refer to the [facility] ultrasound report in Ob Procedures for additional information regarding today's visit

## 2023-01-17 ENCOUNTER — TELEPHONE (OUTPATIENT)
Dept: PSYCHIATRY | Facility: CLINIC | Age: 28
End: 2023-01-17

## 2023-01-17 PROBLEM — G44.209 TENSION HEADACHE: Status: RESOLVED | Noted: 2018-10-09 | Resolved: 2023-01-17

## 2023-01-17 PROBLEM — Z12.4 CERVICAL CANCER SCREENING: Status: RESOLVED | Noted: 2020-07-30 | Resolved: 2023-01-17

## 2023-01-17 PROBLEM — J02.9 SORE THROAT: Status: RESOLVED | Noted: 2022-05-25 | Resolved: 2023-01-17

## 2023-01-17 PROBLEM — R10.2 PELVIC PAIN: Status: RESOLVED | Noted: 2021-09-16 | Resolved: 2023-01-17

## 2023-01-17 PROBLEM — Z30.09 ENCOUNTER FOR COUNSELING REGARDING CONTRACEPTION: Status: RESOLVED | Noted: 2018-05-08 | Resolved: 2023-01-17

## 2023-01-17 PROBLEM — Z3A.24 24 WEEKS GESTATION OF PREGNANCY: Status: ACTIVE | Noted: 2022-09-23

## 2023-01-17 PROBLEM — Z20.822 EXPOSURE TO COVID-19 VIRUS: Status: RESOLVED | Noted: 2021-02-19 | Resolved: 2023-01-17

## 2023-01-17 NOTE — TELEPHONE ENCOUNTER
Sent Wait List Letter VIA [employer]   Verify patients need of services from wait list after 10 days   If no communication remove from wait list

## 2023-01-17 NOTE — PATIENT INSTRUCTIONS
Pregnancy at 32 to 30 Weeks   AMBULATORY CARE:   What changes are happening to your body: You may notice new symptoms such as shortness of breath, heartburn, or swelling of your ankles and feet  You may also have trouble sleeping or contractions  Seek care immediately if:   You develop a severe headache that does not go away  You have new or increased vision changes, such as blurred or spotted vision  You have new or increased swelling in your face or hands  You have vaginal spotting or bleeding  Your water broke or you feel warm water gushing or trickling from your vagina  Call your doctor or obstetrician if:   You have more than 5 contractions in 1 hour  You notice any changes in your baby's movements  You have abdominal cramps, pressure, or tightening  You have a change in vaginal discharge  You have chills or a fever  You have vaginal itching, burning, or pain  You have yellow, green, white, or foul-smelling vaginal discharge  You have pain or burning when you urinate, less urine than usual, or pink or bloody urine  You have questions or concerns about your condition or care  How to care for yourself at this stage of your pregnancy:       Eat a variety of healthy foods  Healthy foods include fruits, vegetables, whole-grain breads, low-fat dairy foods, beans, lean meats, and fish  Drink liquids as directed  Ask how much liquid to drink each day and which liquids are best for you  Limit caffeine to less than 200 milligrams each day  Limit your intake of fish to 2 servings each week  Choose fish low in mercury such as canned light tuna, shrimp, salmon, cod, or tilapia  Do not  eat fish high in mercury such as swordfish, tilefish, bessy mackerel, and shark  Manage heartburn  by eating 4 or 5 small meals each day instead of large meals  Avoid spicy food  Manage swelling  by lying down and putting your feet up  Take prenatal vitamins as directed  Your need for certain vitamins and minerals, such as folic acid, increases during pregnancy  Prenatal vitamins provide some of the extra vitamins and minerals you need  Prenatal vitamins may also help to decrease the risk of certain birth defects  Talk to your healthcare provider about exercise  Moderate exercise can help you stay fit  Your healthcare provider will help you plan an exercise program that is safe for you during pregnancy  Do not smoke  Smoking increases your risk of a miscarriage and other health problems during your pregnancy  Smoking can cause your baby to be born too early or weigh less at birth  Ask your healthcare provider for information if you need help quitting  Do not drink alcohol  Alcohol passes from your body to your baby through the placenta  It can affect your baby's brain development and cause fetal alcohol syndrome (FAS)  FAS is a group of conditions that causes mental, behavior, and growth problems  Talk to your healthcare provider before you take any medicines  Many medicines may harm your baby if you take them when you are pregnant  Do not take any medicines, vitamins, herbs, or supplements without first talking to your healthcare provider  Never use illegal or street drugs (such as marijuana or cocaine) while you are pregnant  Safety tips during pregnancy:   Avoid hot tubs and saunas  Do not use a hot tub or sauna while you are pregnant, especially during your first trimester  Hot tubs and saunas may raise your baby's temperature and increase the risk of birth defects  Avoid toxoplasmosis  This is an infection caused by eating raw meat or being around infected cat feces  It can cause birth defects, miscarriages, and other problems  Wash your hands after you touch raw meat  Make sure any meat is well-cooked before you eat it  Avoid raw eggs and unpasteurized milk   Use gloves or ask someone else to clean your cat's litter box while you are pregnant  Changes that are happening with your baby:  By 30 weeks, your baby may weigh more than 3 pounds  Your baby may be about 11 inches long from the top of the head to the rump (baby's bottom)  Your baby's eyes open and close now  Your baby's kicks and movements are more forceful at this time  What you need to know about prenatal care: Your healthcare provider will check your blood pressure and weight  You may also need the following:  Blood tests  may be done to check for anemia or blood type  A urine test  may also be done to check for sugar and protein  These can be signs of gestational diabetes or infection  Protein in your urine may also be a sign of preeclampsia  Preeclampsia is a condition that can develop during week 20 or later of your pregnancy  It causes high blood pressure, and it can cause problems with your kidneys and other organs  A Tdap vaccine and flu vaccine  may be recommended by your healthcare provider  A gestational diabetes screen  may be done  Your healthcare provider may order either a 1-step or 2-step oral glucose tolerance test (OGTT)  1-step OGTT:  Your blood sugar level will be tested after you have not eaten for 8 hours (fasting)  You will then be given a glucose drink  Your level will be tested again 1 hour and 2 hours after you finish the drink  2-step OGTT:  You do not have to fast for the first part of the test  You will have the glucose drink at any time of day  Your blood sugar level will be checked 1 hour later  If your blood sugar is higher than a certain level, another test will be ordered  You will fast and your blood sugar level will be tested  You will have the glucose drink  Your blood will be tested again 1 hour, 2 hours, and 3 hours after you finish the glucose drink  Fundal height  is a measurement of your uterus to check your baby's growth  This number is usually the same as the number of weeks that you have been pregnant   Your healthcare provider may also check your baby's position  Your baby's heart rate  will be checked  Follow up with your doctor or obstetrician as directed:  Write down your questions so you remember to ask them during your visits  © Copyright [employer] 2022 Information is for End User's use only and may not be sold, redistributed or otherwise used for commercial purposes  All illustrations and images included in CareNotes® are the copyrighted property of A D A M , Inc  or William Culp   The above information is an  only  It is not intended as medical advice for individual conditions or treatments  Talk to your doctor, nurse or pharmacist before following any medical regimen to see if it is safe and effective for you  Kick Counts in Pregnancy   AMBULATORY CARE:   Kick counts  measure how much your baby is moving in your womb  A kick from your baby can be felt as a twist, turn, swish, roll, or jab  It is common to feel your baby kicking at 26 to 28 weeks of pregnancy  You may feel your baby kick as early as 20 weeks of pregnancy  You may want to start counting at 28 weeks  Contact your doctor immediately if:   You feel a change in the number of kicks or movements of your baby  You feel fewer than 10 kicks within 2 hours  You have questions or concerns about your baby's movements  Why measure kick counts:  Your baby's movement may provide information about your baby's health  He or she may move less, or not at all, if there are problems  Your baby may move less if he or she is not getting enough oxygen or nutrition from the placenta  Do not smoke while you are pregnant  Smoking decreases the amount of oxygen that gets to your baby  Talk to your healthcare provider if you need help to quit smoking  Tell your healthcare provider as soon as you feel a change in your baby's movements  When to measure kick counts:   Measure kick counts at the same time every day        Measure kick counts when your baby is awake and most active  Your baby may be most active in the evening  How to measure kick counts:  Check that your baby is awake before you measure kick counts  You can wake up your baby by lightly pushing on your belly, walking, or drinking something cold  Your healthcare provider may tell you different ways to measure kick counts  You may be told to do the following:  Use a chart or clock to keep track of the time you start and finish counting  Sit in a chair or lie on your left side  Place your hands on the largest part of your belly  Count until you reach 10 kicks  Write down how much time it takes to count 10 kicks  It may take 30 minutes to 2 hours to count 10 kicks  It should not take more than 2 hours to count 10 kicks  Follow up with your doctor as directed:  Write down your questions so you remember to ask them during your visits  © Copyright [employer] 2022 Information is for End User's use only and may not be sold, redistributed or otherwise used for commercial purposes  All illustrations and images included in CareNotes® are the copyrighted property of A D A M , Inc  or [employer]   The above information is an  only  It is not intended as medical advice for individual conditions or treatments  Talk to your doctor, nurse or pharmacist before following any medical regimen to see if it is safe and effective for you  Kevon Quiñones Contractions   AMBULATORY CARE:   Sandip Henry contractions  are tightening and squeezing of the muscles of your uterus (womb) during pregnancy  The uterine muscles control the uterus  Placentia Quiñones contractions stop on their own  They are not true labor contractions and do not cause your cervix (opening to your uterus) to dilate (open)    Common symptoms include the following:   Pain or discomfort in your groin or lower abdomen that comes and goes    Your contractions are short, and do not last longer each time they happen    Your contractions do not get closer together each time    Your contractions do not get stronger or more painful each time    Your contractions stop when you change your position or rest    Seek care immediately if:   You have bleeding from your vagina  You have fluid leaking from your vagina that does not stop  You feel a gush of fluid from your vagina  Your contractions happen every 5 minutes or sooner, and last for more than 60 seconds  Your contractions begin to feel stronger or more painful  You feel a change in your baby's movement, or you feel fewer than 6 to 10 movements in an hour  Call your doctor or obstetrician if:   You have a fever  You have questions or concerns about your condition or care  Treatment for Kevon Quiñones contractions  may include pain medicine to relieve discomfort or pain or sedatives to relax the muscles of your uterus  If you are dehydrated, he or she may give you fluids through an IV or tell you to drink liquids  Self-care:   Change your activity or your position  when you feel contractions begin  Walk if you have been lying or sitting  Lie down if you have been standing or walking  True labor will not stop by changing your position or activity  Take a warm bath  to relax your body  Drink more liquids  to prevent dehydration  Ask how much liquid to drink each day and which liquids are best for you  Practice your labor breathing  to decrease your discomfort  This may help you get ready for true labor  Take slow, deep breaths, or fast, short breaths  Ask your healthcare provider how to practice labor breathing  Follow up with your doctor or obstetrician as directed:  Write down your questions so you remember to ask them during your visits  © Copyright [employer] 2022 Information is for End User's use only and may not be sold, redistributed or otherwise used for commercial purposes   All illustrations and images included in [employer]os are the copyrighted property of A D A M , Inc  or [employer]  The above information is an  only  It is not intended as medical advice for individual conditions or treatments  Talk to your doctor, nurse or pharmacist before following any medical regimen to see if it is safe and effective for you  Round Ligament Pain   WHAT YOU NEED TO KNOW:   What is round ligament pain? Round ligament pain is caused when ligaments are stretched as your uterus (womb) gets bigger during pregnancy  Round ligaments are found on each side of your uterus  They are bands of tissue that hold the uterus in place  Round ligament pain happens most often during the second trimester  It is a normal part of pregnancy and should stop by the third trimester  The pain is not serious and will not hurt your baby  What are the signs and symptoms of round ligament pain? Pain on one or both sides of your lower abdomen or groin that may move up to your hip    Spasms in the muscles in your abdomen    Pain that lasts a few seconds    Pain that happens when you exercise, sneeze, change positions, or stand quickly    How is round ligament pain diagnosed? Your healthcare provider will examine you and ask about your pain  Tell the provider when the pain started, and if you feel it on one or both sides  Your provider may ask if anything helps the pain or makes it worse  What can I do to manage my pain? Round ligament pain does not need to be treated  The following may help make you more comfortable:  Rest as often as you can  Rest can help relieve round ligament pain  You might want to lie on the side that has pain  Place a pillow under your abdomen  Keep another pillow between your knees  Move slowly  Sudden movement can stretch the ligaments and cause pain  Stand, sit, and change positions slowly  Try to tighten the muscles in your hips before you sneeze or laugh   You can also sit down and bring your knees up toward your abdomen  This can help relieve tension on the ligaments  Exercise as directed  Gentle exercise can keep the ligaments loose and strengthen core (abdominal) muscles  An example is swimming, or a yoga program designed for pregnancy  Ask your healthcare provider which exercises are safe for you and how often to exercise  For most healthy women, a good goal is to try to get at least 30 minutes of exercise every day  If activity causes pain, try not to walk too long or too far at one time  Break your exercise up into short amounts  Apply a warm compress to the area  Warmth can relieve pain and muscle spasms  Ask your healthcare provider if you can take a warm bath or use a heating pad  Keep all heat settings low  High heat can be dangerous for your baby  Do not sit in a hot tub or use hot water in your bath  You may also be able to massage the area gently while you are applying heat  Massage can help relieve pain  Ask about pain medicines  Ask your healthcare provider before you take any medicine during pregnancy, including over-the-counter pain medicines  Your healthcare provider may recommend acetaminophen to relieve the pain  Ask how much to take and how often to take it  Follow directions  Acetaminophen can cause liver damage  Too much medicine can be harmful to your baby  When should I contact my healthcare provider? You have pain that is spreading to other parts of your body  You have new or worsening pain  You have pain that lasts longer than a few minutes at a time  You have questions or concerns about your condition or care  CARE AGREEMENT:   You have the right to help plan your care  Learn about your health condition and how it may be treated  Discuss treatment options with your healthcare providers to decide what care you want to receive  You always have the right to refuse treatment  The above information is an  only   It is not intended as medical advice for individual conditions or treatments  Talk to your doctor, nurse or pharmacist before following any medical regimen to see if it is safe and effective for you  © Copyright [employer] 2022 Information is for End User's use only and may not be sold, redistributed or otherwise used for commercial purposes   All illustrations and images included in CareNotes® are the copyrighted property of A CHELSEA A M , Inc  or [address] [place] [id number]

## 2023-01-17 NOTE — ASSESSMENT & PLAN NOTE
Mary Fonseca is a 32 y o   24w5d  TWG 1 361 kg (3 lb)   28 week labs ordered pt advised when to have drawn 23  Feels well  Denies LOF/CTX/VB  No concerns  AFP completed  Vaccines discussed    labor precautions reviewed  Encouraged adequate hydration and nutrition  Pregnancy Essential guide and Baby and Me center web site recommended

## 2023-01-18 NOTE — PROGRESS NOTES
24w4d  Pap 10/24/2022 Negative   GC/CT: 10/24/2022  Negative / Negative   PN1 Labs: 10/24/2022  Early glucose was abnormal @144   Patient has a 3 hour glucose and results  were negative  Blood Type: A  Positive:   MFM Level 1: 2022  MFM Level 2: 2022  AFP: 2022  28 Week Labs: Given at today's visit 2023  TDap:  Flu:2022  GBS:   Blue Folder: given   Yellow Folder:  Ped Referral : Given at today's visit 2023  Breast pump:  L&D forms:  Delivery consent:     Patient reports positive fetal movements with no lost of fluids and no contractions at this time

## 2023-01-19 ENCOUNTER — ROUTINE PRENATAL (OUTPATIENT)
Dept: OBGYN CLINIC | Facility: CLINIC | Age: 28
End: 2023-01-19

## 2023-01-19 VITALS
BODY MASS INDEX: 39.51 KG/M2 | SYSTOLIC BLOOD PRESSURE: 118 MMHG | DIASTOLIC BLOOD PRESSURE: 76 MMHG | WEIGHT: 223 LBS | HEIGHT: 63 IN

## 2023-01-19 DIAGNOSIS — Z34.02 PREGNANCY, SUPERVISION OF FIRST, SECOND TRIMESTER: Primary | ICD-10-CM

## 2023-01-19 LAB
SL AMB  POCT GLUCOSE, UA: NORMAL
SL AMB POCT URINE PROTEIN: NORMAL

## 2023-01-19 NOTE — PROGRESS NOTES
Problem   24 Weeks Gestation of Pregnancy    Blood Type: A   +     Pap 10/24/2022  Neg  GC/CT Neg  Taking ASA, PNV, Zoloft, and other meds listed in med section   PN1 Labs: completed, 1hr Glucola abnormal, 3hr normal   H&H: 12  6  28 Week Labs:  AFP:completed Neg  Level 1:  Level 2:  Tdap:  Flu:   GBS swab:     Blue folder:  Yellow folder:     Breast pump order:  L&D forms:    Delivery consent:   IOL:              24 weeks gestation of pregnancy  Nasrin Ross is a 32 y o   24w5d  TWG 1 361 kg (3 lb)   28 week labs ordered pt advised when to have drawn 23  Feels well  Denies LOF/CTX/VB  No concerns  AFP completed  Vaccines discussed    labor precautions reviewed  Encouraged adequate hydration and nutrition  Pregnancy Essential guide and Baby and Me center web site recommended

## 2023-01-20 NOTE — PATIENT INSTRUCTIONS
Thank you for choosing us for your  care today  If you have any questions about your ultrasound or care, please do not hesitate to contact us or your primary obstetrician  Some general instructions for your pregnancy are:    Exercise: Aim for 22 minutes per day (150 minutes per week) of regular exercise  Walking is great! Nutrition: Choose healthy sources of calcium, iron, and protein  Learn about Preeclampsia: preeclampsia is a common, serious high blood pressure complication in pregnancy  A blood pressure of 678CKTE (systolic or top number) or 58NFPT (diastolic or bottom number) is not normal and needs evaluation by your doctor  Aspirin is sometimes prescribed in early pregnancy to prevent preeclampsia in women with risk factors - ask your obstetrician if you should be on this medication  For more resources, visit:  https://mothertobaby org/fact-sheets/low-dose-aspirin/  [employer] com cy  https://www highriskpregnancyinfo org/preeclampsia  If you smoke, try to reduce how many cigarettes you smoke or try to quit completely  Do not vape  Other warning signs to watch out for in pregnancy or postpartum: chest pain, obstructed breathing or shortness of breath, seizures, thoughts of hurting yourself or your baby, bleeding, a painful or swollen leg, fever, or headache (see [employer] POST-BIRTH Warning Signs campaign)  If these happen call 911  Itching is also not normal in pregnancy and if you experience this, especially over your hands and feet, potentially worse at night, notify your doctors

## 2023-01-23 ENCOUNTER — TELEPHONE (OUTPATIENT)
Dept: OBGYN CLINIC | Facility: CLINIC | Age: 28
End: 2023-01-23

## 2023-01-23 DIAGNOSIS — F33.9 EPISODE OF RECURRENT MAJOR DEPRESSIVE DISORDER, UNSPECIFIED DEPRESSION EPISODE SEVERITY (HCC): Primary | ICD-10-CM

## 2023-01-23 PROBLEM — R30.0 DYSURIA: Status: RESOLVED | Noted: 2020-05-21 | Resolved: 2023-01-23

## 2023-01-23 PROBLEM — Z3A.25 25 WEEKS GESTATION OF PREGNANCY: Status: ACTIVE | Noted: 2022-09-23

## 2023-01-23 PROBLEM — R19.7 DIARRHEA: Status: RESOLVED | Noted: 2020-11-20 | Resolved: 2023-01-23

## 2023-01-23 NOTE — TELEPHONE ENCOUNTER
Pt mother calling req refill of Zoloft - pt has none left    I did reach out a few weeks back to try to schedule a med check with Dr Stuart Montes but was not able to connect with the patient  Can you advise what to do for transitional care, should she follow up with someone in your office? Should we see about one of the [facility] providers authorizing refills? Please let me know!

## 2023-01-24 ENCOUNTER — ULTRASOUND (OUTPATIENT)
Facility: HOSPITAL | Age: 28
End: 2023-01-24

## 2023-01-24 VITALS
HEIGHT: 63 IN | HEART RATE: 103 BPM | WEIGHT: 217.2 LBS | BODY MASS INDEX: 38.48 KG/M2 | DIASTOLIC BLOOD PRESSURE: 60 MMHG | SYSTOLIC BLOOD PRESSURE: 104 MMHG

## 2023-01-24 DIAGNOSIS — Z3A.25 25 WEEKS GESTATION OF PREGNANCY: ICD-10-CM

## 2023-01-24 DIAGNOSIS — O99.212 OBESITY AFFECTING PREGNANCY IN SECOND TRIMESTER: Primary | ICD-10-CM

## 2023-01-24 DIAGNOSIS — Z36.2 ENCOUNTER FOR FOLLOW-UP ULTRASOUND OF FETAL ANATOMY: ICD-10-CM

## 2023-01-24 PROBLEM — E66.813 CLASS 3 SEVERE OBESITY IN ADULT (HCC): Status: RESOLVED | Noted: 2020-07-30 | Resolved: 2023-01-24

## 2023-01-24 PROBLEM — E66.01 CLASS 3 SEVERE OBESITY IN ADULT (HCC): Status: RESOLVED | Noted: 2020-07-30 | Resolved: 2023-01-24

## 2023-01-24 RX ORDER — SERTRALINE HYDROCHLORIDE 100 MG/1
100 TABLET, FILM COATED ORAL DAILY
Qty: 30 TABLET | Refills: 2 | Status: SHIPPED | OUTPATIENT
Start: 2023-01-24

## 2023-01-24 NOTE — LETTER
January 24, 2023     [facility]  [address]    Patient: Vi Moreira   YOB: 1995   Date of Visit: 1/24/2023       Dear Dr Loyd Briscoe: Thank you for referring Vi Moreira to me for evaluation  Below are my notes for this consultation  If you have questions, please do not hesitate to call me  I look forward to following your patient along with you  Sincerely,        Inga Buerger, MD        CC: No Recipients  Inga Buerger, MD  1/24/2023 11:55 AM  Sign when Signing Visit  [address]: Vi Moreira was seen today at 25w3d for followup missed anatomy ultrasound  See ultrasound report under "OB Procedures" tab    Please don't hesitate to contact our office with any concerns or questions   -Inga Buerger, MD

## 2023-01-24 NOTE — PROGRESS NOTES
[address]: Aljens Samuels was seen today at 25w3d for followup missed anatomy ultrasound  See ultrasound report under "OB Procedures" tab    Please don't hesitate to contact our office with any concerns or questions   -Zuly Cotton MD

## 2023-02-01 PROCEDURE — T1002 RN SERVICES UP TO 15 MINUTES: HCPCS

## 2023-02-02 ENCOUNTER — OFFICE VISIT (OUTPATIENT)
Dept: FAMILY MEDICINE CLINIC | Facility: CLINIC | Age: 28
End: 2023-02-02

## 2023-02-02 VITALS
OXYGEN SATURATION: 89 % | HEART RATE: 85 BPM | WEIGHT: 221.8 LBS | SYSTOLIC BLOOD PRESSURE: 112 MMHG | DIASTOLIC BLOOD PRESSURE: 77 MMHG | RESPIRATION RATE: 16 BRPM | TEMPERATURE: 98 F | HEIGHT: 64 IN | BODY MASS INDEX: 37.87 KG/M2

## 2023-02-02 DIAGNOSIS — Z00.00 MEDICARE ANNUAL WELLNESS VISIT, SUBSEQUENT: Primary | ICD-10-CM

## 2023-02-02 NOTE — PROGRESS NOTES
Assessment and Plan:     Problem List Items Addressed This Visit        Other    Medicare annual wellness visit, subsequent - Primary     32 y o female here for MAW(subsequent)  Patient is 25w6d pregnant  Mood is stable  Overall doing well  UTD on all age appropriate screenings   - NILM on Pap 10/24/22    Note- SPO2 89%, same on recheck  Appears in NAD, has thick artificial painted nails  Preventive health issues were discussed with patient, and age appropriate screening tests were ordered as noted in patient's After Visit Summary  Personalized health advice and appropriate referrals for health education or preventive services given if needed, as noted in patient's After Visit Summary       History of Present Illness:     Patient presents for a Medicare Wellness Visit    HPI   Patient Care Team:  Scot Louis MD as PCP - General (Family Medicine)  Asael Mckeon MD (Obstetrics and Gynecology)  Augustin Palomo MD (Obstetrics and Gynecology)  Sahara Pandya MD (Obstetrics and Gynecology)  Bassem Jenkins MD (Obstetrics and Gynecology)  Jimena Murrieta MD (Obstetrics and Gynecology)  Aline Martines MD (Obstetrics and Gynecology)  Chasity Gambino MD (Obstetrics and Gynecology)  LIZ Oh (Obstetrics and Gynecology)  Preethi Valero PA-C as Physician Assistant (Physician Assistant)  Fe Mera PA-C as Physician Assistant (Physician Assistant)  Melquiades Osullivan as Nurse Practitioner (Obstetrics and Gynecology)  Elizabeth Lopez as Nurse Practitioner (Nurse Practitioner)  Jeremiah Vital (Obstetrics and Gynecology)  Misa Medina MD (Maternal and Fetal Medicine)  Salvador Maguire MD (Maternal and Fetal Medicine)  Peng Escalante MD (Maternal and Fetal Medicine)  Bette Miranda MD (Maternal and Fetal Medicine)  Sabi Terry MD (Maternal and Fetal Medicine)  Felicita Jaquez MD (Maternal and Fetal Medicine)  Sean Sarabia MD (Maternal and Fetal Medicine)  Richie Schuster MD (Maternal and Fetal Medicine)  LIZ Mclaughlin as Nurse Practitioner (Nurse Practitioner)     Review of Systems:     Review of Systems     Problem List:     Patient Active Problem List   Diagnosis   • Fetal alcohol syndrome   • GERD (gastroesophageal reflux disease)   • Mild intellectual disability   • Obesity affecting pregnancy   • Major depression, recurrent, chronic ([facility] )   • Medicare annual wellness visit, subsequent   • Trichomoniasis   • Migraine without aura, not intractable   • Anxiety and depression   • Acute thoracic myofascial strain   • Chronic midline low back pain without sciatica   • Urinary frequency   • 25 weeks gestation of pregnancy   • Major depressive disorder, recurrent 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 psychotic features ([facility] )   • PTSD (post-traumatic stress disorder)   • Obesity affecting pregnancy in second trimester      Past Medical and Surgical History:     Past Medical History:   Diagnosis Date   • Altered level of consciousness 2014   • Anxiety    • Depression    • Intellectual disability     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   • Personality disorder (HCC)      Past Surgical History:   Procedure Laterality Date   • NO PAST SURGERIES        Family History:     Family History   Adopted: Yes   Problem Relation Age of Onset   • Intellectual disability Sister    • Intellectual disability Brother    • Intellectual disability Brother       Social History:     Social History     Socioeconomic History   • Marital status: Single     Spouse name: None   • Number of children: None   • Years of education: None   • Highest education level: None   Occupational History   • None   Tobacco Use   • Smoking status: Never   • Smokeless tobacco: Never   Vaping Use   • Vaping Use: Never used   Substance and Sexual Activity   • Alcohol use: None     Comment: not since confiemed preg   • Drug use: Never   • Sexual activity: Yes     Partners: Male     Birth control/protection: None   Other Topics Concern   • None   Social History Narrative   • None     Social Determinants of Health     Financial Resource Strain: Low Risk    • Difficulty of Paying Living Expenses: Not hard at all   Food Insecurity: No Food Insecurity   • Worried About Running Out of Food in the Last Year: Never true   • Ran Out of Food in the Last Year: Never true   Transportation Needs: No Transportation Needs   • Lack of Transportation (Medical): No   • Lack of Transportation (Non-Medical): No   Physical Activity: Insufficiently Active   • Days of Exercise per Week: 1 day   • Minutes of Exercise per Session: 60 min   Stress: No Stress Concern Present   • Feeling of Stress :  Only a little   Social Connections: Not on file   Intimate Partner Violence: Not At Risk   • Fear of Current or Ex-Partner: No   • Emotionally Abused: No   • Physically Abused: No   • Sexually Abused: No   Housing Stability: Low Risk    • Unable to Pay for Housing in the Last Year: No   • Number of Places Lived in the Last Year: 1   • Unstable Housing in the Last Year: No      Medications and Allergies:     Current Outpatient Medications   Medication Sig Dispense Refill   • aspirin 81 mg chewable tablet Chew 2 tablets (162 mg total) daily 180 tablet 1   • ondansetron (ZOFRAN-ODT) 4 mg disintegrating tablet Take 1 tablet (4 mg total) by mouth every 6 (six) hours as needed for nausea or vomiting 20 tablet 0   • prenatal vitamin (CLASSIC FORMULA) 27-0 8 mg Take 1 tablet by mouth daily at bedtime 90 tablet 3   • sertraline (ZOLOFT) 100 mg tablet Take 1 tablet (100 mg total) by mouth daily 30 tablet 2   • SUMAtriptan (IMITREX) 25 mg tablet Take 2 tablets (50 mg total) by mouth once as needed for migraine for up to 1 dose 60 tablet 2   • hydrOXYzine HCL (ATARAX) 25 mg tablet Take 1 tablet (25 mg total) by mouth every 6 (six) hours as needed for anxiety (Patient not taking: Reported on 2/2/2023) 30 tablet 2   • psyllium (METAMUCIL SMOOTH TEXTURE) 28 % packet Take 1 packet by mouth 2 (two) times a day for 10 days 20 packet 0     No current facility-administered medications for this visit  No Known Allergies   Immunizations:     Immunization History   Administered Date(s) Administered   • BCG 1993   • DTP 1993, 01/31/1994, 03/21/1994, 11/13/1995   • DTaP 08/09/1999   • HPV Quadrivalent 08/22/2016, 02/28/2017   • HPV9 10/24/2016   • Hep B, Adolescent or Pediatric 07/06/1999   • Hep B, adult 11/24/2020, 01/26/2021   • INFLUENZA 10/14/1999, 11/15/1999   • Influenza Quadrivalent, 6-35 Months IM 10/24/2016   • Influenza, injectable, quadrivalent, preservative free 0 5 mL 10/09/2018, 09/22/2020, 11/23/2022   • Influenza, recombinant, quadrivalent,injectable, preservative free 10/28/2021   • Influenza, seasonal, injectable 12/08/2014   • MMR 07/06/1999, 08/09/1999   • Measles 08/23/1994   • OPV 1993, 01/31/1994, 03/21/1994, 11/22/1994, 01/30/1995, 09/29/1995, 11/13/1995   • Rabies-IM Human Diploid Cell Culture 12/10/2020   • Tdap 11/30/2021   • Varicella 07/06/1999      Health Maintenance:         Topic Date Due   • Cervical Cancer Screening  10/24/2025   • HIV Screening  Completed   • Hepatitis C Screening  Completed     There are no preventive care reminders to display for this patient  Medicare Screening Tests and Risk Assessments:     Rebecca Jarvis is here for her Subsequent Wellness visit  Last Medicare Wellness visit information reviewed, patient interviewed and updates made to the record today  Health Risk Assessment:   Patient rates overall health as very good  Patient feels that their physical health rating is same  Patient is very satisfied with their life  Eyesight was rated as same  Hearing was rated as same  Patient feels that their emotional and mental health rating is same  Patients states they are never, rarely angry  Patient states they are often unusually tired/fatigued  Pain experienced in the last 7 days has been none  Patient states that she has experienced no weight loss or gain in last 6 months  Fall Risk Screening: In the past year, patient has experienced: no history of falling in past year      Urinary Incontinence Screening:   Patient has not leaked urine accidently in the last six months  Home Safety:  Patient does not have trouble with stairs inside or outside of their home  Patient has working smoke alarms and has working carbon monoxide detector  Home safety hazards include: none  Nutrition:   Current diet is Regular  Medications:   Patient is not currently taking any over-the-counter supplements  Patient is able to manage medications  Activities of Daily Living (ADLs)/Instrumental Activities of Daily Living (IADLs):   Walk and transfer into and out of bed and chair?: Yes  Dress and groom yourself?: Yes    Bathe or shower yourself?: Yes    Feed yourself?  Yes  Do your laundry/housekeeping?: Yes  Manage your money, pay your bills and track your expenses?: Yes  Make your own meals?: Yes    Do your own shopping?: Yes    Previous Hospitalizations:   Any hospitalizations or ED visits within the last 12 months?: No      Advance Care Planning:   Living will: No    Durable POA for healthcare: No    Advanced directive: No      Cognitive Screening:   Provider or family/friend/caregiver concerned regarding cognition?: No    PREVENTIVE SCREENINGS      Cardiovascular Screening:    General: Screening Current      Diabetes Screening:     General: Screening Current      Colorectal Cancer Screening:     General: Screening Not Indicated      Breast Cancer Screening:     General: Screening Not Indicated      Cervical Cancer Screening:    General: Screening Current      Osteoporosis Screening:    General: Screening Not Indicated      Abdominal Aortic Aneurysm (AAA) Screening:        General: Screening Not Indicated      Lung Cancer Screening:     General: Screening Not Indicated      Hepatitis C Screening:    General: Screening Current    Screening, Brief Intervention, and Referral to Treatment (SBIRT)    Screening  Typical number of drinks in a day: 0  Typical number of drinks in a week: 0  Interpretation: Low risk drinking behavior  Single Item Drug Screening:  How often have you used an illegal drug (including marijuana) or a prescription medication for non-medical reasons in the past year? never    Single Item Drug Screen Score: 0  Interpretation: Negative screen for possible drug use disorder    Other Counseling Topics:   Car/seat belt/driving safety, skin self-exam, sunscreen and regular weightbearing exercise  No results found  Physical Exam:     /77   Pulse 85   Temp 98 °F (36 7 °C)   Resp 16   Ht 5' 3 5" (1 613 m)   Wt 101 kg (221 lb 12 8 oz)   LMP 07/30/2022 (Exact Date)   SpO2 (!) 89%   BMI 38 67 kg/m²     Physical Exam  Constitutional:       Appearance: She is obese  HENT:      Head: Normocephalic and atraumatic  Eyes:      Extraocular Movements: Extraocular movements intact  Cardiovascular:      Rate and Rhythm: Normal rate  Pulmonary:      Effort: Pulmonary effort is normal  No respiratory distress  Breath sounds: No wheezing or rales  Abdominal:      Comments: Gravid uterus   Musculoskeletal:      Cervical back: Neck supple  Right lower leg: No edema  Left lower leg: No edema  Skin:     General: Skin is warm and dry  Neurological:      General: No focal deficit present     Psychiatric:         Mood and Affect: Mood normal           Sara Spears MD

## 2023-02-02 NOTE — ASSESSMENT & PLAN NOTE
32 y o female here for MAW(subsequent)  Patient is 25w6d pregnant  Mood is stable  Overall doing well  UTD on all age appropriate screenings   - NILM on Pap 10/24/22    Note- SPO2 89%, same on recheck  Appears in NAD, has thick artificial painted nails

## 2023-02-02 NOTE — PATIENT INSTRUCTIONS
Medicare Preventive Visit Patient Instructions  Thank you for completing your Welcome to Medicare Visit or Medicare Annual Wellness Visit today  Your next wellness visit will be due in one year (2/3/2024)  The screening/preventive services that you may require over the next 5-10 years are detailed below  Some tests may not apply to you based off risk factors and/or age  Screening tests ordered at today's visit but not completed yet may show as past due  Also, please note that scanned in results may not display below  Preventive Screenings:  Service Recommendations Previous Testing/Comments   Colorectal Cancer Screening  * Colonoscopy    * Fecal Occult Blood Test (FOBT)/Fecal Immunochemical Test (FIT)  * Fecal DNA/Cologuard Test  * Flexible Sigmoidoscopy Age: 39-70 years old   Colonoscopy: every 10 years (may be performed more frequently if at higher risk)  OR  FOBT/FIT: every 1 year  OR  Cologuard: every 3 years  OR  Sigmoidoscopy: every 5 years  Screening may be recommended earlier than age 39 if at higher risk for colorectal cancer  Also, an individualized decision between you and your healthcare provider will decide whether screening between the ages of 74-80 would be appropriate  Colonoscopy: Not on file  FOBT/FIT: Not on file  Cologuard: Not on file  Sigmoidoscopy: Not on file          Breast Cancer Screening Age: 36 years old  Frequency: every 1-2 years  Not required if history of left and right mastectomy Mammogram: Not on file        Cervical Cancer Screening Between the ages of 21-29, pap smear recommended once every 3 years  Between the ages of 33-67, can perform pap smear with HPV co-testing every 5 years     Recommendations may differ for women with a history of total hysterectomy, cervical cancer, or abnormal pap smears in past  Pap Smear: 10/24/2022        Hepatitis C Screening Once for adults born between 1945 and 1965  More frequently in patients at high risk for Hepatitis C Hep C Antibody: 10/24/2022        Diabetes Screening 1-2 times per year if you're at risk for diabetes or have pre-diabetes Fasting glucose: 79 mg/dL (11/4/2022)  A1C: No results in last 5 years (No results in last 5 years)      Cholesterol Screening Once every 5 years if you don't have a lipid disorder  May order more often based on risk factors  Lipid panel: 12/20/2022          Other Preventive Screenings Covered by Medicare:  1  Abdominal Aortic Aneurysm (AAA) Screening: covered once if your at risk  You're considered to be at risk if you have a family history of AAA  2  Lung Cancer Screening: covers low dose CT scan once per year if you meet all of the following conditions: (1) Age 50-69; (2) No signs or symptoms of lung cancer; (3) Current smoker or have quit smoking within the last 15 years; (4) You have a tobacco smoking history of at least 20 pack years (packs per day multiplied by number of years you smoked); (5) You get a written order from a healthcare provider  3  Glaucoma Screening: covered annually if you're considered high risk: (1) You have diabetes OR (2) Family history of glaucoma OR (3)  aged 48 and older OR (3)  American aged 72 and older  3  Osteoporosis Screening: covered every 2 years if you meet one of the following conditions: (1) You're estrogen deficient and at risk for osteoporosis based off medical history and other findings; (2) Have a vertebral abnormality; (3) On glucocorticoid therapy for more than 3 months; (4) Have primary hyperparathyroidism; (5) On osteoporosis medications and need to assess response to drug therapy  · Last bone density test (DXA Scan): Not on file  5  HIV Screening: covered annually if you're between the age of 12-76  Also covered annually if you are younger than 13 and older than 72 with risk factors for HIV infection  For pregnant patients, it is covered up to 3 times per pregnancy      Immunizations:  Immunization Recommendations   Influenza Vaccine Annual influenza vaccination during flu season is recommended for all persons aged >= 6 months who do not have contraindications   Pneumococcal Vaccine   * Pneumococcal conjugate vaccine = PCV13 (Prevnar 13), PCV15 (Vaxneuvance), PCV20 (Prevnar 20)  * Pneumococcal polysaccharide vaccine = PPSV23 (Pneumovax) Adults 25-60 years old: 1-3 doses may be recommended based on certain risk factors  Adults 72 years old: 1-2 doses may be recommended based off what pneumonia vaccine you previously received   Hepatitis B Vaccine 3 dose series if at intermediate or high risk (ex: diabetes, end stage renal disease, liver disease)   Tetanus (Td) Vaccine - COST NOT COVERED BY MEDICARE PART B Following completion of primary series, a booster dose should be given every 10 years to maintain immunity against tetanus  Td may also be given as tetanus wound prophylaxis  Tdap Vaccine - COST NOT COVERED BY MEDICARE PART B Recommended at least once for all adults  For pregnant patients, recommended with each pregnancy  Shingles Vaccine (Shingrix) - COST NOT COVERED BY MEDICARE PART B  2 shot series recommended in those aged 48 and above     Health Maintenance Due:      Topic Date Due   • Cervical Cancer Screening  10/24/2025   • HIV Screening  Completed   • Hepatitis C Screening  Completed     Immunizations Due:      Topic Date Due   • COVID-19 Vaccine (1) Never done     Advance Directives   What are advance directives? Advance directives are legal documents that state your wishes and plans for medical care  These plans are made ahead of time in case you lose your ability to make decisions for yourself  Advance directives can apply to any medical decision, such as the treatments you want, and if you want to donate organs  What are the types of advance directives? There are many types of advance directives, and each state has rules about how to use them   You may choose a combination of any of the following:  · Living will: This is a written record of the treatment you want  You can also choose which treatments you do not want, which to limit, and which to stop at a certain time  This includes surgery, medicine, IV fluid, and tube feedings  · Durable power of  for healthcare [facility]): This is a written record that states who you want to make healthcare choices for you when you are unable to make them for yourself  This person, called a proxy, is usually a family member or a friend  You may choose more than 1 proxy  · Do not resuscitate (DNR) order:  A DNR order is used in case your heart stops beating or you stop breathing  It is a request not to have certain forms of treatment, such as CPR  A DNR order may be included in other types of advance directives  · Medical directive: This covers the care that you want if you are in a coma, near death, or unable to make decisions for yourself  You can list the treatments you want for each condition  Treatment may include pain medicine, surgery, blood transfusions, dialysis, IV or tube feedings, and a ventilator (breathing machine)  · Values history: This document has questions about your views, beliefs, and how you feel and think about life  This information can help others choose the care that you would choose  Why are advance directives important? An advance directive helps you control your care  Although spoken wishes may be used, it is better to have your wishes written down  Spoken wishes can be misunderstood, or not followed  Treatments may be given even if you do not want them  An advance directive may make it easier for your family to make difficult choices about your care  Weight Management   Why it is important to manage your weight:  Being overweight increases your risk of health conditions such as heart disease, high blood pressure, type 2 diabetes, and certain types of cancer   It can also increase your risk for osteoarthritis, sleep apnea, and other respiratory problems  Aim for a slow, steady weight loss  Even a small amount of weight loss can lower your risk of health problems  How to lose weight safely:  A safe and healthy way to lose weight is to eat fewer calories and get regular exercise  You can lose up about 1 pound a week by decreasing the number of calories you eat by 500 calories each day  Healthy meal plan for weight management:  A healthy meal plan includes a variety of foods, contains fewer calories, and helps you stay healthy  A healthy meal plan includes the following:  · Eat whole-grain foods more often  A healthy meal plan should contain fiber  Fiber is the part of grains, fruits, and vegetables that is not broken down by your body  Whole-grain foods are healthy and provide extra fiber in your diet  Some examples of whole-grain foods are whole-wheat breads and pastas, oatmeal, brown rice, and bulgur  · Eat a variety of vegetables every day  Include dark, leafy greens such as spinach, kale, adrienne greens, and mustard greens  Eat yellow and orange vegetables such as carrots, sweet potatoes, and winter squash  · Eat a variety of fruits every day  Choose fresh or canned fruit (canned in its own juice or light syrup) instead of juice  Fruit juice has very little or no fiber  · Eat low-fat dairy foods  Drink fat-free (skim) milk or 1% milk  Eat fat-free yogurt and low-fat cottage cheese  Try low-fat cheeses such as mozzarella and other reduced-fat cheeses  · Choose meat and other protein foods that are low in fat  Choose beans or other legumes such as split peas or lentils  Choose fish, skinless poultry (chicken or turkey), or lean cuts of red meat (beef or pork)  Before you cook meat or poultry, cut off any visible fat  · Use less fat and oil  Try baking foods instead of frying them  Add less fat, such as margarine, sour cream, regular salad dressing and mayonnaise to foods  Eat fewer high-fat foods   Some examples of high-fat foods include french fries, doughnuts, ice cream, and cakes  · Eat fewer sweets  Limit foods and drinks that are high in sugar  This includes candy, cookies, regular soda, and sweetened drinks  Exercise:  Exercise at least 30 minutes per day on most days of the week  Some examples of exercise include walking, biking, dancing, and swimming  You can also fit in more physical activity by taking the stairs instead of the elevator or parking farther away from stores  Ask your healthcare provider about the best exercise plan for you  © Copyright [employer] 2018 Information is for End User's use only and may not be sold, redistributed or otherwise used for commercial purposes   All illustrations and images included in CareNotes® are the copyrighted property of [employer]  or [address] [place] [address]

## 2023-02-07 ENCOUNTER — HOME HEALTH ADMISSION (OUTPATIENT)
Dept: HOME HEALTH SERVICES | Facility: OTHER | Age: 28
End: 2023-02-07

## 2023-02-07 PROBLEM — Z3A.27 27 WEEKS GESTATION OF PREGNANCY: Status: ACTIVE | Noted: 2022-09-23

## 2023-02-08 ENCOUNTER — APPOINTMENT (OUTPATIENT)
Dept: LAB | Facility: CLINIC | Age: 28
End: 2023-02-08

## 2023-02-08 ENCOUNTER — ROUTINE PRENATAL (OUTPATIENT)
Dept: OBGYN CLINIC | Facility: CLINIC | Age: 28
End: 2023-02-08

## 2023-02-08 VITALS
WEIGHT: 223.4 LBS | BODY MASS INDEX: 38.14 KG/M2 | SYSTOLIC BLOOD PRESSURE: 118 MMHG | DIASTOLIC BLOOD PRESSURE: 74 MMHG | HEIGHT: 64 IN

## 2023-02-08 DIAGNOSIS — Z34.02 PREGNANCY, SUPERVISION OF FIRST, SECOND TRIMESTER: ICD-10-CM

## 2023-02-08 DIAGNOSIS — Z3A.27 27 WEEKS GESTATION OF PREGNANCY: Primary | ICD-10-CM

## 2023-02-08 DIAGNOSIS — Z34.82 ENCOUNTER FOR SUPERVISION OF OTHER NORMAL PREGNANCY, SECOND TRIMESTER: ICD-10-CM

## 2023-02-08 DIAGNOSIS — F70 MILD INTELLECTUAL DISABILITY: ICD-10-CM

## 2023-02-08 DIAGNOSIS — Q86.0 FETAL ALCOHOL SYNDROME: ICD-10-CM

## 2023-02-08 LAB
BASOPHILS # BLD AUTO: 0.01 THOUSANDS/ÂΜL (ref 0–0.1)
BASOPHILS NFR BLD AUTO: 0 % (ref 0–1)
EOSINOPHIL # BLD AUTO: 0.07 THOUSAND/ÂΜL (ref 0–0.61)
EOSINOPHIL NFR BLD AUTO: 1 % (ref 0–6)
ERYTHROCYTE [DISTWIDTH] IN BLOOD BY AUTOMATED COUNT: 12.6 % (ref 11.6–15.1)
GLUCOSE 1H P 50 G GLC PO SERPL-MCNC: 117 MG/DL (ref 40–134)
HCT VFR BLD AUTO: 34.5 % (ref 34.8–46.1)
HGB BLD-MCNC: 10.9 G/DL (ref 11.5–15.4)
IMM GRANULOCYTES # BLD AUTO: 0.03 THOUSAND/UL (ref 0–0.2)
IMM GRANULOCYTES NFR BLD AUTO: 0 % (ref 0–2)
LYMPHOCYTES # BLD AUTO: 2.02 THOUSANDS/ÂΜL (ref 0.6–4.47)
LYMPHOCYTES NFR BLD AUTO: 23 % (ref 14–44)
MCH RBC QN AUTO: 27.5 PG (ref 26.8–34.3)
MCHC RBC AUTO-ENTMCNC: 31.6 G/DL (ref 31.4–37.4)
MCV RBC AUTO: 87 FL (ref 82–98)
MONOCYTES # BLD AUTO: 0.63 THOUSAND/ÂΜL (ref 0.17–1.22)
MONOCYTES NFR BLD AUTO: 7 % (ref 4–12)
NEUTROPHILS # BLD AUTO: 5.86 THOUSANDS/ÂΜL (ref 1.85–7.62)
NEUTS SEG NFR BLD AUTO: 69 % (ref 43–75)
NRBC BLD AUTO-RTO: 0 /100 WBCS
PLATELET # BLD AUTO: 237 THOUSANDS/UL (ref 149–390)
PMV BLD AUTO: 10.8 FL (ref 8.9–12.7)
RBC # BLD AUTO: 3.96 MILLION/UL (ref 3.81–5.12)
SL AMB  POCT GLUCOSE, UA: NEGATIVE
SL AMB POCT URINE PROTEIN: NEGATIVE
WBC # BLD AUTO: 8.62 THOUSAND/UL (ref 4.31–10.16)

## 2023-02-08 NOTE — PROGRESS NOTES
32 y o   at 27w4d, here for routine OB visit  Feeling well overall, but with reflux and hemorrhoids, which are improving  Good FM  Denies LOF, VB, contractions  No questions/concerns  Problem   27 Weeks Gestation of Pregnancy    Blood Type: A   +     Pap 10/24/2022  Neg  GC/CT Neg  - 28 Week Labs not complete: discussed and she will go today  - delivery consent signed today     Mild Intellectual Disability    Patient and  indicate that she may sign her own legal documents     Fetal Alcohol Syndrome    Of patient, with associated intellectual disability         Problem List Items Addressed This Visit        Other    Fetal alcohol syndrome    Mild intellectual disability    27 weeks gestation of pregnancy - Primary     Constipated:   Heartburn:          Other Visit Diagnoses     Encounter for supervision of other normal pregnancy, second trimester        Relevant Orders    POCT urine dip (Completed)

## 2023-02-08 NOTE — PROGRESS NOTES
Pt is here for routine ob visit   Concerns with heartburn and constipation   Urine neg/neg   No LOF,VB,Contractions   +FM   28wk Labs not completed   UTD flu shot   Declined tdap/covid vaccines   Yellow folder given/reviewed w/ pt  Delivery Consent signed today   Breast Pump ordered   Peds Referral placed at previous visit

## 2023-02-09 LAB — RPR SER QL: NORMAL

## 2023-02-22 PROBLEM — Z3A.30 30 WEEKS GESTATION OF PREGNANCY: Status: ACTIVE | Noted: 2022-09-23

## 2023-02-25 PROBLEM — O99.013 ANEMIA OF MOTHER IN PREGNANCY, ANTEPARTUM, THIRD TRIMESTER: Status: ACTIVE | Noted: 2023-02-25

## 2023-02-25 NOTE — ASSESSMENT & PLAN NOTE
Mahin Munoz is a 32 y o   here for OB visit at 30w2d weeks  TWG -0 181 kg (-6 4 oz)  She believes she had her coat and shoes on last visit when weighed  Improving nausea and vomiting  Vomits a couple times each week  Accompanied by assistant Shawnee Hartman  Denies LOF, vaginal bleeding or contractions  Instructed on performing FKC's daily 10 in 2 hours  28 week labs -slightly anemic  See separate diagnosis  TDAP and covid  refused  Aware of recommendations  Influenza vaccine received     MFM appt 3/15/23  RTO 2 weeks

## 2023-02-25 NOTE — ASSESSMENT & PLAN NOTE
mg po daily  TWG # 0 lbs  Following with MFM  Admits to eating appropriately throughout the day  Vomits twice weekly  Litroff

## 2023-02-25 NOTE — ASSESSMENT & PLAN NOTE
2/8/23 H/H 10 9/34 5<237  Iron supplementation- will start this week and plans to take every other morning with vitamin C  Daily prenatal vitamin ( has no iron)  Will recheck her CBC in 4 weeks

## 2023-02-27 ENCOUNTER — ROUTINE PRENATAL (OUTPATIENT)
Dept: OBGYN CLINIC | Facility: CLINIC | Age: 28
End: 2023-02-27

## 2023-02-27 VITALS
HEIGHT: 62 IN | WEIGHT: 219.6 LBS | BODY MASS INDEX: 40.41 KG/M2 | SYSTOLIC BLOOD PRESSURE: 114 MMHG | DIASTOLIC BLOOD PRESSURE: 72 MMHG

## 2023-02-27 DIAGNOSIS — O99.013 ANEMIA OF MOTHER IN PREGNANCY, ANTEPARTUM, THIRD TRIMESTER: ICD-10-CM

## 2023-02-27 DIAGNOSIS — F33.2 MAJOR DEPRESSIVE DISORDER, RECURRENT SEVERE WITHOUT PSYCHOTIC FEATURES (HCC): ICD-10-CM

## 2023-02-27 DIAGNOSIS — Z3A.30 30 WEEKS GESTATION OF PREGNANCY: ICD-10-CM

## 2023-02-27 DIAGNOSIS — O99.213 OBESITY AFFECTING PREGNANCY IN THIRD TRIMESTER: ICD-10-CM

## 2023-02-27 DIAGNOSIS — Z34.83 ENCOUNTER FOR SUPERVISION OF OTHER NORMAL PREGNANCY IN THIRD TRIMESTER: Primary | ICD-10-CM

## 2023-02-27 LAB
SL AMB  POCT GLUCOSE, UA: ABNORMAL
SL AMB POCT URINE PROTEIN: 1

## 2023-02-27 NOTE — PATIENT INSTRUCTIONS
Kick Counts in Pregnancy   WHAT YOU NEED TO KNOW:   Kick counts measure how much your baby is moving in your womb  A kick from your baby can be felt as a twist, turn, swish, roll, or jab  It is common to feel your baby kicking at 26 to 28 weeks of pregnancy  You may feel your baby kick as early as 20 weeks of pregnancy  You may want to start counting at 28 weeks  DISCHARGE INSTRUCTIONS:   Contact your doctor immediately if:   You feel a change in the number of kicks or movements of your baby  You feel fewer than 10 kicks within 2 hours  You have questions or concerns about your baby's movements  Why measure kick counts:  Your baby's movement may provide information about your baby's health  He or she may move less, or not at all, if there are problems  Your baby may move less if he or she is not getting enough oxygen or nutrition from the placenta  Do not smoke while you are pregnant  Smoking decreases the amount of oxygen that gets to your baby  Talk to your healthcare provider if you need help to quit smoking  Tell your healthcare provider as soon as you feel a change in your baby's movements  When to measure kick counts:   Measure kick counts at the same time every day  Measure kick counts when your baby is awake and most active  Your baby may be most active in the evening  How to measure kick counts:  Check that your baby is awake before you measure kick counts  You can wake up your baby by lightly pushing on your belly, walking, or drinking something cold  Your healthcare provider may tell you different ways to measure kick counts  You may be told to do the following:  Use a chart or clock to keep track of the time you start and finish counting  Sit in a chair or lie on your left side  Place your hands on the largest part of your belly  Count until you reach 10 kicks  Write down how much time it takes to count 10 kicks  It may take 30 minutes to 2 hours to count 10 kicks  It should not take more than 2 hours to count 10 kicks  Follow up with your doctor as directed:  Write down your questions so you remember to ask them during your visits  © Copyright Neli Santoro 2022 Information is for End User's use only and may not be sold, redistributed or otherwise used for commercial purposes  The above information is an  only  It is not intended as medical advice for individual conditions or treatments  Talk to your doctor, nurse or pharmacist before following any medical regimen to see if it is safe and effective for you

## 2023-02-27 NOTE — PROGRESS NOTES
Problem   Anemia of Mother in Pregnancy, Antepartum, Third Trimester   Obesity Affecting Pregnancy in Third Trimester   Major Depressive Disorder, Recurrent Severe Without Psychotic Features ([facility])   30 Weeks Gestation of Pregnancy    Blood Type: A   +     Pap 10/24/2022  Neg  GC/CT Neg  - 28 Week Labs not complete: discussed and she will go today  - delivery consent signed today       30 weeks gestation of pregnancy  Maria Eugenia Munguia is a 32 y o  Alla Gip here for OB visit at 30w2d weeks  TWG -0 181 kg (-6 4 oz)  She believes she had her coat and shoes on last visit when weighed  Improving nausea and vomiting  Vomits a couple times each week  Accompanied by assistant Torin Tabares  Denies LOF, vaginal bleeding or contractions  Instructed on performing FKC's daily 10 in 2 hours  28 week labs -slightly anemic  See separate diagnosis  TDAP and covid  refused  Aware of recommendations  Influenza vaccine received  MFM appt 3/15/23  RTO 2 weeks    Obesity affecting pregnancy in third trimester   mg po daily  TWG # 0 lbs  Following with MFM  Admits to eating appropriately throughout the day  Vomits twice weekly  Major depressive disorder, recurrent 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 psychotic features ([facility] [address] )  2 questions depression screen    Anemia of mother in pregnancy, antepartum, third trimester  2/8/23 H/H 10 9/34 5<237  Iron supplementation- will start this week and plans to take every other morning with vitamin C  Daily prenatal vitamin ( has no iron)  Will recheck her CBC in 4 weeks

## 2023-03-01 NOTE — TELEPHONE ENCOUNTER
, Ari Beckford, called in regards to switching patient's provider from dr Jayden Chang to a different provider whom may have more availability and in person appointment   Notified caller that I  will reach out to provider and have them contact her with further information Add Additional Information To The Post-Op Diagnosis: No

## 2023-03-03 LAB
DME PARACHUTE DELIVERY DATE REQUESTED: NORMAL
DME PARACHUTE ITEM DESCRIPTION: NORMAL
DME PARACHUTE ORDER STATUS: NORMAL
DME PARACHUTE SUPPLIER NAME: NORMAL
DME PARACHUTE SUPPLIER PHONE: NORMAL

## 2023-03-09 ENCOUNTER — ROUTINE PRENATAL (OUTPATIENT)
Dept: OBGYN CLINIC | Facility: CLINIC | Age: 28
End: 2023-03-09

## 2023-03-09 VITALS — BODY MASS INDEX: 40.39 KG/M2 | DIASTOLIC BLOOD PRESSURE: 74 MMHG | SYSTOLIC BLOOD PRESSURE: 104 MMHG | WEIGHT: 222.6 LBS

## 2023-03-09 DIAGNOSIS — O99.013 ANEMIA OF MOTHER IN PREGNANCY, ANTEPARTUM, THIRD TRIMESTER: ICD-10-CM

## 2023-03-09 DIAGNOSIS — O99.213 OBESITY AFFECTING PREGNANCY IN THIRD TRIMESTER: ICD-10-CM

## 2023-03-09 DIAGNOSIS — Q86.0 FETAL ALCOHOL SYNDROME: ICD-10-CM

## 2023-03-09 DIAGNOSIS — F70 MILD INTELLECTUAL DISABILITY: ICD-10-CM

## 2023-03-09 DIAGNOSIS — Z3A.31 31 WEEKS GESTATION OF PREGNANCY: ICD-10-CM

## 2023-03-09 DIAGNOSIS — Z34.83 PRENATAL CARE, SUBSEQUENT PREGNANCY, THIRD TRIMESTER: Primary | ICD-10-CM

## 2023-03-09 LAB
SL AMB  POCT GLUCOSE, UA: NORMAL
SL AMB POCT URINE PROTEIN: NORMAL

## 2023-03-09 NOTE — ASSESSMENT & PLAN NOTE
Has not started PO iron as she was afraid it would cause constipation  Reviewed recommendation and reasoning for this  Discussed risk of untreated anemia  Reviewed management of constipation should this occur  She will plant to begin iron supplement + Vitamin C every other day  Continue PNV  Reviewed iron rich foods  Will plan to recheck CBC later this month

## 2023-03-09 NOTE — ASSESSMENT & PLAN NOTE
Mary Walters  is a 32 y o   @31w5d who presents for routine prenatal visit  28 wk labs - anemia, see separate dx  Otherwise normal    Growth scan next week  TDAP vaccine declined - counseling given  Plans to breastfeed  Pump - ordered    Reports good fetal movement  Denies LOF, vaginal bleeding, regular uterine contractions, cramping, headaches or visual changes  Reviewed PTL precautions and FKC

## 2023-03-09 NOTE — PROGRESS NOTES
Problem List Items Addressed This Visit        Other    Fetal alcohol syndrome     Of patient, with associated intellectual disability   Her assistant accompanies her today  Mild intellectual disability    31 weeks gestation of pregnancy     Kasia Nair  is a 32 y o  Lindwood Sizer @31w5d who presents for routine prenatal visit  28 wk labs - anemia, see separate dx  Otherwise normal    Growth scan next week  TDAP vaccine declined - counseling given  Plans to breastfeed  Pump - ordered    Reports good fetal movement  Denies LOF, vaginal bleeding, regular uterine contractions, cramping, headaches or visual changes  Reviewed PTL precautions and FKC  Obesity affecting pregnancy in third trimester     Pregravid BMI 39  TWG 2 lbs  Taking LDASA  Growth US 3/15/23  Weekly NST at 37 weeks  Anemia of mother in pregnancy, antepartum, third trimester     Has not started PO iron as she was afraid it would cause constipation  Reviewed recommendation and reasoning for this  Discussed risk of untreated anemia  Reviewed management of constipation should this occur  She will plant to begin iron supplement + Vitamin C every other day  Continue PNV  Reviewed iron rich foods  Will plan to recheck CBC later this month           Other Visit Diagnoses     Prenatal care, subsequent pregnancy, third trimester    -  Primary    Relevant Orders    POCT urine dip (Completed)

## 2023-03-09 NOTE — PROGRESS NOTES
Patient here for prenatal visit  She denies any complaints  Good fetal movement  Urine neg for protein and glucose  28 week labs completed; has order for repeat CBC for 3/27/23  She had the flu vaccine; declined tdap and covid vaccines

## 2023-03-16 NOTE — PROGRESS NOTES
Please refer to the [facility] ultrasound report in Ob Procedures for additional information regarding today's visit

## 2023-03-17 ENCOUNTER — TELEPHONE (OUTPATIENT)
Facility: HOSPITAL | Age: 28
End: 2023-03-17

## 2023-03-17 ENCOUNTER — ULTRASOUND (OUTPATIENT)
Facility: HOSPITAL | Age: 28
End: 2023-03-17

## 2023-03-17 VITALS
DIASTOLIC BLOOD PRESSURE: 72 MMHG | SYSTOLIC BLOOD PRESSURE: 106 MMHG | HEART RATE: 98 BPM | WEIGHT: 224.4 LBS | BODY MASS INDEX: 41.3 KG/M2 | HEIGHT: 62 IN

## 2023-03-17 DIAGNOSIS — E66.01 MATERNAL MORBID OBESITY IN THIRD TRIMESTER, ANTEPARTUM (HCC): ICD-10-CM

## 2023-03-17 DIAGNOSIS — Z3A.33 33 WEEKS GESTATION OF PREGNANCY: Primary | ICD-10-CM

## 2023-03-17 DIAGNOSIS — O99.213 MATERNAL MORBID OBESITY IN THIRD TRIMESTER, ANTEPARTUM (HCC): ICD-10-CM

## 2023-03-17 NOTE — PATIENT INSTRUCTIONS
Kick Counts in Pregnancy   WHAT YOU NEED TO KNOW:   Kick counts measure how much your baby is moving in your womb  A kick from your baby can be felt as a twist, turn, swish, roll, or jab  It is common to feel your baby kicking at 26 to 28 weeks of pregnancy  You may feel your baby kick as early as 20 weeks of pregnancy  You may want to start counting at 28 weeks  DISCHARGE INSTRUCTIONS:   Contact your doctor immediately if:   You feel a change in the number of kicks or movements of your baby  You feel fewer than 10 kicks within 2 hours  You have questions or concerns about your baby's movements  Why measure kick counts:  Your baby's movement may provide information about your baby's health  He or she may move less, or not at all, if there are problems  Your baby may move less if he or she is not getting enough oxygen or nutrition from the placenta  Do not smoke while you are pregnant  Smoking decreases the amount of oxygen that gets to your baby  Talk to your healthcare provider if you need help to quit smoking  Tell your healthcare provider as soon as you feel a change in your baby's movements  When to measure kick counts:   Measure kick counts at the same time every day  Measure kick counts when your baby is awake and most active  Your baby may be most active in the evening  How to measure kick counts:  Check that your baby is awake before you measure kick counts  You can wake up your baby by lightly pushing on your belly, walking, or drinking something cold  Your healthcare provider may tell you different ways to measure kick counts  You may be told to do the following:  Use a chart or clock to keep track of the time you start and finish counting  Sit in a chair or lie on your left side  Place your hands on the largest part of your belly  Count until you reach 10 kicks  Write down how much time it takes to count 10 kicks  It may take 30 minutes to 2 hours to count 10 kicks  It should not take more than 2 hours to count 10 kicks  Follow up with your doctor as directed:  Write down your questions so you remember to ask them during your visits  © Copyright Aj Quivers 2022 Information is for End User's use only and may not be sold, redistributed or otherwise used for commercial purposes  The above information is an  only  It is not intended as medical advice for individual conditions or treatments  Talk to your doctor, nurse or pharmacist before following any medical regimen to see if it is safe and effective for you

## 2023-03-17 NOTE — TELEPHONE ENCOUNTER
Pt contacted to schedule follow-up from visit on 3/17/23  Pt staff member, Albino Hills, answered to assist in scheduling  Pt was scheduled for recommended follow-up, and was directed to call if any changes

## 2023-03-22 PROCEDURE — T1002 RN SERVICES UP TO 15 MINUTES: HCPCS

## 2023-03-24 ENCOUNTER — ROUTINE PRENATAL (OUTPATIENT)
Dept: OBGYN CLINIC | Facility: CLINIC | Age: 28
End: 2023-03-24

## 2023-03-24 VITALS — DIASTOLIC BLOOD PRESSURE: 78 MMHG | SYSTOLIC BLOOD PRESSURE: 118 MMHG | WEIGHT: 227 LBS | BODY MASS INDEX: 41.52 KG/M2

## 2023-03-24 DIAGNOSIS — K21.9 GASTROESOPHAGEAL REFLUX DISEASE WITHOUT ESOPHAGITIS: ICD-10-CM

## 2023-03-24 DIAGNOSIS — F32.A ANXIETY AND DEPRESSION: ICD-10-CM

## 2023-03-24 DIAGNOSIS — K21.9 GASTROESOPHAGEAL REFLUX IN PREGNANCY: ICD-10-CM

## 2023-03-24 DIAGNOSIS — F70 MILD INTELLECTUAL DISABILITY: ICD-10-CM

## 2023-03-24 DIAGNOSIS — F41.9 ANXIETY AND DEPRESSION: ICD-10-CM

## 2023-03-24 DIAGNOSIS — O99.619 GASTROESOPHAGEAL REFLUX IN PREGNANCY: ICD-10-CM

## 2023-03-24 DIAGNOSIS — E66.01 MATERNAL MORBID OBESITY IN THIRD TRIMESTER, ANTEPARTUM (HCC): ICD-10-CM

## 2023-03-24 DIAGNOSIS — O99.013 ANEMIA OF MOTHER IN PREGNANCY, ANTEPARTUM, THIRD TRIMESTER: ICD-10-CM

## 2023-03-24 DIAGNOSIS — F33.9 MAJOR DEPRESSION, RECURRENT, CHRONIC (HCC): ICD-10-CM

## 2023-03-24 DIAGNOSIS — Q86.0 FETAL ALCOHOL SYNDROME: ICD-10-CM

## 2023-03-24 DIAGNOSIS — F33.9 EPISODE OF RECURRENT MAJOR DEPRESSIVE DISORDER, UNSPECIFIED DEPRESSION EPISODE SEVERITY (HCC): ICD-10-CM

## 2023-03-24 DIAGNOSIS — Z3A.33 33 WEEKS GESTATION OF PREGNANCY: Primary | ICD-10-CM

## 2023-03-24 DIAGNOSIS — O99.213 MATERNAL MORBID OBESITY IN THIRD TRIMESTER, ANTEPARTUM (HCC): ICD-10-CM

## 2023-03-24 LAB
SL AMB  POCT GLUCOSE, UA: ABNORMAL
SL AMB POCT URINE PROTEIN: ABNORMAL

## 2023-03-24 RX ORDER — SERTRALINE HYDROCHLORIDE 100 MG/1
100 TABLET, FILM COATED ORAL DAILY
Qty: 30 TABLET | Refills: 2 | Status: SHIPPED | OUTPATIENT
Start: 2023-03-24

## 2023-03-24 RX ORDER — FAMOTIDINE 20 MG/1
20 TABLET, FILM COATED ORAL 2 TIMES DAILY
Qty: 60 TABLET | Refills: 1 | Status: SHIPPED | OUTPATIENT
Start: 2023-03-24

## 2023-03-24 NOTE — PROGRESS NOTES
PNV  33w6d    Patient denies any lof, vb or ctx  Patient has +fm  Patient urine is trace pro/neg glu   Flu - complete  Patient has no concerns today

## 2023-03-24 NOTE — ASSESSMENT & PLAN NOTE
Was previously seeing psych in [facility] office  Needs new referral for psych as this service is no longer offered in [facility] office  Referral placed  Zoloft refills sent to pharmacy

## 2023-03-24 NOTE — ASSESSMENT & PLAN NOTE
present at visit today  States they have established care plan for after baby arrives  Pt will continue to have daily support from her social workers  NIKO is in the picture and will be involved in childcare as well

## 2023-03-24 NOTE — PROGRESS NOTES
33 weeks gestation of pregnancy  Gonzalo Perry  is a 32 y o  Gopi Brandt @33w6d who presents for routine prenatal visit  She presents to the office today with her     28 wk labs - due for repeat CBC after starting PO iron  Growth scan- EFW31% at 32 wks  TDAP- declined    Reports good fetal movement  Denies LOF, vaginal bleeding, regular uterine contractions, cramping, headaches or visual changes  Reviewed PTL/Labor precautions and FKC  Anemia of mother in pregnancy, antepartum, third trimester  Taking PO iron  Due for repeat CBC; she will complete this in the upcoming weeks    Anxiety and depression  Was previously seeing psych in [facility] office  Needs new referral for psych as this service is no longer offered in [facility] office  Referral placed  Zoloft refills sent to pharmacy    Fetal alcohol syndrome  Of patient, with associated intellectual disabilit    Maternal morbid obesity in third trimester, antepartum ([address] )  Weekly APFS scheduled - starting next week    GERD (gastroesophageal reflux disease)  TUMS PRN with minimal relief  Now with more nausea  Advised Pepcid 20 mg BID   Rx sent to pharmacy    Mild intellectual disability   present at visit today  States they have established care plan for after baby arrives  Pt will continue to have daily support from her social workers  FOB is in the picture and will be involved in childcare as well

## 2023-03-24 NOTE — ASSESSMENT & PLAN NOTE
Juni Mena  is a 32 y o   @33w6d who presents for routine prenatal visit  She presents to the office today with her     28 wk labs - due for repeat CBC after starting PO iron  Growth scan- EFW31% at 32 wks  TDAP- declined    Reports good fetal movement  Denies LOF, vaginal bleeding, regular uterine contractions, cramping, headaches or visual changes  Reviewed PTL/Labor precautions and FKC

## 2023-03-28 ENCOUNTER — ULTRASOUND (OUTPATIENT)
Facility: HOSPITAL | Age: 28
End: 2023-03-28

## 2023-03-28 VITALS
WEIGHT: 223.4 LBS | HEIGHT: 62 IN | SYSTOLIC BLOOD PRESSURE: 102 MMHG | BODY MASS INDEX: 41.11 KG/M2 | HEART RATE: 86 BPM | DIASTOLIC BLOOD PRESSURE: 62 MMHG

## 2023-03-28 DIAGNOSIS — E66.01 MATERNAL MORBID OBESITY IN THIRD TRIMESTER, ANTEPARTUM (HCC): Primary | ICD-10-CM

## 2023-03-28 DIAGNOSIS — Z3A.34 34 WEEKS GESTATION OF PREGNANCY: ICD-10-CM

## 2023-03-28 DIAGNOSIS — O99.213 MATERNAL MORBID OBESITY IN THIRD TRIMESTER, ANTEPARTUM (HCC): Primary | ICD-10-CM

## 2023-03-28 NOTE — LETTER
NST sleeve cover sheet    Patient name: Steve Robb  : 1995  MRN: [id number]    JOLIE: Estimated Date of Delivery: 23    Obstetrician: Philip Barajas    Reason(s) for testing:   Obesity,   __________________________________________      Testing frequency:    ___ 2x/wk  __X_ 1x/wk  ___ Dopplers  ___ BPP?       Last growth scan: __________________________________________

## 2023-03-28 NOTE — PROGRESS NOTES
[facility]: Ms Dayday Tracey was seen today for NST (found under the pregnancy episode) which I reviewed the RN assessment and agree, and FIORDALIZA (see ultrasound report under OB procedures tab)  Please don't hesitate to contact our office with any concerns or questions    -Lloyd Diego MD

## 2023-03-28 NOTE — PROGRESS NOTES
Non-Stress Testing:    Non-Stress test, equipment, procedure, and expected outcomes reviewed  Reviewed fetal kick counts and when to call OB  Jillian Brandt Verified patient understanding of fetal kick counts with teach back method  Patient reports feeling daily fetal movements  Patient has no questions or concerns  Dr Patrick Mason viewed NST strip prior to completion of visit

## 2023-03-29 PROCEDURE — T1002 RN SERVICES UP TO 15 MINUTES: HCPCS

## 2023-04-03 ENCOUNTER — ULTRASOUND (OUTPATIENT)
Facility: HOSPITAL | Age: 28
End: 2023-04-03

## 2023-04-03 VITALS
HEART RATE: 84 BPM | HEIGHT: 62 IN | DIASTOLIC BLOOD PRESSURE: 68 MMHG | WEIGHT: 226.6 LBS | BODY MASS INDEX: 41.7 KG/M2 | SYSTOLIC BLOOD PRESSURE: 122 MMHG

## 2023-04-03 DIAGNOSIS — Z3A.35 35 WEEKS GESTATION OF PREGNANCY: Primary | ICD-10-CM

## 2023-04-03 DIAGNOSIS — O99.213 OBESITY AFFECTING PREGNANCY IN THIRD TRIMESTER: ICD-10-CM

## 2023-04-03 PROBLEM — Z00.00 MEDICARE ANNUAL WELLNESS VISIT, SUBSEQUENT: Status: RESOLVED | Noted: 2018-10-09 | Resolved: 2023-04-03

## 2023-04-03 NOTE — LETTER
NST sleeve cover sheet    Patient name: Baljit Berumen  : 1995  MRN: [id number]    JOLIE: Estimated Date of Delivery: 23    Obstetrician: ____________SLOGA___________    Reason(s) for testing:  _________________Obesity_________________      Testing frequency:    ___ 2x/wk  _x_ 1x/wk  ___ Dopplers  ___ BPP?       Last growth scan: __________________________________________

## 2023-04-03 NOTE — PROGRESS NOTES
Repeat Non-Stress Testing:    Patient verbalizes +FM  Pt denies ALL:               Leaking of fluid   Contractions   Vaginal bleeding   Decreased fetal movement    Patient is performing daily kick counts  Patient has no questions or concerns  NST strip reviewed by Dr Kiara Garcia

## 2023-04-06 ENCOUNTER — ROUTINE PRENATAL (OUTPATIENT)
Dept: OBGYN CLINIC | Facility: CLINIC | Age: 28
End: 2023-04-06

## 2023-04-06 VITALS
DIASTOLIC BLOOD PRESSURE: 66 MMHG | WEIGHT: 226.6 LBS | HEART RATE: 100 BPM | SYSTOLIC BLOOD PRESSURE: 96 MMHG | BODY MASS INDEX: 41.45 KG/M2

## 2023-04-06 DIAGNOSIS — O99.213 MATERNAL MORBID OBESITY IN THIRD TRIMESTER, ANTEPARTUM (HCC): ICD-10-CM

## 2023-04-06 DIAGNOSIS — E66.01 MATERNAL MORBID OBESITY IN THIRD TRIMESTER, ANTEPARTUM (HCC): ICD-10-CM

## 2023-04-06 DIAGNOSIS — M54.41 ACUTE RIGHT-SIDED LOW BACK PAIN WITH RIGHT-SIDED SCIATICA: ICD-10-CM

## 2023-04-06 DIAGNOSIS — Z3A.35 35 WEEKS GESTATION OF PREGNANCY: Primary | ICD-10-CM

## 2023-04-06 DIAGNOSIS — Z34.83 PRENATAL CARE, SUBSEQUENT PREGNANCY, THIRD TRIMESTER: ICD-10-CM

## 2023-04-06 DIAGNOSIS — F70 MILD INTELLECTUAL DISABILITY: ICD-10-CM

## 2023-04-06 DIAGNOSIS — O99.013 ANEMIA OF MOTHER IN PREGNANCY, ANTEPARTUM, THIRD TRIMESTER: ICD-10-CM

## 2023-04-06 LAB
SL AMB  POCT GLUCOSE, UA: ABNORMAL
SL AMB POCT URINE PROTEIN: ABNORMAL

## 2023-04-06 NOTE — ASSESSMENT & PLAN NOTE
Yuly Garza  is a 32 y o   @35w5d who presents for routine prenatal visit  C/o right sided low back pain that extends into the right buttock x 1 week  Brought on by activity and right leg movement and relives at rest  No numbness, tingling, or weakness  Little change with tylenol  Exam consistent with low back pain with sciatica  Recommended tylenol, gentle stretching, massage, and heat application  Discussed benefit PT may provide and order provided  Reviewed sx to report  28 wk labs - has order for repeat CBC, not yet completed   TDAP - declined   GBS at next visit   Plans to breastfeed  Pump - new order provided     Reports good fetal movement  Denies LOF, vaginal bleeding, regular uterine contractions, cramping, headaches or visual changes  Reviewed PTL precautions and FKC

## 2023-04-06 NOTE — PROGRESS NOTES
Patient here for prenatal visit  She states she has right lower back pain for the past week; denies spotting, cramping or LOF  Good fetal movement  Urine neg for glucose, trace of protein

## 2023-04-06 NOTE — PROGRESS NOTES
Problem List Items Addressed This Visit        Other    Mild intellectual disability    35 weeks gestation of pregnancy     Ele Duff  is a 32 y o  Sherrel Daily @35w5d who presents for routine prenatal visit  C/o right sided low back pain that extends into the right buttock x 1 week  Brought on by activity and right leg movement and relives at rest  No numbness, tingling, or weakness  Little change with tylenol  Exam consistent with low back pain with sciatica  Recommended tylenol, gentle stretching, massage, and heat application  Discussed benefit PT may provide and order provided  Reviewed sx to report  28 wk labs - has order for repeat CBC, not yet completed   TDAP - declined   GBS at next visit   Plans to breastfeed  Pump - new order provided     Reports good fetal movement  Denies LOF, vaginal bleeding, regular uterine contractions, cramping, headaches or visual changes  Reviewed PTL precautions and FKC  Anemia of mother in pregnancy, antepartum, third trimester    Maternal morbid obesity in third trimester, antepartum ([facility] [address] )     Going for weekly APFS  D/c ASA today           Other Visit Diagnoses     Acute right-sided low back pain with right-sided sciatica    -  Primary    Relevant Orders    Ambulatory Referral to Physical Therapy    Prenatal care, subsequent pregnancy, third trimester        Relevant Orders    POCT urine dip

## 2023-04-10 PROBLEM — A59.9 TRICHOMONIASIS: Status: RESOLVED | Noted: 2020-05-28 | Resolved: 2023-04-10

## 2023-04-10 PROBLEM — Z3A.36 36 WEEKS GESTATION OF PREGNANCY: Status: ACTIVE | Noted: 2022-09-23

## 2023-04-12 PROCEDURE — T1002 RN SERVICES UP TO 15 MINUTES: HCPCS

## 2023-04-17 PROBLEM — O40.3XX0 POLYHYDRAMNIOS IN THIRD TRIMESTER: Status: ACTIVE | Noted: 2023-04-17

## 2023-04-18 PROBLEM — O99.213 OBESITY AFFECTING PREGNANCY IN THIRD TRIMESTER: Status: RESOLVED | Noted: 2022-11-23 | Resolved: 2023-04-18

## 2023-04-18 PROBLEM — B95.1 POSITIVE GBS TEST: Status: ACTIVE | Noted: 2023-04-18

## 2023-04-18 PROBLEM — Z3A.37 37 WEEKS GESTATION OF PREGNANCY: Status: ACTIVE | Noted: 2022-09-23

## 2023-04-19 PROCEDURE — T1002 RN SERVICES UP TO 15 MINUTES: HCPCS

## 2023-04-20 PROCEDURE — T1002 RN SERVICES UP TO 15 MINUTES: HCPCS

## 2023-04-23 NOTE — PROGRESS NOTES
Please refer to the [facility] ultrasound report in Ob Procedures for additional information regarding today's visit

## 2023-04-24 ENCOUNTER — ULTRASOUND (OUTPATIENT)
Facility: HOSPITAL | Age: 28
End: 2023-04-24

## 2023-04-24 VITALS
BODY MASS INDEX: 42.21 KG/M2 | HEIGHT: 62 IN | DIASTOLIC BLOOD PRESSURE: 76 MMHG | WEIGHT: 229.4 LBS | SYSTOLIC BLOOD PRESSURE: 112 MMHG | HEART RATE: 83 BPM

## 2023-04-24 DIAGNOSIS — O99.213 MATERNAL MORBID OBESITY IN THIRD TRIMESTER, ANTEPARTUM (HCC): Primary | ICD-10-CM

## 2023-04-24 DIAGNOSIS — Z3A.38 38 WEEKS GESTATION OF PREGNANCY: ICD-10-CM

## 2023-04-24 DIAGNOSIS — O40.3XX0 POLYHYDRAMNIOS IN THIRD TRIMESTER COMPLICATION, SINGLE OR UNSPECIFIED FETUS: ICD-10-CM

## 2023-04-24 DIAGNOSIS — E66.01 MATERNAL MORBID OBESITY IN THIRD TRIMESTER, ANTEPARTUM (HCC): Primary | ICD-10-CM

## 2023-04-24 NOTE — PROGRESS NOTES
Repeat Non-Stress Testing:    Patient verbalizes +FM  Pt denies ALL:               Leaking of fluid   Contractions   Vaginal bleeding   Decreased fetal movement    Patient is performing daily kick counts  Patient has no questions or concerns  NST strip reviewed by Dr Salvador Ventura

## 2023-04-25 ENCOUNTER — APPOINTMENT (OUTPATIENT)
Dept: LAB | Facility: CLINIC | Age: 28
End: 2023-04-25

## 2023-04-25 ENCOUNTER — ROUTINE PRENATAL (OUTPATIENT)
Dept: OBGYN CLINIC | Facility: CLINIC | Age: 28
End: 2023-04-25

## 2023-04-25 ENCOUNTER — TELEPHONE (OUTPATIENT)
Dept: OBGYN CLINIC | Facility: CLINIC | Age: 28
End: 2023-04-25

## 2023-04-25 VITALS
HEART RATE: 86 BPM | SYSTOLIC BLOOD PRESSURE: 122 MMHG | DIASTOLIC BLOOD PRESSURE: 62 MMHG | WEIGHT: 232 LBS | BODY MASS INDEX: 42.43 KG/M2

## 2023-04-25 DIAGNOSIS — E66.01 MATERNAL MORBID OBESITY IN THIRD TRIMESTER, ANTEPARTUM (HCC): ICD-10-CM

## 2023-04-25 DIAGNOSIS — Z30.09 ENCOUNTER FOR OTHER GENERAL COUNSELING OR ADVICE ON CONTRACEPTION: ICD-10-CM

## 2023-04-25 DIAGNOSIS — O40.3XX0 POLYHYDRAMNIOS IN THIRD TRIMESTER COMPLICATION, SINGLE OR UNSPECIFIED FETUS: ICD-10-CM

## 2023-04-25 DIAGNOSIS — K21.9 GASTROESOPHAGEAL REFLUX DISEASE WITHOUT ESOPHAGITIS: ICD-10-CM

## 2023-04-25 DIAGNOSIS — B95.1 POSITIVE GBS TEST: ICD-10-CM

## 2023-04-25 DIAGNOSIS — Z3A.38 38 WEEKS GESTATION OF PREGNANCY: ICD-10-CM

## 2023-04-25 DIAGNOSIS — O99.213 MATERNAL MORBID OBESITY IN THIRD TRIMESTER, ANTEPARTUM (HCC): ICD-10-CM

## 2023-04-25 DIAGNOSIS — O99.013 ANEMIA OF MOTHER IN PREGNANCY, ANTEPARTUM, THIRD TRIMESTER: ICD-10-CM

## 2023-04-25 DIAGNOSIS — F33.2 MAJOR DEPRESSIVE DISORDER, RECURRENT SEVERE WITHOUT PSYCHOTIC FEATURES (HCC): ICD-10-CM

## 2023-04-25 DIAGNOSIS — Q86.0 FETAL ALCOHOL SYNDROME: ICD-10-CM

## 2023-04-25 DIAGNOSIS — Z34.83 PRENATAL CARE, SUBSEQUENT PREGNANCY, THIRD TRIMESTER: Primary | ICD-10-CM

## 2023-04-25 PROBLEM — Z30.9 CONTRACEPTION MANAGEMENT: Status: ACTIVE | Noted: 2023-04-25

## 2023-04-25 LAB
BASOPHILS # BLD AUTO: 0.01 THOUSANDS/ΜL (ref 0–0.1)
BASOPHILS NFR BLD AUTO: 0 % (ref 0–1)
EOSINOPHIL # BLD AUTO: 0.08 THOUSAND/ΜL (ref 0–0.61)
EOSINOPHIL NFR BLD AUTO: 1 % (ref 0–6)
ERYTHROCYTE [DISTWIDTH] IN BLOOD BY AUTOMATED COUNT: 13.6 % (ref 11.6–15.1)
HCT VFR BLD AUTO: 32.1 % (ref 34.8–46.1)
HGB BLD-MCNC: 10.3 G/DL (ref 11.5–15.4)
IMM GRANULOCYTES # BLD AUTO: 0.05 THOUSAND/UL (ref 0–0.2)
IMM GRANULOCYTES NFR BLD AUTO: 1 % (ref 0–2)
LYMPHOCYTES # BLD AUTO: 2.61 THOUSANDS/ΜL (ref 0.6–4.47)
LYMPHOCYTES NFR BLD AUTO: 27 % (ref 14–44)
MCH RBC QN AUTO: 27 PG (ref 26.8–34.3)
MCHC RBC AUTO-ENTMCNC: 32.1 G/DL (ref 31.4–37.4)
MCV RBC AUTO: 84 FL (ref 82–98)
MONOCYTES # BLD AUTO: 0.97 THOUSAND/ΜL (ref 0.17–1.22)
MONOCYTES NFR BLD AUTO: 10 % (ref 4–12)
NEUTROPHILS # BLD AUTO: 5.85 THOUSANDS/ΜL (ref 1.85–7.62)
NEUTS SEG NFR BLD AUTO: 61 % (ref 43–75)
NRBC BLD AUTO-RTO: 0 /100 WBCS
PLATELET # BLD AUTO: 238 THOUSANDS/UL (ref 149–390)
PMV BLD AUTO: 12.1 FL (ref 8.9–12.7)
RBC # BLD AUTO: 3.82 MILLION/UL (ref 3.81–5.12)
SL AMB  POCT GLUCOSE, UA: NEGATIVE
SL AMB POCT URINE PROTEIN: NEGATIVE
WBC # BLD AUTO: 9.57 THOUSAND/UL (ref 4.31–10.16)

## 2023-04-25 NOTE — PROGRESS NOTES
Prenatal visit at 45 3/7wks  Denies ctxs, VB, LOF  Good FM  Feels well  Declines elective induction  Problem List Items Addressed This Visit        Digestive    GERD (gastroesophageal reflux disease)     Doing well on Pepcid  Other    Fetal alcohol syndrome     Has social work support at her visits and will have the same in labor  38 weeks gestation of pregnancy     Advised to ensure bags packed and carseat in car  ARRIVE trial previously discussed, and patient declines 39 week induction  Discussed 41 week induction, and patient amenable to this  Major depressive disorder, recurrent 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 psychotic features (HCC)     Continues Zoloft 100mg daily  Anemia of mother in pregnancy, antepartum, third trimester     On oral iron  Will go for repeat CBC today  Maternal morbid obesity in third trimester, antepartum ([facility] [address] )     TWG 12#  Continues weekly antepartum fetal surveillance with MFM  Polyhydramnios in third trimester     Continue weekly FOIRDALIZA with MFM  Positive GBS test     Will need antibiotics in labor  Contraception management     Discussed options given medicaid  Has used Mirena in the past - had it removed after 4 years secondary to pain but had done well previously  Discussed may have moved and may still be a good option  Has used Nexplanon and did not like that she could feel it  Brochures given and will continue to discuss           Other Visit Diagnoses     Prenatal care, subsequent pregnancy, third trimester    -  Primary    Relevant Orders    POCT urine dip

## 2023-04-25 NOTE — ASSESSMENT & PLAN NOTE
Discussed options given medicaid  Has used Mirena in the past - had it removed after 4 years secondary to pain but had done well previously  Discussed may have moved and may still be a good option  Has used Nexplanon and did not like that she could feel it  Brochures given and will continue to discuss

## 2023-04-25 NOTE — ASSESSMENT & PLAN NOTE
Advised to ensure bags packed and carseat in car  ARRIVE trial previously discussed, and patient declines 39 week induction  Discussed 41 week induction, and patient amenable to this

## 2023-04-25 NOTE — TELEPHONE ENCOUNTER
----- Message from Terryann Canavan, MD sent at 2023 10:05 AM EDT -----  Regardin week induction  Procedure to be scheduled (IOL or CS): IOL    JOLIE: Estimated Date of Delivery: 23     Indication for delivery: Post-dates    Requested date (s) of delivery:  to    If requested date is unavailable, is there a date by which the pt must be delivered?      Physician preference: none    If IOL, anticipated method: cytotec/win

## 2023-04-26 NOTE — TELEPHONE ENCOUNTER
Pt has a  (Suzzanne Riedel) who makes all calls for pt as she has intellectual disabilities  The # on patients chart is Shelly Chambers  [phone]  You called Suzzanne Riedel last evening  She will be available to be called anytime today    Thank you

## 2023-04-27 ENCOUNTER — TELEPHONE (OUTPATIENT)
Dept: OBGYN CLINIC | Facility: MEDICAL CENTER | Age: 28
End: 2023-04-27

## 2023-04-27 NOTE — TELEPHONE ENCOUNTER
Spoke with Herman Culver and made aware of this CBC result  She states Celina Chandra ran out of her iron a few days before this last blood draw   She is now taking it daily (previously was taking it every other day )

## 2023-05-02 ENCOUNTER — ROUTINE PRENATAL (OUTPATIENT)
Dept: OBGYN CLINIC | Facility: CLINIC | Age: 28
End: 2023-05-02

## 2023-05-02 VITALS — SYSTOLIC BLOOD PRESSURE: 118 MMHG | BODY MASS INDEX: 41.59 KG/M2 | DIASTOLIC BLOOD PRESSURE: 80 MMHG | WEIGHT: 227.4 LBS

## 2023-05-02 DIAGNOSIS — F41.9 ANXIETY AND DEPRESSION: ICD-10-CM

## 2023-05-02 DIAGNOSIS — Z3A.39 39 WEEKS GESTATION OF PREGNANCY: ICD-10-CM

## 2023-05-02 DIAGNOSIS — O99.013 ANEMIA OF MOTHER IN PREGNANCY, ANTEPARTUM, THIRD TRIMESTER: ICD-10-CM

## 2023-05-02 DIAGNOSIS — F32.A ANXIETY AND DEPRESSION: ICD-10-CM

## 2023-05-02 DIAGNOSIS — Z34.83 PRENATAL CARE, SUBSEQUENT PREGNANCY, THIRD TRIMESTER: Primary | ICD-10-CM

## 2023-05-02 DIAGNOSIS — B95.1 POSITIVE GBS TEST: ICD-10-CM

## 2023-05-02 DIAGNOSIS — Q86.0 FETAL ALCOHOL SYNDROME: ICD-10-CM

## 2023-05-02 DIAGNOSIS — O40.3XX0 POLYHYDRAMNIOS IN THIRD TRIMESTER COMPLICATION, SINGLE OR UNSPECIFIED FETUS: ICD-10-CM

## 2023-05-02 PROCEDURE — T1002 RN SERVICES UP TO 15 MINUTES: HCPCS

## 2023-05-02 NOTE — PROGRESS NOTES
Patient is here for routine Prenatal Visit  39 Weeks , 3 days  Blood Pressure 138/80  Sophie cruz aware  Patient stated she's been feeling sick to her stomach all The time  Denies: contractions Vaginal Bleeding Loss of fluid   Good fetal movements

## 2023-05-02 NOTE — PROGRESS NOTES
Please refer to the [facility] ultrasound report in Ob Procedures for additional information regarding today's visit

## 2023-05-02 NOTE — ASSESSMENT & PLAN NOTE
Iglesia Almeida  is a 32 y o   @39w3d who presents for routine prenatal visit  IOL request submitted at last visit for 40 wk IOL scheduling  Cervix checked today  Cervix remains unchanged so will plan to keep request with ripening    GBS negative    Reports good fetal movement  Denies LOF, vaginal bleeding, regular uterine contractions, cramping, headaches or visual changes  Reviewed PTL/Labor precautions and FKC

## 2023-05-02 NOTE — PROGRESS NOTES
39 weeks gestation of pregnancy  Jose Mendoza  is a 32 y o  Wendy Mealing @39w3d who presents for routine prenatal visit  IOL request submitted at last visit for 40 wk IOL scheduling  Cervix checked today  Cervix remains unchanged so will plan to keep request with ripening    GBS negative    Reports good fetal movement  Denies LOF, vaginal bleeding, regular uterine contractions, cramping, headaches or visual changes  Reviewed PTL/Labor precautions and FKC            Anemia of mother in pregnancy, antepartum, third trimester  Last CBC 10 3- taking PO iron daily    Anxiety and depression  Stable on Zoloft 100 mg QD at this time    Polyhydramnios in third trimester  Continue weekly AFIs with MFM- scheduled tomorrow  Passed glucola at 28 wks- 117    Positive GBS test  Abx with labor    Fetal alcohol syndrome  Of patient, with associated intellectual disability  Social workers present for each prenatal care and assist with patient's care at home

## 2023-05-02 NOTE — ASSESSMENT & PLAN NOTE
Of patient, with associated intellectual disability  Social workers present for each prenatal care and assist with patient's care at home

## 2023-05-03 ENCOUNTER — ULTRASOUND (OUTPATIENT)
Facility: HOSPITAL | Age: 28
End: 2023-05-03

## 2023-05-03 VITALS
HEIGHT: 62 IN | WEIGHT: 225.6 LBS | SYSTOLIC BLOOD PRESSURE: 124 MMHG | BODY MASS INDEX: 41.51 KG/M2 | HEART RATE: 82 BPM | DIASTOLIC BLOOD PRESSURE: 68 MMHG

## 2023-05-03 DIAGNOSIS — E66.01 MATERNAL MORBID OBESITY IN THIRD TRIMESTER, ANTEPARTUM (HCC): ICD-10-CM

## 2023-05-03 DIAGNOSIS — O40.3XX0 POLYHYDRAMNIOS IN THIRD TRIMESTER COMPLICATION, SINGLE OR UNSPECIFIED FETUS: Primary | ICD-10-CM

## 2023-05-03 DIAGNOSIS — Z3A.39 39 WEEKS GESTATION OF PREGNANCY: ICD-10-CM

## 2023-05-03 DIAGNOSIS — O99.213 MATERNAL MORBID OBESITY IN THIRD TRIMESTER, ANTEPARTUM (HCC): ICD-10-CM

## 2023-05-03 PROCEDURE — T1002 RN SERVICES UP TO 15 MINUTES: HCPCS

## 2023-05-03 NOTE — PROGRESS NOTES
Repeat Non-Stress Testing:    Patient verbalizes +FM  Pt denies ALL:               Leaking of fluid   Contractions   Vaginal bleeding   Decreased fetal movement    Patient is performing daily kick counts  Patient has no questions or concerns     NST strip reviewed by Dr Jonathan Cross

## 2023-05-03 NOTE — LETTER
May 3, 2023     Kenneth Acosta [address]  [address]    Patient: Tesha Arechiga   YOB: 1995   Date of Visit: 5/3/2023       Dear Dr Mj Singer: Thank you for referring Tesha Arechiga to me for evaluation  Below are my notes for this consultation  If you have questions, please do not hesitate to call me  I look forward to following your patient along with you           Sincerely,        Glendy Cm MD        CC: No Recipients  Glendy Cm MD  5/2/2023  2:44 PM  Sign when Signing Visit  Please refer to the [facility] ultrasound report in Ob Procedures for additional information regarding today's visit

## 2023-05-08 ENCOUNTER — TELEPHONE (OUTPATIENT)
Dept: OBGYN CLINIC | Facility: CLINIC | Age: 28
End: 2023-05-08

## 2023-05-08 ENCOUNTER — ROUTINE PRENATAL (OUTPATIENT)
Dept: OBGYN CLINIC | Facility: CLINIC | Age: 28
End: 2023-05-08

## 2023-05-08 VITALS — DIASTOLIC BLOOD PRESSURE: 78 MMHG | WEIGHT: 229 LBS | SYSTOLIC BLOOD PRESSURE: 112 MMHG | BODY MASS INDEX: 41.88 KG/M2

## 2023-05-08 DIAGNOSIS — Q86.0 FETAL ALCOHOL SYNDROME: ICD-10-CM

## 2023-05-08 DIAGNOSIS — Z3A.40 40 WEEKS GESTATION OF PREGNANCY: Primary | ICD-10-CM

## 2023-05-08 LAB
SL AMB  POCT GLUCOSE, UA: NORMAL
SL AMB POCT URINE PROTEIN: NORMAL

## 2023-05-08 NOTE — PROGRESS NOTES
PNV  40w2d    Patient denies any lof, vb  Patient has +fm    Patient urine is   Patient has tightness in stomach and back, lower pressure/pain

## 2023-05-08 NOTE — PROGRESS NOTES
Prenatal Visit  Subjective:   Manuel Parish is a 32 y o   40w2d here for Routine Prenatal Visit    OB Complaints  Cramping/Contractions: some occasional tightening  LOF/VB: denies  Fetal activity: active  IOL previously requested for   Awaiting scheduling  Objective:  Vitals:    23 1002   BP: 112/78     Pregravid Weight/BMI: 99 8 kg (220 lb) (BMI 40 23)  Current Weight: 104 kg (229 lb)   Total Weight Gain: 4 082 kg (9 lb)     OBGyn Exam  Physical Exam  Fetal Heart Rate: 125 , Fundal Height (cm): 41 cm    General: Not in acute distress and appearing well nourished and well groomed  Genitourinary:          Pelvic exam unchanged  still 1 cm   Cardiovascular:   Rate and Rhythm: Normal rate  Pulmonary:    Effort: normal, not labored  Abdominal:  Abdomen is soft and gravid   Musculoskeletal: Active movement of all extremities, no gross limitations of ROM     Edema: None  Neurological:   Mental Status: She is alert and oriented to person, place, and time  Skin: General: Skin is warm and dry  Psychiatric:    Mood and Affect: Mood normal       Behavior: Behavior normal      Assessment & Plan:    1  40 weeks gestation of pregnancy  Assessment & Plan:  Pt is doing well overall  Some cramping intermittently but no ctx  Fetus remains active  IOL scheduled for  at 3 pm  S/S labor reviewed with pt and her   Has A/N testing with [facility] tomorrow      2  Fetal alcohol syndrome  Assessment & Plan:  Pt w/ FAS   accompanied pt to appt  Today and coordinating care for pt            LIZ Rodriguez  2023

## 2023-05-08 NOTE — ASSESSMENT & PLAN NOTE
Pt is doing well overall  Some cramping intermittently but no ctx  Fetus remains active  IOL scheduled for 5/11 at 3 pm  S/S labor reviewed with pt and her     Has A/N testing with [facility] tomorrow

## 2023-05-08 NOTE — TELEPHONE ENCOUNTER
IOL scheduled for 5/11 at 3 pm  Lenka(caregiver) notified of IOL date,time,and location  Aware to enter via [facility] 2nd floor  Advised she may eat a light lunch prior to going to L&D  In the interim please report any vaginal bleeding,leakage of fluid,decreased fetal movement or contractions  Vitor Licona verbalizes understanding  Routed to on call providers as JANELLE

## 2023-05-10 ENCOUNTER — ANESTHESIA EVENT (INPATIENT)
Dept: LABOR AND DELIVERY | Facility: HOSPITAL | Age: 28
End: 2023-05-10

## 2023-05-10 ENCOUNTER — HOSPITAL ENCOUNTER (OUTPATIENT)
Dept: LABOR AND DELIVERY | Facility: HOSPITAL | Age: 28
Discharge: HOME | End: 2023-05-10

## 2023-05-10 ENCOUNTER — ANESTHESIA (INPATIENT)
Dept: LABOR AND DELIVERY | Facility: HOSPITAL | Age: 28
End: 2023-05-10

## 2023-05-10 ENCOUNTER — HOSPITAL ENCOUNTER (INPATIENT)
Facility: HOSPITAL | Age: 28
LOS: 4 days | Discharge: HOME | End: 2023-05-14
Attending: OBSTETRICS & GYNECOLOGY | Admitting: OBSTETRICS & GYNECOLOGY

## 2023-05-10 DIAGNOSIS — Z3A.40 40 WEEKS GESTATION OF PREGNANCY: Primary | ICD-10-CM

## 2023-05-10 DIAGNOSIS — F70 MILD INTELLECTUAL DISABILITY: ICD-10-CM

## 2023-05-10 DIAGNOSIS — Z98.891 STATUS POST PRIMARY LOW TRANSVERSE CESAREAN SECTION: ICD-10-CM

## 2023-05-10 DIAGNOSIS — Z98.891 S/P PRIMARY LOW TRANSVERSE C-SECTION: ICD-10-CM

## 2023-05-10 LAB
ABO GROUP BLD: NORMAL
BLD GP AB SCN SERPL QL: NEGATIVE
ERYTHROCYTE [DISTWIDTH] IN BLOOD BY AUTOMATED COUNT: 14.4 % (ref 11.6–15.1)
HCT VFR BLD AUTO: 33.8 % (ref 34.8–46.1)
HGB BLD-MCNC: 10.7 G/DL (ref 11.5–15.4)
HOLD SPECIMEN: NORMAL
MCH RBC QN AUTO: 26.7 PG (ref 26.8–34.3)
MCHC RBC AUTO-ENTMCNC: 31.7 G/DL (ref 31.4–37.4)
MCV RBC AUTO: 84 FL (ref 82–98)
PLATELET # BLD AUTO: 217 THOUSANDS/UL (ref 149–390)
PMV BLD AUTO: 12.3 FL (ref 8.9–12.7)
RBC # BLD AUTO: 4.01 MILLION/UL (ref 3.81–5.12)
RH BLD: POSITIVE
SPECIMEN EXPIRATION DATE: NORMAL
TREPONEMA PALLIDUM IGG+IGM AB [PRESENCE] IN SERUM OR PLASMA BY IMMUNOASSAY: NORMAL
WBC # BLD AUTO: 9.38 THOUSAND/UL (ref 4.31–10.16)

## 2023-05-10 RX ORDER — LIDOCAINE HYDROCHLORIDE AND EPINEPHRINE 15; 5 MG/ML; UG/ML
INJECTION, SOLUTION EPIDURAL AS NEEDED
Status: DISCONTINUED | OUTPATIENT
Start: 2023-05-10 | End: 2023-05-11

## 2023-05-10 RX ORDER — NALBUPHINE HCL 10 MG/ML
5 AMPUL (ML) INJECTION
Status: DISCONTINUED | OUTPATIENT
Start: 2023-05-10 | End: 2023-05-11

## 2023-05-10 RX ORDER — SODIUM CHLORIDE, SODIUM LACTATE, POTASSIUM CHLORIDE, CALCIUM CHLORIDE 600; 310; 30; 20 MG/100ML; MG/100ML; MG/100ML; MG/100ML
125 INJECTION, SOLUTION INTRAVENOUS CONTINUOUS
Status: DISCONTINUED | OUTPATIENT
Start: 2023-05-10 | End: 2023-05-14 | Stop reason: HOSPADM

## 2023-05-10 RX ORDER — LIDOCAINE HYDROCHLORIDE AND EPINEPHRINE 15; 5 MG/ML; UG/ML
INJECTION, SOLUTION EPIDURAL AS NEEDED
Status: DISCONTINUED | OUTPATIENT
Start: 2023-05-10 | End: 2023-05-10

## 2023-05-10 RX ORDER — ROPIVACAINE HYDROCHLORIDE 2 MG/ML
INJECTION, SOLUTION EPIDURAL; INFILTRATION; PERINEURAL AS NEEDED
Status: DISCONTINUED | OUTPATIENT
Start: 2023-05-10 | End: 2023-05-11

## 2023-05-10 RX ORDER — BUTORPHANOL TARTRATE 1 MG/ML
1 INJECTION, SOLUTION INTRAMUSCULAR; INTRAVENOUS ONCE
Status: COMPLETED | OUTPATIENT
Start: 2023-05-10 | End: 2023-05-10

## 2023-05-10 RX ORDER — ONDANSETRON 2 MG/ML
4 INJECTION INTRAMUSCULAR; INTRAVENOUS EVERY 4 HOURS PRN
Status: DISCONTINUED | OUTPATIENT
Start: 2023-05-10 | End: 2023-05-11

## 2023-05-10 RX ORDER — OXYTOCIN/RINGER'S LACTATE 30/500 ML
1-30 PLASTIC BAG, INJECTION (ML) INTRAVENOUS
Status: DISCONTINUED | OUTPATIENT
Start: 2023-05-10 | End: 2023-05-11

## 2023-05-10 RX ORDER — LIDOCAINE HYDROCHLORIDE 10 MG/ML
INJECTION, SOLUTION EPIDURAL; INFILTRATION; INTRACAUDAL; PERINEURAL AS NEEDED
Status: DISCONTINUED | OUTPATIENT
Start: 2023-05-10 | End: 2023-05-11

## 2023-05-10 RX ORDER — ACETAMINOPHEN 325 MG/1
650 TABLET ORAL EVERY 6 HOURS PRN
Status: DISCONTINUED | OUTPATIENT
Start: 2023-05-10 | End: 2023-05-11

## 2023-05-10 RX ORDER — BUPIVACAINE HYDROCHLORIDE 2.5 MG/ML
30 INJECTION, SOLUTION EPIDURAL; INFILTRATION; INTRACAUDAL ONCE AS NEEDED
Status: DISCONTINUED | OUTPATIENT
Start: 2023-05-10 | End: 2023-05-11

## 2023-05-10 RX ADMIN — LIDOCAINE HYDROCHLORIDE 5 MG: 10 INJECTION, SOLUTION EPIDURAL; INFILTRATION; INTRACAUDAL; PERINEURAL at 20:50

## 2023-05-10 RX ADMIN — ONDANSETRON 4 MG: 2 INJECTION INTRAMUSCULAR; INTRAVENOUS at 17:34

## 2023-05-10 RX ADMIN — PENICILLIN G POTASSIUM 2.5 MILLION UNITS: 20000000 INJECTION, POWDER, FOR SOLUTION INTRAVENOUS at 23:40

## 2023-05-10 RX ADMIN — PENICILLIN G POTASSIUM 5 MILLION UNITS: 20000000 INJECTION, POWDER, FOR SOLUTION INTRAVENOUS at 19:26

## 2023-05-10 RX ADMIN — SODIUM CHLORIDE, SODIUM LACTATE, POTASSIUM CHLORIDE, AND CALCIUM CHLORIDE 125 ML/HR: .6; .31; .03; .02 INJECTION, SOLUTION INTRAVENOUS at 16:58

## 2023-05-10 RX ADMIN — SODIUM CHLORIDE, SODIUM LACTATE, POTASSIUM CHLORIDE, AND CALCIUM CHLORIDE 125 ML/HR: .6; .31; .03; .02 INJECTION, SOLUTION INTRAVENOUS at 18:23

## 2023-05-10 RX ADMIN — LIDOCAINE HYDROCHLORIDE AND EPINEPHRINE 2 ML: 15; 5 INJECTION, SOLUTION EPIDURAL at 20:59

## 2023-05-10 RX ADMIN — ROPIVACAINE HYDROCHLORIDE: 2 INJECTION, SOLUTION EPIDURAL; INFILTRATION at 21:00

## 2023-05-10 RX ADMIN — LIDOCAINE HYDROCHLORIDE AND EPINEPHRINE 3 ML: 15; 5 INJECTION, SOLUTION EPIDURAL at 20:57

## 2023-05-10 RX ADMIN — ROPIVACAINE HYDROCHLORIDE 2 ML: 2 INJECTION, SOLUTION EPIDURAL; INFILTRATION at 20:55

## 2023-05-10 RX ADMIN — ACETAMINOPHEN 650 MG: 325 TABLET ORAL at 20:09

## 2023-05-10 RX ADMIN — SODIUM CHLORIDE, SODIUM LACTATE, POTASSIUM CHLORIDE, AND CALCIUM CHLORIDE 125 ML/HR: .6; .31; .03; .02 INJECTION, SOLUTION INTRAVENOUS at 17:52

## 2023-05-10 RX ADMIN — BUTORPHANOL TARTRATE 1 MG: 1 INJECTION, SOLUTION INTRAMUSCULAR; INTRAVENOUS at 17:26

## 2023-05-10 RX ADMIN — ONDANSETRON 4 MG: 2 INJECTION INTRAMUSCULAR; INTRAVENOUS at 23:40

## 2023-05-10 NOTE — H&P
[address] 32 y o  female MRN: [id number]  Unit/Bed#: -01 Encounter: [id number]    Assessment: 32 y o   at 40w4d admitted for eIOL  SVE: ***  FHT: Cat I  Clinical EFW: 7 ; Cephalic confirmed by ultrasound  GBS status: pos   Postpartum contraception plan: ***    Plan:   * 40 weeks gestation of pregnancy  Assessment & Plan  Admit   T&S, CBC, RPR  CLD  IV fluids  GBS prophylaxis is needed, PCN ordered  Induction with         Discussed case and plan w/ Dr Korin Martínez      Chief Complaint: Flor Section    HPI: Orlando Alvarado is a 32 y o  Eliecer Choi with an JOLIE of 2023, by Last Menstrual Period at 40w4d who is being admitted for eIOL  She {gen contractions:514277}, has {scmLOF:92264}, and reports {scmobvb:78188}  She {scmfm:94385}    Patient Active Problem List   Diagnosis   • Fetal alcohol syndrome   • GERD (gastroesophageal reflux disease)   • Mild intellectual disability   • Major depression, recurrent, chronic ([facility])   • Migraine without aura, not intractable   • Anxiety and depression   • Acute thoracic myofascial strain   • Chronic midline low back pain without sciatica   • Urinary frequency   • 40 weeks gestation of pregnancy   • Major depressive disorder, recurrent 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 psychotic features ([address] )   • PTSD (post-traumatic stress disorder)   • Anemia of mother in pregnancy, antepartum, third trimester   • Maternal morbid obesity in third trimester, antepartum ([address] )   • Polyhydramnios in third trimester   • Positive GBS test   • Contraception management       Baby complications/comments: polyhydramnios    Review of Systems   Constitutional: Negative for chills and fever  Respiratory: Negative for cough and shortness of breath  Cardiovascular: Negative for chest pain and leg swelling  Gastrointestinal: Negative for abdominal pain, nausea and vomiting  Genitourinary: Negative for dysuria, pelvic pain, urgency, vaginal bleeding and vaginal discharge  Neurological: Negative for dizziness, light-headedness and headaches  All other systems reviewed and are negative  OB Hx:  OB History    Para Term  AB Living   2 0 0 0 1 0   SAB IAB Ectopic Multiple Live Births   0 0 1 0 0      # Outcome Date GA Lbr Rosas/2nd Weight Sex Delivery Anes PTL Lv   2 Current            1 Ectopic               Obstetric Comments   Ectopic pregnancy ~2019       Past Medical Hx:  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   • Personality disorder (HCC)        Past Surgical hx:  Past Surgical History:   Procedure Laterality Date   • NO PAST SURGERIES         Social Hx:  Alcohol use: no  Tobacco use: no  Other substance use: no    No Known Allergies    Medications Prior to Admission   Medication   • Ascorbic Acid (VITAMIN C PO)   • famotidine (PEPCID) 20 mg tablet   • FERROUS SULFATE PO   • ondansetron (ZOFRAN-ODT) 4 mg disintegrating tablet   • prenatal vitamin (CLASSIC FORMULA) 27-0 8 mg   • sertraline (ZOLOFT) 100 mg tablet   • SUMAtriptan (IMITREX) 25 mg tablet       Objective: There is no height or weight on file to calculate BMI  Physical Exam:  Physical Exam  Constitutional:       Appearance: Normal appearance  Cardiovascular:      Rate and Rhythm: Normal rate and regular rhythm  Heart sounds: No murmur heard  No friction rub  No gallop  Pulmonary:      Effort: Pulmonary effort is normal  No respiratory distress  Breath sounds: No wheezing  Abdominal:      Palpations: Abdomen is soft  Tenderness: There is no abdominal tenderness  Musculoskeletal:         General: No swelling or tenderness  Neurological:      Mental Status: She is alert and oriented to person, place, and time  Skin:     General: Skin is warm and dry  Psychiatric:         Mood and Affect: Mood normal    Vitals reviewed  FHT:       TOCO:        Lab Results   Component Value Date    WBC 9 57 04/25/2023    HGB 10 3 (L) 04/25/2023    HCT 32 1 (L) 04/25/2023     04/25/2023     Lab Results   Component Value Date     08/27/2015    K 3 9 08/27/2015     08/27/2015    CO2 24 8 08/27/2015    BUN 6 08/27/2015    CREATININE 0 76 08/27/2015    GLUCOSE 75 08/27/2015    AST 13 01/23/2015    ALT 17 01/23/2015     Prenatal Labs: Reviewed      >2 Midnights  INPATIENT     Signature/Title:  Helena Castellanos MD  Date: 5/10/2023  Time: 3:51 PM

## 2023-05-10 NOTE — OB LABOR/OXYTOCIN SAFETY PROGRESS
Labor Progress Note - Eleanor Ratna 32 y o  female MRN: [id number]    Unit/Bed#: -01 Encounter: [id number]       Contraction Frequency (minutes): 2  Contraction Quality: Mild  Tachysystole: No   Cervical Dilation: 4-5        Cervical Effacement: 70  Fetal Station: -2  Baseline Rate: 115 bpm  Fetal Heart Rate: 136 BPM  FHR Category: Category I               Vital Signs:   Vitals:    05/10/23 1929   BP: 114/59   Pulse: 71   Resp: 16   Temp:    SpO2:        Notes/comments: Dodd balloon out, SVE as above  FHT reactive  Plan to start pitocin       D/w Dr Carissa Dickey MD 5/10/2023 7:58 PM

## 2023-05-10 NOTE — OB LABOR/OXYTOCIN SAFETY PROGRESS
Labor Progress Note - Tanmay Gonzales 32 y o  female MRN: [id number]    Unit/Bed#: -01 Encounter: [id number]       Contraction Frequency (minutes): 0         Cervical Dilation: 1-2        Cervical Effacement: 50  Fetal Station: -2  Baseline Rate: 125 bpm  Fetal Heart Rate: 128 BPM  FHR Category: Category I               Vital Signs:   Vitals:    05/10/23 1607   BP: 128/74   Pulse: 81   Resp: 16   Temp: 98 8 °F (37 1 °C)       Notes/comments:     PROCEDURE:  WIN BALLOON PLACEMENT    A 24F win with a 30cc balloon was selected, SVE was performed and cervix was located, win was introduced over sterile gloved hands  Balloon advanced through cervix beyond the internal cervical os  A small amount amount of sterile saline solution was instilled in the balloon to confirm placement  Placement was confirmed to be beyond the internal cervical os  A total of 60cc of sterile saline solution was placed into the balloon  Pt tolerated well  Instructions left with RN to place win to gravity with a 1L bag of IV fluid  Notify DO when win dislodged      DO Burt Paz DO 5/10/2023 4:56 PM

## 2023-05-10 NOTE — OB LABOR/OXYTOCIN SAFETY PROGRESS
Labor Progress Note - Debbie Brooks 32 y o  female MRN: [id number]    Unit/Bed#: -01 Encounter: [id number]       Contraction Frequency (minutes): 0 (irritable)         Cervical Dilation: 1-2        Cervical Effacement: 50  Fetal Station: -2  Baseline Rate: 130 bpm  Fetal Heart Rate: 142 BPM  FHR Category: Category II               Vital Signs:   Vitals:    05/10/23 1809   BP: 137/78   Pulse: (!) 112   Resp:    Temp:    SpO2:        Notes/comments: Dodd remains in place  Period of discontinuous monitoring due to patient vomiting and subsequent fetal movement  On bedside ultrasound, FHR at 110bpm  External monitoring continuous after repositioning, with baseline 110bpm, moderate variability  Fluids bolused  Continue to monitor         D/w Dr Kathy Spangler MD 5/10/2023 6:28 PM

## 2023-05-10 NOTE — H&P
H & P- Obstetrics   Ele Trivedi 32 y o  female MRN: [id number]  Unit/Bed#: -01 Encounter: [id number]      Assessment/Plan:    32 y o   at 40w4d admitted for an elective induction of labor    SVE: Cervical Dilation: 1-2  Cervical Effacement: 48  Fetal Station: -2  Presentation: Vertex  Method: Manual  OB Examiner: Dr Etta Graves    Anemia of mother in pregnancy, antepartum, third trimester  Assessment & Plan  Last Hgb 10 3  Venofer if PPH    Major depressive disorder, recurrent 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 psychotic features ([facility] )  Assessment & Plan  Good support from   Continue Zoloft    Fetal alcohol syndrome  Assessment & Plan  Associated intellectual disability  Has  with her for support    * 40 weeks gestation of pregnancy  Assessment & Plan  Admit   T&S, CBC, RPR  CLD  IV fluids  GBS prophylaxis is needed, PCN ordered  Induction with              Patient of: Elliott Hawkins  This patient will be an INPATIENT  and I certify the anticipated length of stay is >2 Midnights  Discussed with Dr Walden Bence:    Chief Complaint: I am here for my induction    HPI: Ele Trivedi is a 32 y o  Vicenta Latch with an JOLIE of 2023, by Last Menstrual Period at 40w4d who is being admitted for IOL - elective  She complains of uterine contractions, occurring occasionally and do not hurt, has no LOF, and reports no VB  She states she has felt good FM  Kang Perales This pregnancy is complicated by the above problem list  All other review of systems is negative         Patient Active Problem List   Diagnosis   • Fetal alcohol syndrome   • GERD (gastroesophageal reflux disease)   • Mild intellectual disability   • Major depression, recurrent, chronic (HCC)   • Migraine without aura, not intractable   • Anxiety and depression   • Acute thoracic myofascial strain   • Chronic midline low back pain without sciatica   • Urinary frequency   • 40 weeks gestation of pregnancy   • Major depressive disorder, recurrent 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 psychotic features ([facility] )   • PTSD (post-traumatic stress disorder)   • Anemia of mother in pregnancy, antepartum, third trimester   • Maternal morbid obesity in third trimester, antepartum ([facility] )   • Polyhydramnios in third trimester   • Positive GBS test   • Contraception management       OB History    Para Term  AB Living   2 0 0 0 1 0   SAB IAB Ectopic Multiple Live Births   0 0 1 0 0      # Outcome Date GA Lbr Rosas/2nd Weight Sex Delivery Anes PTL Lv   2 Current            1 Ectopic               Obstetric Comments   Ectopic pregnancy ~2019       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   • Personality disorder (HCC)        Past Surgical History:   Procedure Laterality Date   • NO PAST SURGERIES         Social History     Tobacco Use   • Smoking status: Never   • Smokeless tobacco: Never   Substance Use Topics   • Alcohol use: Not Currently     Comment: not since confiemed preg       No Known Allergies    Medications Prior to Admission   Medication   • Ascorbic Acid (VITAMIN C PO)   • famotidine (PEPCID) 20 mg tablet   • FERROUS SULFATE PO   • ondansetron (ZOFRAN-ODT) 4 mg disintegrating tablet   • prenatal vitamin (CLASSIC FORMULA) 27-0 8 mg   • sertraline (ZOLOFT) 100 mg tablet   • SUMAtriptan (IMITREX) 25 mg tablet           OBJECTIVE:  Vitals:  Temp:  [98 8 °F (37 1 °C)] 98 8 °F (37 1 °C)  HR:  [81] 81  Resp:  [16] 16  BP: (128)/(74) 128/74  There is no height or weight on file to calculate BMI  Physical Exam:  General: Well appearing, no distress  Respiratory: Unlabored breathing  Cardiovascular: Regular rate  Abdomen: Soft, gravid, nontender  Fundal Height: Appropriate for gestational age  Extremities: Warm and well perfused  Non tender    Psychiatric: Behavioral normal        FHT:  Baseline Rate: 130 "bpm  Variability: Moderate 6-25 bpm  Accelerations: 15 x 15 or greater, At variable times  Decelerations: None  FHR Category: Category I    TOCO:   Contraction Frequency (minutes): 0 (irritable)      Prenatal Labs:   Blood Type:   Lab Results   Component Value Date/Time    ABO Grouping A 10/27/2022 10:28 AM     , D (Rh type):   Lab Results   Component Value Date/Time    Rh Factor Positive 10/27/2022 10:28 AM     , HCT/HGB:   Lab Results   Component Value Date/Time    Hematocrit 32 1 (L) 04/25/2023 10:15 AM    Hematocrit 41 9 08/27/2015 02:44 PM    Hemoglobin 10 3 (L) 04/25/2023 10:15 AM    Hemoglobin 14 3 08/27/2015 02:44 PM      , Platelets:   Lab Results   Component Value Date/Time    Platelets 016 42/55/1960 10:15 AM    Platelets 143 43/98/9585 02:44 PM      , 1 hour Glucola:   Lab Results   Component Value Date/Time    Glucose 117 02/08/2023 12:13 PM   , Varicella: No results found for: VARICELLAIGG    , Rubella:   Lab Results   Component Value Date/Time    Rubella IgG Quant 135 2 10/24/2022 11:51 AM        , VDRL/RPR:   Lab Results   Component Value Date/Time    RPR Non-Reactive 02/08/2023 12:13 PM      , Hep B:   Lab Results   Component Value Date/Time    Hepatitis B Surface Ag Non-reactive 10/24/2022 11:51 AM     , HIV:   Lab Results   Component Value Date/Time    HIV-1/HIV-2 Ab Non-Reactive 10/24/2022 11:51 AM     , Group B Strep:    Lab Results   Component Value Date/Time    Strep Grp B PCR Positive (A) 04/10/2023 04:43 PM            Armando Mccartney DO  OB/GYN PGY-3  5/10/2023  5:01 PM        Portions of the record may have been created with voice recognition software  Occasional wrong word or \"sound a like\" substitutions may have occurred due to the inherent limitations of voice recognition software    Read the chart carefully and recognize, using context, where substitutions have occurred    "

## 2023-05-11 PROBLEM — Z98.891 S/P PRIMARY LOW TRANSVERSE C-SECTION: Status: ACTIVE | Noted: 2023-05-11

## 2023-05-11 PROBLEM — Z98.891 STATUS POST PRIMARY LOW TRANSVERSE CESAREAN SECTION: Status: ACTIVE | Noted: 2023-05-11

## 2023-05-11 LAB
BASE EXCESS BLDCOV CALC-SCNC: -2.1 MMOL/L (ref 1–9)
HCO3 BLDCOV-SCNC: 23.6 MMOL/L (ref 12.2–28.6)
OXYHGB MFR BLDCOV: 62.3 %
PCO2 BLDCOV: 43.4 MM HG (ref 27–43)
PH BLDCOV: 7.35 [PH] (ref 7.19–7.49)
PO2 BLDCOV: 26.5 MM HG (ref 15–45)
SAO2 % BLDCOV: 13.8 ML/DL

## 2023-05-11 RX ORDER — SERTRALINE HYDROCHLORIDE 100 MG/1
100 TABLET, FILM COATED ORAL DAILY
Status: DISCONTINUED | OUTPATIENT
Start: 2023-05-11 | End: 2023-05-14 | Stop reason: HOSPADM

## 2023-05-11 RX ORDER — DIPHENHYDRAMINE HCL 25 MG
25 TABLET ORAL EVERY 6 HOURS PRN
Status: DISCONTINUED | OUTPATIENT
Start: 2023-05-11 | End: 2023-05-14 | Stop reason: HOSPADM

## 2023-05-11 RX ORDER — ONDANSETRON 2 MG/ML
4 INJECTION INTRAMUSCULAR; INTRAVENOUS ONCE AS NEEDED
Status: DISCONTINUED | OUTPATIENT
Start: 2023-05-11 | End: 2023-05-14 | Stop reason: HOSPADM

## 2023-05-11 RX ORDER — DEXAMETHASONE SODIUM PHOSPHATE 10 MG/ML
INJECTION, SOLUTION INTRAMUSCULAR; INTRAVENOUS AS NEEDED
Status: DISCONTINUED | OUTPATIENT
Start: 2023-05-11 | End: 2023-05-11

## 2023-05-11 RX ORDER — DIPHENHYDRAMINE HYDROCHLORIDE 50 MG/ML
INJECTION INTRAMUSCULAR; INTRAVENOUS AS NEEDED
Status: DISCONTINUED | OUTPATIENT
Start: 2023-05-11 | End: 2023-05-11

## 2023-05-11 RX ORDER — DOCUSATE SODIUM 100 MG/1
100 CAPSULE, LIQUID FILLED ORAL 2 TIMES DAILY
Status: DISCONTINUED | OUTPATIENT
Start: 2023-05-11 | End: 2023-05-14 | Stop reason: HOSPADM

## 2023-05-11 RX ORDER — METOCLOPRAMIDE HYDROCHLORIDE 5 MG/ML
INJECTION INTRAMUSCULAR; INTRAVENOUS AS NEEDED
Status: DISCONTINUED | OUTPATIENT
Start: 2023-05-11 | End: 2023-05-11

## 2023-05-11 RX ORDER — METOCLOPRAMIDE HYDROCHLORIDE 5 MG/ML
10 INJECTION INTRAMUSCULAR; INTRAVENOUS ONCE
Status: COMPLETED | OUTPATIENT
Start: 2023-05-11 | End: 2023-05-11

## 2023-05-11 RX ORDER — OXYTOCIN/RINGER'S LACTATE 30/500 ML
62.5 PLASTIC BAG, INJECTION (ML) INTRAVENOUS ONCE
Status: COMPLETED | OUTPATIENT
Start: 2023-05-11 | End: 2023-05-11

## 2023-05-11 RX ORDER — FERROUS SULFATE 325(65) MG
325 TABLET ORAL
Status: DISCONTINUED | OUTPATIENT
Start: 2023-05-12 | End: 2023-05-14 | Stop reason: HOSPADM

## 2023-05-11 RX ORDER — KETOROLAC TROMETHAMINE 30 MG/ML
30 INJECTION, SOLUTION INTRAMUSCULAR; INTRAVENOUS EVERY 6 HOURS
Status: DISPENSED | OUTPATIENT
Start: 2023-05-11 | End: 2023-05-12

## 2023-05-11 RX ORDER — LIDOCAINE HYDROCHLORIDE AND EPINEPHRINE 20; 5 MG/ML; UG/ML
INJECTION, SOLUTION EPIDURAL; INFILTRATION; INTRACAUDAL; PERINEURAL AS NEEDED
Status: DISCONTINUED | OUTPATIENT
Start: 2023-05-11 | End: 2023-05-11

## 2023-05-11 RX ORDER — CALCIUM CARBONATE 200(500)MG
1000 TABLET,CHEWABLE ORAL DAILY PRN
Status: DISCONTINUED | OUTPATIENT
Start: 2023-05-11 | End: 2023-05-14 | Stop reason: HOSPADM

## 2023-05-11 RX ORDER — FAMOTIDINE 20 MG/1
20 TABLET, FILM COATED ORAL 2 TIMES DAILY
Status: DISCONTINUED | OUTPATIENT
Start: 2023-05-11 | End: 2023-05-14 | Stop reason: HOSPADM

## 2023-05-11 RX ORDER — OXYCODONE HYDROCHLORIDE 5 MG/1
5 TABLET ORAL EVERY 4 HOURS PRN
Status: DISCONTINUED | OUTPATIENT
Start: 2023-05-12 | End: 2023-05-14 | Stop reason: HOSPADM

## 2023-05-11 RX ORDER — FENTANYL CITRATE 50 UG/ML
INJECTION, SOLUTION INTRAMUSCULAR; INTRAVENOUS AS NEEDED
Status: DISCONTINUED | OUTPATIENT
Start: 2023-05-11 | End: 2023-05-11

## 2023-05-11 RX ORDER — NALOXONE HYDROCHLORIDE 0.4 MG/ML
0.1 INJECTION, SOLUTION INTRAMUSCULAR; INTRAVENOUS; SUBCUTANEOUS
Status: ACTIVE | OUTPATIENT
Start: 2023-05-11 | End: 2023-05-12

## 2023-05-11 RX ORDER — ACETAMINOPHEN 325 MG/1
650 TABLET ORAL EVERY 6 HOURS PRN
Status: ACTIVE | OUTPATIENT
Start: 2023-05-11 | End: 2023-05-12

## 2023-05-11 RX ORDER — SIMETHICONE 80 MG
80 TABLET,CHEWABLE ORAL 4 TIMES DAILY PRN
Status: DISCONTINUED | OUTPATIENT
Start: 2023-05-11 | End: 2023-05-14 | Stop reason: HOSPADM

## 2023-05-11 RX ORDER — KETOROLAC TROMETHAMINE 30 MG/ML
INJECTION, SOLUTION INTRAMUSCULAR; INTRAVENOUS AS NEEDED
Status: DISCONTINUED | OUTPATIENT
Start: 2023-05-11 | End: 2023-05-11

## 2023-05-11 RX ORDER — IBUPROFEN 600 MG/1
600 TABLET ORAL EVERY 6 HOURS PRN
Status: DISCONTINUED | OUTPATIENT
Start: 2023-05-12 | End: 2023-05-12

## 2023-05-11 RX ORDER — NALBUPHINE HCL 10 MG/ML
5 AMPUL (ML) INJECTION
Status: ACTIVE | OUTPATIENT
Start: 2023-05-11 | End: 2023-05-12

## 2023-05-11 RX ORDER — ACETAMINOPHEN 325 MG/1
650 TABLET ORAL EVERY 6 HOURS SCHEDULED
Status: DISCONTINUED | OUTPATIENT
Start: 2023-05-11 | End: 2023-05-14 | Stop reason: HOSPADM

## 2023-05-11 RX ORDER — MORPHINE SULFATE 0.5 MG/ML
INJECTION, SOLUTION EPIDURAL; INTRATHECAL; INTRAVENOUS AS NEEDED
Status: DISCONTINUED | OUTPATIENT
Start: 2023-05-11 | End: 2023-05-11

## 2023-05-11 RX ORDER — HYDROMORPHONE HCL/PF 1 MG/ML
0.5 SYRINGE (ML) INJECTION EVERY 2 HOUR PRN
Status: DISCONTINUED | OUTPATIENT
Start: 2023-05-11 | End: 2023-05-14 | Stop reason: HOSPADM

## 2023-05-11 RX ORDER — ONDANSETRON 2 MG/ML
INJECTION INTRAMUSCULAR; INTRAVENOUS AS NEEDED
Status: DISCONTINUED | OUTPATIENT
Start: 2023-05-11 | End: 2023-05-11

## 2023-05-11 RX ORDER — FENTANYL CITRATE/PF 50 MCG/ML
50 SYRINGE (ML) INJECTION
Status: DISCONTINUED | OUTPATIENT
Start: 2023-05-11 | End: 2023-05-14 | Stop reason: HOSPADM

## 2023-05-11 RX ORDER — ENOXAPARIN SODIUM 100 MG/ML
40 INJECTION SUBCUTANEOUS EVERY 12 HOURS SCHEDULED
Status: DISCONTINUED | OUTPATIENT
Start: 2023-05-12 | End: 2023-05-14 | Stop reason: HOSPADM

## 2023-05-11 RX ORDER — OXYCODONE HYDROCHLORIDE 10 MG/1
10 TABLET ORAL EVERY 4 HOURS PRN
Status: DISCONTINUED | OUTPATIENT
Start: 2023-05-12 | End: 2023-05-14 | Stop reason: HOSPADM

## 2023-05-11 RX ORDER — ONDANSETRON 2 MG/ML
4 INJECTION INTRAMUSCULAR; INTRAVENOUS EVERY 8 HOURS PRN
Status: DISCONTINUED | OUTPATIENT
Start: 2023-05-11 | End: 2023-05-14 | Stop reason: HOSPADM

## 2023-05-11 RX ORDER — HYDROMORPHONE HCL/PF 1 MG/ML
0.2 SYRINGE (ML) INJECTION
Status: DISCONTINUED | OUTPATIENT
Start: 2023-05-11 | End: 2023-05-14 | Stop reason: HOSPADM

## 2023-05-11 RX ORDER — OXYTOCIN/RINGER'S LACTATE 30/500 ML
PLASTIC BAG, INJECTION (ML) INTRAVENOUS AS NEEDED
Status: DISCONTINUED | OUTPATIENT
Start: 2023-05-11 | End: 2023-05-11

## 2023-05-11 RX ORDER — CEFAZOLIN SODIUM 1 G/3ML
INJECTION, POWDER, FOR SOLUTION INTRAMUSCULAR; INTRAVENOUS AS NEEDED
Status: DISCONTINUED | OUTPATIENT
Start: 2023-05-11 | End: 2023-05-11

## 2023-05-11 RX ADMIN — PENICILLIN G POTASSIUM 2.5 MILLION UNITS: 20000000 INJECTION, POWDER, FOR SOLUTION INTRAVENOUS at 07:45

## 2023-05-11 RX ADMIN — FAMOTIDINE 20 MG: 20 TABLET, FILM COATED ORAL at 17:58

## 2023-05-11 RX ADMIN — SODIUM CHLORIDE, SODIUM LACTATE, POTASSIUM CHLORIDE, AND CALCIUM CHLORIDE 999 ML/HR: .6; .31; .03; .02 INJECTION, SOLUTION INTRAVENOUS at 02:18

## 2023-05-11 RX ADMIN — ONDANSETRON 4 MG: 2 INJECTION INTRAMUSCULAR; INTRAVENOUS at 07:45

## 2023-05-11 RX ADMIN — ROPIVACAINE HYDROCHLORIDE: 2 INJECTION, SOLUTION EPIDURAL; INFILTRATION at 04:34

## 2023-05-11 RX ADMIN — KETOROLAC TROMETHAMINE 30 MG: 30 INJECTION, SOLUTION INTRAMUSCULAR at 09:31

## 2023-05-11 RX ADMIN — KETOROLAC TROMETHAMINE 30 MG: 30 INJECTION, SOLUTION INTRAMUSCULAR; INTRAVENOUS at 16:14

## 2023-05-11 RX ADMIN — CEFAZOLIN 2000 MG: 1 INJECTION, POWDER, FOR SOLUTION INTRAMUSCULAR; INTRAVENOUS at 08:08

## 2023-05-11 RX ADMIN — Medication 30 UNITS: at 08:27

## 2023-05-11 RX ADMIN — Medication 2 MILLI-UNITS/MIN: at 01:42

## 2023-05-11 RX ADMIN — ONDANSETRON 4 MG: 2 INJECTION INTRAMUSCULAR; INTRAVENOUS at 08:06

## 2023-05-11 RX ADMIN — SERTRALINE HYDROCHLORIDE 100 MG: 100 TABLET ORAL at 12:20

## 2023-05-11 RX ADMIN — FAMOTIDINE 20 MG: 20 TABLET, FILM COATED ORAL at 12:15

## 2023-05-11 RX ADMIN — SODIUM CHLORIDE, SODIUM LACTATE, POTASSIUM CHLORIDE, AND CALCIUM CHLORIDE 245 ML/HR: .6; .31; .03; .02 INJECTION, SOLUTION INTRAVENOUS at 05:23

## 2023-05-11 RX ADMIN — FENTANYL CITRATE 100 MCG: 50 INJECTION, SOLUTION INTRAMUSCULAR; INTRAVENOUS at 07:57

## 2023-05-11 RX ADMIN — LIDOCAINE HYDROCHLORIDE,EPINEPHRINE BITARTRATE 5 ML: 20; .005 INJECTION, SOLUTION EPIDURAL; INFILTRATION; INTRACAUDAL; PERINEURAL at 08:05

## 2023-05-11 RX ADMIN — METOCLOPRAMIDE 10 MG: 5 INJECTION, SOLUTION INTRAMUSCULAR; INTRAVENOUS at 08:09

## 2023-05-11 RX ADMIN — SODIUM CHLORIDE, SODIUM LACTATE, POTASSIUM CHLORIDE, AND CALCIUM CHLORIDE 125 ML/HR: .6; .31; .03; .02 INJECTION, SOLUTION INTRAVENOUS at 15:38

## 2023-05-11 RX ADMIN — LIDOCAINE HYDROCHLORIDE,EPINEPHRINE BITARTRATE 5 ML: 20; .005 INJECTION, SOLUTION EPIDURAL; INFILTRATION; INTRACAUDAL; PERINEURAL at 08:15

## 2023-05-11 RX ADMIN — LIDOCAINE HYDROCHLORIDE,EPINEPHRINE BITARTRATE 5 ML: 20; .005 INJECTION, SOLUTION EPIDURAL; INFILTRATION; INTRACAUDAL; PERINEURAL at 07:57

## 2023-05-11 RX ADMIN — DIPHENHYDRAMINE HYDROCHLORIDE 25 MG: 50 INJECTION, SOLUTION INTRAMUSCULAR; INTRAVENOUS at 08:53

## 2023-05-11 RX ADMIN — KETOROLAC TROMETHAMINE 30 MG: 30 INJECTION, SOLUTION INTRAMUSCULAR; INTRAVENOUS at 23:30

## 2023-05-11 RX ADMIN — MORPHINE SULFATE 3 MG: 0.5 INJECTION, SOLUTION EPIDURAL; INTRATHECAL; INTRAVENOUS at 08:30

## 2023-05-11 RX ADMIN — ACETAMINOPHEN 650 MG: 325 TABLET ORAL at 12:15

## 2023-05-11 RX ADMIN — ACETAMINOPHEN 650 MG: 325 TABLET ORAL at 17:58

## 2023-05-11 RX ADMIN — METOCLOPRAMIDE 10 MG: 5 INJECTION, SOLUTION INTRAMUSCULAR; INTRAVENOUS at 00:14

## 2023-05-11 RX ADMIN — SODIUM CHLORIDE, SODIUM LACTATE, POTASSIUM CHLORIDE, AND CALCIUM CHLORIDE 125 ML/HR: .6; .31; .03; .02 INJECTION, SOLUTION INTRAVENOUS at 00:01

## 2023-05-11 RX ADMIN — SODIUM CHLORIDE, SODIUM LACTATE, POTASSIUM CHLORIDE, AND CALCIUM CHLORIDE 300 ML: .6; .31; .03; .02 INJECTION, SOLUTION INTRAVENOUS at 02:40

## 2023-05-11 RX ADMIN — PENICILLIN G POTASSIUM 2.5 MILLION UNITS: 20000000 INJECTION, POWDER, FOR SOLUTION INTRAVENOUS at 03:48

## 2023-05-11 RX ADMIN — LIDOCAINE HYDROCHLORIDE,EPINEPHRINE BITARTRATE 5 ML: 20; .005 INJECTION, SOLUTION EPIDURAL; INFILTRATION; INTRACAUDAL; PERINEURAL at 08:50

## 2023-05-11 RX ADMIN — Medication 62.5 MILLI-UNITS/MIN: at 10:07

## 2023-05-11 RX ADMIN — DOCUSATE SODIUM 100 MG: 100 CAPSULE, LIQUID FILLED ORAL at 17:58

## 2023-05-11 RX ADMIN — Medication 500 MG: at 08:05

## 2023-05-11 RX ADMIN — DEXAMETHASONE SODIUM PHOSPHATE 10 MG: 10 INJECTION, SOLUTION INTRAMUSCULAR; INTRAVENOUS at 08:06

## 2023-05-11 NOTE — OB LABOR/OXYTOCIN SAFETY PROGRESS
Oxytocin Safety Progress Check Note - Krishna Been 32 y o  female MRN: [id number]    Unit/Bed#: -01 Encounter: [id number]    Dose (michelle-units/min) Oxytocin: 2 michelle-units/min  Contraction Frequency (minutes): 2-4  Contraction Quality: Moderate  Tachysystole: No   Cervical Dilation: 6        Cervical Effacement: 80  Fetal Station: -2  Baseline Rate: 125 bpm  Fetal Heart Rate: 131 BPM  FHR Category: Category II               Vital Signs:   Vitals:    05/11/23 0200   BP: 116/67   Pulse: 71   Resp:    Temp:    SpO2:        Notes/comments: Patient having recurrent late vs variable decelerations on 2 of pitocin  Pitocin held, patient repositioned, and fluids bolused  Discussed with patient that [address] has been Cat II for some, and we may need to proceed for CS if Cat II tracing persists  Will start amnioinfusion and continue to monitor with pitocin held         Dr Ricco Emery at bedside  Mattie Dobbins MD 5/11/2023 2:33 AM

## 2023-05-11 NOTE — OB LABOR/OXYTOCIN SAFETY PROGRESS
Oxytocin Safety Progress Check Note - Davey Parra 32 y o  female MRN: [id number]    Unit/Bed#: - Encounter: [id number]    Dose (michelle-units/min) Oxytocin: 2 michelle-units/min  Contraction Frequency (minutes): 2-4  Contraction Quality: Moderate  Tachysystole: No   Cervical Dilation: 6        Cervical Effacement: 80  Fetal Station: -2  Baseline Rate: 125 bpm  Fetal Heart Rate: 131 BPM  FHR Category: Category II               Vital Signs:   Vitals:    23 0200   BP: 116/67   Pulse: 71   Resp:    Temp:    SpO2:        Notes/comments:       REVIEW OF CURRENT PLAN OF CARE AND MANAGEMENT: The tracing is Category 2 with the presence of moderate variability  There have been significant decelerations with 50% or more of the contractions in the last hour  The patient is in the active phase of labor  Labor progress shows exam unchanged over last 2 exams  Pitocin was at 2  Now turned off  Decels appear late, some variables  Will try amnioinfusion  If no improvement recommend moving forward w/   Discussed w/ pt and she expresses understanding           Danitza Haji MD 2023 2:44 AM

## 2023-05-11 NOTE — ANESTHESIA PREPROCEDURE EVALUATION
Procedure:  LABOR ANALGESIA    Relevant Problems   CARDIO   (+) Migraine without aura, not intractable      GI/HEPATIC   (+) GERD (gastroesophageal reflux disease)      GYN   (+) 40 weeks gestation of pregnancy      HEMATOLOGY   (+) Anemia of mother in pregnancy, antepartum, third trimester      MUSCULOSKELETAL   (+) Acute thoracic myofascial strain   (+) Chronic midline low back pain without sciatica      NEURO/PSYCH   (+) Anxiety and depression   (+) Chronic midline low back pain without sciatica   (+) Major depression, recurrent, chronic (HCC)   (+) Major depressive disorder, recurrent 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 psychotic features ([facility] [address] )   (+) Migraine without aura, not intractable   (+) PTSD (post-traumatic stress disorder)          at 40w4d admitted for an elective induction of labor    Lab Results   Component Value Date    WBC 9 38 05/10/2023    HGB 10 7 (L) 05/10/2023    HCT 33 8 (L) 05/10/2023    MCV 84 05/10/2023     05/10/2023     Lab Results   Component Value Date    K 3 9 2015     2015    CO2 24 8 2015    BUN 6 2015    CREATININE 0 76 2015    CALCIUM 9 1 2015     Lab Results   Component Value Date    INR 1 03 2015    PROTIME 12 9 2015     No results found for: HGBA1C       Physical Exam    Airway    Mallampati score: II  TM Distance: >3 FB  Neck ROM: full     Dental   No notable dental hx     Cardiovascular      Pulmonary      Other Findings  gravid      Anesthesia Plan  ASA Score- 2     Anesthesia Type- epidural with ASA Monitors  Additional Monitors:   Airway Plan:           Plan Factors-    Chart reviewed  Existing labs reviewed  Patient summary reviewed  Induction-     Postoperative Plan-     Informed Consent- Anesthetic plan and risks discussed with patient  I personally reviewed this patient with the CRNA  Discussed and agreed on the Anesthesia Plan with the CRNA  Jayne Donaldson

## 2023-05-11 NOTE — OP NOTE
OPERATIVE REPORT  PATIENT NAME: Aviva Willett    :  1995  MRN: [id number]  Pt Location: [facility] OR ROOM 02    SURGERY DATE: 2023    Surgeon(s) and Role:     * Jose Harkins MD - Primary     * Paola Ocampo MD - Assisting    Preop Diagnosis:  39w6d gestational age pregnancy   Polyhydramnios  Anemia   Intellectual disability ( Fetal Alcohol Syndrome)  Major depression disorder  Obesity    Category II fetal heart rate tracing during labor and delivery [O76]  GBS positive status     Post-Op Diagnosis Codes:     * Category II fetal heart rate tracing during labor and delivery [O76]    Procedure(s) (LRB):   SECTION () (N/A)    Specimen(s):  ID Type Source Tests Collected by Time Destination   1 :  Tissue (Placenta on Hold) OB Only Placenta TISSUE EXAM OB (PLACENTA) ONLY Jose Harkins MD 2023 7116    A :  Cord Blood Cord BLOOD GAS, VENOUS, CORD, BLOOD GAS, ARTERIAL, CORD (Canceled) Jose Harkins MD 2023 1434        Surgical QBL:  386cc    Drains:  Urethral Catheter Non-latex 16 Fr  (Active)   Goal for Removal Other (Comment) 05/10/23 2221   Site Assessment Clean;Skin intact 23 0348   Collection Container Standard drainage bag 23 0348   Securement Method Securing device (Describe) 23 0348   Output (mL) 550 mL 23 0644   Number of days: 1       Anesthesia Type: Epidural     Operative Indications:  Category II fetal heart rate tracing during labor and delivery [employer] Classification System:  No Multiple pregnancy, No Transverse or oblique lie, No Breech lie, Gestational age is > or =37 weeks, Nulliparous, Labor induced +  is [employer] [employer] 2a    Operative Findings:    1  Delivery of viable female on 23 at 0826, weight 7 lb 11 oz;  Apgar scores of 8 at one minute and 8 at five minutes  Umbilical artery pH:  insufficient sample (base excess -NA)  2   Normal appearing placenta with centrally-inserted 3 vessel cord  3  Clear amniotic fluid  4  Grossly normal uterus, small 2 cm subserosal fibroid  tubes, and ovaries      Complications:   None    Labor course:   Patient was admitted to labor and delivery for an induction of labor by maternal request at 36w2d gestational age  A win balloon was placed to start the induction   Pitocin and Amniotomy was performed to augment labor  Meconium stained fluid was noted upon AROM  Patient progressed to 6/80/-2 and noted to have intermittent category II tracing  An IUPC and FSE was placed to monitor FHT  Patient continued to have intermittent CAT II  Tracing with recurrent late deceleration and pitocin was discontinued  Pitocin was restarted and patient was noted  progress to 6/90/-2  She continued to have prolonged decels and pitocin was discontinued for the second time  Due to persistent CAT II tracing patient was offered a  section  Patient expressed understanding of the risks associated with the procedure and was consented for a  delivery  Procedure and Technique: In the operating room the correct patient and procedures were identified  Epidural  anesthesia was adequately established  2g Ancef and 500mg of Azithromycin  IV was given for preoperative surgical prophylaxis  Patient was placed in the dorsal supine position with a left tilt of the hips  Fetal heart tones were confirmed to be 140bpm  Chlorhexidene  was used to prep the vagina and perineum    Chloraprep was used to prep the abdomen  She was draped in the usual sterile fashion  Time out was performed with all in agreement  Time out was performed  Pfannenstiel  incision was made in the skin with a surgical scalpel and sharp dissection was carried out over subsequent layers of tissue down to the level of the fascia  The fascia was incised at the midline and the incision was extended bilaterally using the curved Hannon scissors   The superior edge of the fascial incision was grasped with Kocher clamps and bluntly dissected off the underlying rectus muscles   The inferior edge of the fascial incision was grasped with Kocher clamps and the underlying rectus muscles  dissected off bluntly  The rectus muscles were then divided at midline and the peritoneum was identified and entered  The peritoneal incision was extended superiorly and inferiorly  The bladder blade was inserted and the vesicouterine peritoneum was identified  A bladder blade was reinserted and a transverse incision was made in the lower uterine segment using a new surgical blade  Clear amniotic fluid was noted  The uterine incision was extended cephalad and caudal using blunt dissection  The surgeon's hand was placed into the uterine cavity  The fetal head was identified and elevated through the uterine incision with the assistance of fundal pressure  There was no nuchal cord noted  With gentle traction, the shoulders were delivered followed by the rest of the fetal body  Following delayed cord clamping, the umbilical cord was doubly clamped and cut  The infant was then passed off the table to the awaiting  staff  The  was noted to cry spontaneously and moved all extremities  Venous and arterial blood gas, cord blood, and portion of cord was obtained for analysis and routine blood testing  The placenta delivered with uterine massage and was noted to be intact with and had a central insertion of a three-vessel cord  The placenta was sent to pathology   Oxytocin was administered by IV infusion to enhance uterine contraction  The uterus was exteriorized and cleared of all clots and remaining products of conception  The hysterotomy was reapproximated using 0 vicryl  in a running locked fashion  A second imbricating stitch with vicryl  was applied  Hemostasis was achieved  The posterior cul-de-sac was cleared of all clots and products of conception   The uterus was replaced into the abdomen and the pericolic gutters were cleared of all clots  Hemostasis was once again confirmed at the hysterotomy  The fascia was reapproximated using 0 vicryl in a running nonlocked fashion  The subcutaneous tissue was irrigated and cleared of all clots and debris  Good hemostasis was noted with Bovie electrocautery  The subcutaneous tissue was reapproximated with 2-0 plain gut in a running fashion  The skin incision was closed in a running subcuticular fashion with 3-0 monocryl  Good hemostasis was noted  steristrips was applied  The uterus was expressed of clots  Patient tolerated the procedure well  All needle, sponge, and instrument counts were noted to be correct x 2 at the end of the procedure  Patient was transferred to the recovery room in stable condition  Dr Sabra Matamoros was present for the procedure        Patient Disposition:  PACU         SIGNATURE: Seth Dhillon MD  DATE: May 11, 2023  TIME: 10:14 AM

## 2023-05-11 NOTE — OB LABOR/OXYTOCIN SAFETY PROGRESS
Oxytocin Safety Progress Check Note - Waqar Benavidez 32 y o  female MRN: [id number]    Unit/Bed#: -01 Encounter: [id number]    Dose (michelle-units/min) Oxytocin: 0 michelle-units/min  Contraction Frequency (minutes): 2-4  Contraction Quality: Moderate  Tachysystole: No   Cervical Dilation: 5        Cervical Effacement: 70  Fetal Station: -3  Baseline Rate: 120 bpm  Fetal Heart Rate: 122 BPM  FHR Category: Category I               Vital Signs: *  Vitals:    23 0330   BP: 111/61   Pulse: 80   Resp:    Temp:    SpO2:        Notes/comments: Pt and fetal stability assessed prior to going back to OR with another pt on the floor who requires   On exam her cervix is actually 5/70/-3  Tracing improved significantly since pitocin turned off and amnioinfusion started  Had great scalp stim and acceleration at 0340 which is very reassuring  Will plan to hold pitocin until out of the OR  Pt aware that  is recommended w/ persistent cat 2 tracing     Kevin Kimble MD 2023 3:53 AM

## 2023-05-11 NOTE — OB LABOR/OXYTOCIN SAFETY PROGRESS
Oxytocin Safety Progress Check Note - Valdene Goltz 32 y o  female MRN: [id number]    Unit/Bed#: -01 Encounter: [id number]    Dose (michelle-units/min) Oxytocin: 2 michelle-units/min  Contraction Frequency (minutes): 2-4  Contraction Quality: Moderate  Tachysystole: No   Cervical Dilation: 5        Cervical Effacement: 70  Fetal Station: -3  Baseline Rate: 115 bpm  Fetal Heart Rate: 119 BPM  FHR Category: Category I               Vital Signs:   Vitals:    05/11/23 0515   BP: 116/65   Pulse: 76   Resp:    Temp:    SpO2:        Notes/comments:   Intermittent Cat 2 tracing  When Cat 1 tracing resumed pitocin was restarted at 0520  If persistent Cat 2 recommend 1lpetr Britton MD 5/11/2023 5:31 AM

## 2023-05-11 NOTE — DISCHARGE INSTRUCTIONS
WHAT YOU NEED TO KNOW:   A , or  section, is abdominal surgery to deliver your baby  DISCHARGE INSTRUCTIONS:   Call 911 for any of the following: You feel lightheaded, short of breath, and have chest pain  You cough up blood  Seek care immediately if:   Blood soaks through your bandage  Your stitches come apart  Your arm or leg feels warm, tender, and painful  It may look swollen and red  Contact your OB if:   You have heavy vaginal bleeding that fills 1 or more sanitary pads in 1 hour  You have a fever  Your incision is swollen, red, or draining pus  You have questions or concerns about yourself or your baby  Medicines: You may  need any of the following:  Prescription pain medicine  may be given  Ask how to take this medicine safely  Acetaminophen  decreases pain and fever  It is available without a doctor's order  Ask how much to take and how often to take it  Follow directions  Acetaminophen can cause liver damage if not taken correctly  NSAIDs , such as ibuprofen, help decrease swelling, pain, and fever  NSAIDs can cause stomach bleeding or kidney problems in certain people  If you take blood thinner medicine, always ask your healthcare provider if NSAIDs are safe for you  Always read the medicine label and follow directions  Take your medicine as directed  Contact your healthcare provider if you think your medicine is not helping or if you have side effects  Tell him or her if you are allergic to any medicine  Keep a list of the medicines, vitamins, and herbs you take  Include the amounts, and when and why you take them  Bring the list or the pill bottles to follow-up visits  Carry your medicine list with you in case of an emergency  Wound care:  Carefully wash your wound with soap and water every day  Keep your wound clean and dry  Wear loose, comfortable clothes that do not rub against your wound   Ask your OB about bathing and showering  Limit activity as directed: Ask when it is safe for you to drive, walk up stairs, lift heavy objects, and have sex  Ask when it is okay to exercise, and what types of exercise to do  Start slowly and do more as you get stronger  Drink liquids as directed:  Liquids help keep you hydrated after your procedure and decrease your risk for a blood clot  Ask how much liquid to drink each day and which liquids are best for you  Follow up with your OB as directed: You may need to return to have your stitches or staples removed  Write down your questions so you remember to ask them during your visits  © 2017 [employer]  Information is for End User's use only and may not be sold, redistributed or otherwise used for commercial purposes  All illustrations and images included in CareNotes® are the copyrighted property of A D A M , Inc  or Wilmer Marley  The above information is an  only  It is not intended as medical advice for individual conditions or treatments  Talk to your doctor, nurse or pharmacist before following any medical regimen to see if it is safe and effective for you  Postpartum Perineal Care   WHAT YOU NEED TO KNOW:   Postpartum perineal care is care for your perineum after you have a baby  The perineum is your vagina and anus  DISCHARGE INSTRUCTIONS:   Care for your perineum:  Healthcare providers will give you a small squirt bottle and show you how to use it  Do the following after you use the toilet and before you put on a new pad:  Remove the soiled pad    Use the squirt bottle to rinse your perineum from front to back while you sit on the toilet     Pat the area dry from front to back with toilet paper or a cotton cloth     Put on a fresh pad    Wash your hands  Decrease pain:  Ask your healthcare provider about these and other ways to decrease perineal pain:  Sitz baths:  Healthcare providers may give you a portable sitz bath   This is a small tub that fits in the toilet  Fill the sitz bath or bathtub with 4 to 6 inches of warm water  Sit in the warm water for 20 minutes 2 to 3 times a day  Ice:  Ice helps decrease swelling and pain  Ice may also help prevent tissue damage  Use an ice pack, or put crushed ice in a plastic bag  Cover it with a towel and place it on your perineum for 15 to 20 minutes every hour, or as directed  Medicine spray, wipes, or pads:  Healthcare providers may give you a medicine spray or wipes soaked with numbing medicine to decrease the pain  Pads that contain an herb called witch hazel may also help reduce pain  Use these after perineal care or a sitz bath  Follow up with your healthcare provider as directed:  Write down your questions so you remember to ask them during your visits  Contact your healthcare provider if:   You have heavy vaginal bleeding that fills 1 or more sanitary pads in 1 hour  You have foul-smelling vaginal discharge  You feel weak or lightheaded  You have questions or concerns about your condition or care  Seek care immediately or call 911 if:   You have large blood clots or bright red blood coming from your vagina  You have abdominal pain, vomiting, and a fever  © 2017 [address] [employer]  Information is for End User's use only and may not be sold, redistributed or otherwise used for commercial purposes  All illustrations and images included in CareNotes® are the copyrighted property of [employer]  or Wilmer Marley  The above information is an  only  It is not intended as medical advice for individual conditions or treatments  Talk to your doctor, nurse or pharmacist before following any medical regimen to see if it is safe and effective for you  Postpartum Depression   WHAT YOU NEED TO KNOW:   What is postpartum depression? Postpartum depression is a mood disorder that occurs after giving birth  A mood is an emotion or a feeling   Moods affect your behavior and how you feel about yourself and life in general  Depression is a sad mood that you cannot control  Women often feel sad, afraid, or nervous after their baby is born  These feelings are called postpartum blues or baby blues, and they usually go away in 1 to 2 weeks  With postpartum depression, these symptoms get worse and continue for more than 2 weeks  Postpartum depression is a serious condition that affects your daily activities and relationships  What causes postpartum depression? Healthcare providers do not know exactly what causes postpartum depression  It may be caused by a sudden drop in hormone levels after childbirth  A previous episode of postpartum depression or a family history of depression may increase your risk  Several things may trigger postpartum depression:  Lack of support from the baby's father or other family members    Feeling more tired than usual    Stress, a poor diet, or lack of sleep    Pain after childbirth or pain during breastfeeding    Sudden change in lifestyle  How is postpartum depression diagnosed? Postpartum depression affects your daily activities and your relationships with other people  Healthcare providers will ask you questions about your signs and symptoms and how they are affecting your life  The symptoms of postpartum depression usually begin within 1 month after childbirth  You feel depressed or lose interest in activities you enjoy nearly every day for at least 2 weeks  You also have 4 or more of the following symptoms: You feel tired or have less energy than usual      You feel unimportant or guilty most of the time  You think about hurting or killing yourself  Your appetite changes  You may lose your appetite and lose weight without trying  Your appetite may also increase and you may gain weight  You are restless, irritable, or withdrawn  You have trouble concentrating and remembering things   You have trouble doing daily tasks or making decisions  You have trouble sleeping, even after the baby is asleep  How is postpartum depression treated? Psychotherapy:  During therapy, you will talk with healthcare providers about how to cope with your feelings and moods  This can be done alone or in a group  It may also be done with family members or your partner  Antidepressants: This medicine is given to decrease or stop the symptoms of depression  You usually need to take antidepressants for several weeks before you begin to feel better  Do not stop taking antidepressants unless your healthcare provider tells you to  Healthcare providers may try a different antidepressant if one type does not work  What can I do to feel better? Rest:  Do not try to do everything all at the same time  Do only what is needed and let other things wait until later  Ask your family or friends for help, especially if you have other children  Ask your partner to help with night feedings or other baby care  Try to sleep when the baby naps  Get emotional support:  Share your feelings with your partner, a friend, or another mother  Take care of yourself:  Shower and dress each day  Do not skip meals  Try to get out of the house a little each day  Get regular exercise  Eat a healthy diet  Avoid alcohol because it can make your depression worse  Do not isolate yourself  Go for a walk or meet with a friend  It is also important that you have some time by yourself each day  How do I find support and more information? [address] [place] [address], Public Information & Communication Branch  [address], [address], [address]  [place] [address]   Phone: [phone]  Phone: [phone]  Web Address: [facility]  When should I contact my healthcare provider? You cannot make it to your next visit  Your depression does not get better with treatment or it gets worse       You have questions or concerns about your condition or care  When should I seek immediate care or call 911? You think about hurting or killing yourself, your baby, or someone else  You feel like other people want to hurt you  You hear voices telling you to hurt yourself or your baby  CARE AGREEMENT:   You have the right to help plan your care  Learn about your health condition and how it may be treated  Discuss treatment options with your caregivers to decide what care you want to receive  You always have the right to refuse treatment  The above information is an  only  It is not intended as medical advice for individual conditions or treatments  Talk to your doctor, nurse or pharmacist before following any medical regimen to see if it is safe and effective for you  ©  2600 [facility] Information is for End User's use only and may not be sold, redistributed or otherwise used for commercial purposes  All illustrations and images included in CareNotes® are the copyrighted property of A D A M , Inc  or Wilmer Marley  Postpartum Bleeding   WHAT YOU NEED TO KNOW:   Postpartum bleeding is vaginal bleeding after childbirth  This bleeding is normal, whether your baby was born vaginally or by   It contains blood and the tissue that lined the inside of your uterus when you were pregnant  DISCHARGE INSTRUCTIONS:   What to expect with postpartum bleeding:  Postpartum bleeding usually lasts at least 10 days, and may last longer than 6 weeks  Your bleeding may range from light (barely staining a pad) to heavy (soaking a pad in 1 hour)  Usually, you have heavier bleeding right after childbirth, which slows over the next few weeks until it stops  The bleeding is red or dark brown with clots for the first 1 to 3 days  It then turns pink for several days, and then becomes a white or yellow discharge until it ends    Follow up with your obstetrician as directed:  Do not have sex until your obstetrician says it is okay  Write down your questions so you remember to ask them during your visits  Contact your healthcare provider or obstetrician if:   Your bleeding increases, or you have heavy bleeding that soaks a pad in 1 hour for 2 hours in a row  You pass large blood clots  You are breathing faster than normal, or your heart is beating faster than normal     You are urinating less than usual, or not at all  You feel dizzy  You have questions or concerns about your condition or care  Seek immediate care or call 911 if:   You are suddenly short of breath and feel lightheaded  You have sudden chest pain  © 2017 [address] [employer]  Information is for End User's use only and may not be sold, redistributed or otherwise used for commercial purposes  All illustrations and images included in CareNotes® are the copyrighted property of A D A M , Inc  or Wilmer Marley  The above information is an  only  It is not intended as medical advice for individual conditions or treatments  Talk to your doctor, nurse or pharmacist before following any medical regimen to see if it is safe and effective for you  Breast Care for the Breast Feeding Mother   WHAT YOU SHOULD KNOW:   Your breasts will go through normal changes while you are breastfeeding  Sometimes breast and nipple problems can develop while you are breastfeeding  Learn about changes that are normal and those that may be a problem  Breast care can help you prevent and manage problems so you and your baby can enjoy the benefits of breastfeeding  AFTER YOU LEAVE:   Breast changes while you are breastfeeding: For the first few days after your baby is born, your body makes a small amount of breast milk (colostrum)  Within about 2 to 5 days, your body will begin making mature milk  It may take up to 10 days or longer for mature milk to come in   When your mature milk comes in, your breasts will become full and firm  They may feel tender  Breastfeeding your baby will decrease the full feeling in your breasts  You may feel a tingly sensation during feedings as milk is released from your breasts  This is called the milk let-down reflex  After 7 or more days, the fullness may feel like it has decreased  Your nipples should look the same as they did before you started breastfeeding  Breasts that feel full before and empty after breastfeeding are signs that breastfeeding is going well  Breast problems that can occur while you are breastfeeding:   Nipple soreness  may occur when you begin to breastfeed your baby  You may also have nipple soreness if your baby is not latched on to your breast correctly  Correct positioning and latch-on may decrease or stop the pain in your nipples  Work with your caregivers to help your baby latch on correctly  It may also be helpful to place warm, wet compresses on your nipples to help decrease pain  Plugged milk ducts  may cause painful breast lumps  Plugged ducts may be caused by not emptying your breasts completely during feedings  When your baby pauses during breastfeeding, massage and gently squeeze your breast  Gentle massage may unplug a blocked milk duct  Pump out any milk left in your breasts after your baby is done breastfeeding  Avoid wearing tight tops, tight bras, or under-wire bras, because they may put pressure on your breasts  Engorgement  may occur as your milk comes in soon after you begin breastfeeding  Engorgement may cause your breasts to become swollen and painful  Your breasts may also become engorged if you miss a feeding or you do not breastfeed on demand  The best way to decrease engorgement symptoms is to empty your breasts by feeding your baby often  Engorgement can make it hard for your baby to latch on to your breast  If this happens, express a small amount of milk and then have your baby latch on   Cold compresses, gel packs, or ice packs on your breasts can help decrease pain and swelling  Ask your caregiver how often and how long you should use cold, or ice packs  A breast infection called mastitis  can develop if you have plugged milk ducts or engorgement  Mastitis causes your breasts to become red, swollen, and painful  You may also have flu-like symptoms, such as chills and a fever  Place heat on your breasts to help decrease the pain  You may want to place a moist, warm cloth on the painful breast or both of your breasts  Ask how often to do this  Your primary healthcare provider [facility]) may suggest that you take an NSAID, such as ibuprofen, to decrease pain and swelling  He may also order antibiotics to treat mastitis  Ask about feeding your baby when you have a breast infection  How to help prevent or manage breast problems while you are breastfeeding:   Learn how to position your baby and latch him on correctly  To latch your baby correctly to your breast, make sure that his mouth covers most of your areola (dark area around your nipple)  He should not be attached only to the nipple  Your baby is latched on well if you feel comfortable and do not feel pain  A correct latch helps him get enough milk and can help to prevent sore nipples and other breast problems  There are several breastfeeding positions that you can try  Find the position that works best for you and your baby  Ask your caregiver for more information about how to hold and breastfeed your baby  Prevent biting  Your baby may get teeth at about 1to 3months of age  To help prevent biting, break his suction once he is finished or if he has fallen asleep  To break his suction, slip a finger into the side of his mouth  If your baby bites you, respond with surprise or unhappiness  Offer praise when he does not bite you  Breastfeed your baby regularly  Feed your baby 8 to 12 times a day  You may need to wake up your baby at night to feed him   It is okay to feed from 1 or both breasts at each feeding  Your baby should breastfeed from both breasts equally over the course of a day  If your baby only feeds from 1 side during a feeding, offer your other breast to him first for the next feeding  Schedule and keep follow-up visits  Talk to your baby's pediatrician or your PHP during follow-up visits if you have breast problems  Caregivers may suggest that you, or you and your partner, attend classes on breastfeeding  You also may want to join a breastfeeding support group  Caregivers may suggest that you see a lactation consultant  This is a caregiver who can help you with breastfeeding  Contact your PHP if:   You have a fever and chills  You have body aches and you feel like you do not have any energy  One or both of your breasts is red, swollen or hard, painful, and feels warm or hot  You have breast engorgement that does not get better within 24 hours  You see or feel a lump in your breast that hurts when you touch it  You have nipple pain during breastfeeding or between feedings  Your nipples are red, dry, cracked, or bleeding, or they have scabs on them  You have questions or concerns about your condition or care  © 2014 [address] is for End User's use only and may not be sold, redistributed or otherwise used for commercial purposes  All illustrations and images included in CareNotes® are the copyrighted property of A D A M , Inc  or Wilmer Marley  The above information is an  only  It is not intended as medical advice for individual conditions or treatments  Talk to your doctor, nurse or pharmacist before following any medical regimen to see if it is safe and effective for you

## 2023-05-11 NOTE — OB LABOR/OXYTOCIN SAFETY PROGRESS
Oxytocin Safety Progress Check Note - Renzo Stock 32 y o  female MRN: [id number]    Unit/Bed#: -01 Encounter: [id number]    Dose (michelle-units/min) Oxytocin: 0 michelle-units/min (Per Dr Chong Jeffrey)  Contraction Frequency (minutes): 3  Contraction Quality: Moderate  Tachysystole: No   Cervical Dilation: 5        Cervical Effacement: 70  Fetal Station: -3  Baseline Rate: 119 bpm  Fetal Heart Rate: 120 BPM  FHR Category: Category I               Vital Signs:   Vitals:    23 0700   BP: 122/66   Pulse: 71   Resp:    Temp:    SpO2:        Notes/comments:   Some progression to 6-7/90/-2  Good accel with check, however afterward had prolonged decel  Pit turned down and then off  This is second time pit has been off  Recommend delivery via  at this time  Pt verbally consented - discussed risk of bleeding, infection, injury to other structures  She expressed understanding  Pit off and tracing reassuring but plan to go as soon as possible  Teams aware          Colleen Wynn MD 2023 7:53 AM

## 2023-05-11 NOTE — OB LABOR/OXYTOCIN SAFETY PROGRESS
Labor Progress Note - Luz Maynard 32 y o  female MRN: [id number]    Unit/Bed#: -01 Encounter: [id number]       Contraction Frequency (minutes): 2  Contraction Quality: Mild  Tachysystole: No   Cervical Dilation: 4-5        Cervical Effacement: 70  Fetal Station: -2  Baseline Rate: 120 bpm  Fetal Heart Rate: 134 BPM  FHR Category: Category I               Vital Signs:   Vitals:    05/10/23 2203   BP: 122/68   Pulse: 69   Resp:    Temp:    SpO2:        Notes/comments: Patient to receive epidural  SVE deferred  Patient kaity every 2min  FHT reactive          Efrain England MD 5/10/2023 10:16 PM

## 2023-05-11 NOTE — DISCHARGE SUMMARY
Obstetrics Discharge Summary   Debbie Brooks 32 y o  female MRN: [id number]  Unit/Bed#: LD PACU- Encounter: [id number]    Admission Date: 5/10/2023     Discharge Date: 23    Admitting Diagnoses:   40w5d gestational age pregnancy   Polyhydramnios  Anemia   Intellectual disability ( Fetal Alcohol Syndrome)  Major depression disorder  Obesity    Category II fetal heart rate tracing during labor and delivery [O76]  GBS positive status       Discharge Diagnoses:   Same, delivered    Procedures:   primary  section, low transverse incision    Admitting Attending: Dr Nolvia Ventura  Delivery Attending: Dr Nolvia Ventura  Discharge Attending: Dr Belen Wynne Course:   Debbie Brooks is now a 32 y o  Ignacio Retort who was initially admitted at 40w4d for induction of labor by maternal request A win balloon was placed to start the induction   Pitocin and Amniotomy was performed to augment labor  Meconium stained fluid was noted upon AROM  Patient progressed to 6/80/-2 and noted to have intermittent category II tracing  An IUPC and FSE was placed to monitor FHT  Patient continued to have intermittent CAT II  Tracing with recurrent late deceleration and pitocin was discontinued  Pitocin was restarted and patient was noted  progress to 6/90/-2  She continued to have prolonged decels and pitocin was discontinued for the second time  Due to persistent CAT II tracing patient was offered a  section  Patient expressed understanding of the risks associated with the procedure and was consented for a  delivery    She underwent an uncomplicated  low transverse  section delivery on 23 and delivered a viable female  at 46  APGARS were 8, 8 at 1 and 5 minutes, respectively  's birth weight was 7 lb 11 oz  Placenta delivered without difficulty   was admitted to the  nursery   Patient tolerated the procedure well and was transferred to recovery in stable condition  The patient's post partum course was unremarkable    Her preoperative hemoglobin was 10 7 g/dL, postoperative was 7 8, she received a dose of Venofer, and Hgb stable at 8 4  Her postoperative pain was well controlled with oral analgesics  On day of discharge, she was ambulating and able to reasonably perform all ADLs  She was voiding and had appropriate bowel function  Pain was well controlled  She was discharged home on post-operative day #2 without complications  Patient was instructed to follow up with her OB as an outpatient and was given appropriate warnings to call provider if she develops signs of infection or uncontrolled pain  She is breast feeding and bottle feeding   Mom's blood type is A positive  RhoGAM was not indicated      Complications:   None    Condition at discharge:   good     Provisions for Follow-Up Care:  See after visit summary for information related to follow-up care and any pertinent home health orders  Disposition:   Home    Planned Readmission:   No    Discharge Medications:   Please see AVS for a complete list of discharge medications  Discharge instructions :   Please see AVS for complete discharge instructions

## 2023-05-11 NOTE — PLAN OF CARE
Problem: POSTPARTUM  Goal: Experiences normal postpartum course  Description: INTERVENTIONS:  - Monitor maternal vital signs  - Assess uterine involution and lochia  Outcome: Progressing  Goal: Appropriate maternal -  bonding  Description: INTERVENTIONS:  - Identify family support  - Assess for appropriate maternal/infant bonding   -Encourage maternal/infant bonding opportunities  - Referral to  or  as needed  Outcome: Progressing  Goal: Establishment of infant feeding pattern  Description: INTERVENTIONS:  - Assess breast/bottle feeding  - Refer to lactation as needed  Outcome: Progressing  Goal: Incision(s), wounds(s) or drain site(s) healing without S/S of infection  Description: INTERVENTIONS  - Assess and document dressing, incision, wound bed, drain sites and surrounding tissue  - Provide patient and family education  - Perform skin care/dressing changes every   Outcome: Progressing     Problem: PAIN - ADULT  Goal: Verbalizes/displays adequate comfort level or baseline comfort level  Description: Interventions:  - Encourage patient to monitor pain and request assistance  - Assess pain using appropriate pain scale  - Administer analgesics based on type and severity of pain and evaluate response  - Implement non-pharmacological measures as appropriate and evaluate response  - Consider cultural and social influences on pain and pain management  - Notify physician/advanced practitioner if interventions unsuccessful or patient reports new pain  Outcome: Progressing     Problem: INFECTION - ADULT  Goal: Absence or prevention of progression during hospitalization  Description: INTERVENTIONS:  - Assess and monitor for signs and symptoms of infection  - Monitor lab/diagnostic results  - Monitor all insertion sites, i e  indwelling lines, tubes, and drains  - Monitor endotracheal if appropriate and nasal secretions for changes in amount and color  - [place] appropriate cooling/warming therapies per order  - Administer medications as ordered  - Instruct and encourage patient and family to use good hand hygiene technique  - Identify and instruct in appropriate isolation precautions for identified infection/condition  Outcome: Progressing  Goal: Absence of fever/infection during neutropenic period  Description: INTERVENTIONS:  - Monitor WBC    Outcome: Progressing     Problem: SAFETY ADULT  Goal: Patient will remain free of falls  Description: INTERVENTIONS:  - Educate patient/family on patient safety including physical limitations  - Instruct patient to call for assistance with activity   - Consult OT/PT to assist with strengthening/mobility   - Keep Call bell within reach  - Keep bed low and locked with side rails adjusted as appropriate  - Keep care items and personal belongings within reach  - Initiate and maintain comfort rounds  - Make Fall Risk Sign visible to staff  - Offer Toileting every  Hours, in advance of need  - Initiate/Maintain alarm  - Obtain necessary fall risk management equipment:  - Apply yellow socks and bracelet for high fall risk patients  - Consider moving patient to room near nurses station  Outcome: Progressing  Goal: Maintain or return to baseline ADL function  Description: INTERVENTIONS:  -  Assess patient's ability to carry out ADLs; assess patient's baseline for ADL function and identify physical deficits which impact ability to perform ADLs (bathing, care of mouth/teeth, toileting, grooming, dressing, etc )  - Assess/evaluate cause of self-care deficits   - Assess range of motion  - Assess patient's mobility; develop plan if impaired  - Assess patient's need for assistive devices and provide as appropriate  - Encourage maximum independence but intervene and supervise when necessary  - Involve family in performance of ADLs  - Assess for home care needs following discharge   - Consider OT consult to assist with ADL evaluation and planning for discharge  - Provide patient education as appropriate  Outcome: Progressing  Goal: Maintains/Returns to pre admission functional level  Description: INTERVENTIONS:  - Perform BMAT or MOVE assessment daily    - Set and communicate daily mobility goal to care team and patient/family/caregiver  - Collaborate with rehabilitation services on mobility goals if consulted  - Perform Range of Motion  times a day  - Reposition patient every  hours    - Dangle patient  times a day  - Stand patient  times a day  - Ambulate patient  times a day  - Out of bed to chair  times a day   - Out of bed for meals times a day  - Out of bed for toileting  - Record patient progress and toleration of activity level   Outcome: Progressing     Problem: DISCHARGE PLANNING  Goal: Discharge to home or other facility with appropriate resources  Description: INTERVENTIONS:  - Identify barriers to discharge w/patient and caregiver  - Arrange for needed discharge resources and transportation as appropriate  - Identify discharge learning needs (meds, wound care, etc )  - Arrange for interpretive services to assist at discharge as needed  - Refer to Case Management Department for coordinating discharge planning if the patient needs post-hospital services based on physician/advanced practitioner order or complex needs related to functional status, cognitive ability, or social support system  Outcome: Progressing

## 2023-05-11 NOTE — ANESTHESIA PROCEDURE NOTES
CSE Block    Patient location during procedure: OB  Start time: 5/10/2023 8:55 PM  Reason for block: procedure for pain and at surgeon's request  Staffing  Performed: Anesthesiologist   Anesthesiologist: Ce Shepherd MD  Preanesthetic Checklist  Completed: patient identified, IV checked, site marked, risks and benefits discussed, surgical consent, monitors and equipment checked, pre-op evaluation and timeout performed  CSE  Patient position: sitting  Prep: ChloraPrep  Patient monitoring: cardiac monitor and continuous pulse ox  Approach: midline  Spinal Needle  Needle type: pencil-tip   Needle gauge: 27 G  Needle length: 10 cm  Epidural Needle  Injection technique: MILADY saline  Needle type: Tuohy   Needle gauge: 18 G  Needle insertion depth: 8 cm  Location: L3-L4  Catheter  Catheter type: side hole  Catheter size: 20 G  Catheter at skin depth: 14 cm  Test dose: negative  Assessment  Injection Assessment:  negative aspiration for heme, no paresthesia on injection, positive aspiration for clear CSF and no pain on injection  Additional Notes  2 mL of 0 2% ropivacaine given intrathecally with CSE

## 2023-05-11 NOTE — ANESTHESIA POSTPROCEDURE EVALUATION
Post-Op Assessment Note    CV Status:  Stable  Pain Score: 0    Pain management: adequate     Mental Status:  Arousable   Hydration Status:  Stable   PONV Controlled:  None   Airway Patency:  Patent      Post Op Vitals Reviewed: Yes      Staff: Anesthesiologist, CRNA         No notable events documented      /60 (05/11/23 0936)    Temp 98 1 °F (36 7 °C) (05/11/23 0936)    Pulse 84 (05/11/23 0936)   Resp 18 (05/11/23 0936)    SpO2 98 % (05/11/23 0936)

## 2023-05-11 NOTE — OB LABOR/OXYTOCIN SAFETY PROGRESS
Labor Progress Note - Drew Chen 32 y o  female MRN: [id number]    Unit/Bed#: -01 Encounter: [id number]       Contraction Frequency (minutes): 1 5-2 5  Contraction Quality: Mild  Tachysystole: No   Cervical Dilation: 6        Cervical Effacement: 80  Fetal Station: -2  Baseline Rate: 135 bpm  Fetal Heart Rate: 133 BPM  FHR Category: Category I               Vital Signs:   Vitals:    05/10/23 2248   BP: 133/79   Pulse: 78   Resp:    Temp:    SpO2:        Notes/comments: External monitoring discontinuous, however it appears baseline 125bpm, moderate variability  SVE as above  Amniotomy performed for thin-meconium  FSE and IUPC placed at this time  Continue to monitor       D/w dr Melissa Alberto MD 5/10/2023 11:33 PM

## 2023-05-12 LAB
DME PARACHUTE DELIVERY DATE ACTUAL: NORMAL
DME PARACHUTE DELIVERY DATE REQUESTED: NORMAL
DME PARACHUTE ITEM DESCRIPTION: NORMAL
DME PARACHUTE ORDER STATUS: NORMAL
DME PARACHUTE SUPPLIER NAME: NORMAL
DME PARACHUTE SUPPLIER PHONE: NORMAL
ERYTHROCYTE [DISTWIDTH] IN BLOOD BY AUTOMATED COUNT: 15 % (ref 11.6–15.1)
ERYTHROCYTE [DISTWIDTH] IN BLOOD BY AUTOMATED COUNT: 15.4 % (ref 11.6–15.1)
HCT VFR BLD AUTO: 24.7 % (ref 34.8–46.1)
HCT VFR BLD AUTO: 26.6 % (ref 34.8–46.1)
HGB BLD-MCNC: 7.8 G/DL (ref 11.5–15.4)
HGB BLD-MCNC: 8.4 G/DL (ref 11.5–15.4)
MCH RBC QN AUTO: 26.8 PG (ref 26.8–34.3)
MCH RBC QN AUTO: 27.6 PG (ref 26.8–34.3)
MCHC RBC AUTO-ENTMCNC: 30.8 G/DL (ref 31.4–37.4)
MCHC RBC AUTO-ENTMCNC: 31.6 G/DL (ref 31.4–37.4)
MCV RBC AUTO: 87 FL (ref 82–98)
MCV RBC AUTO: 88 FL (ref 82–98)
PLATELET # BLD AUTO: 153 THOUSANDS/UL (ref 149–390)
PLATELET # BLD AUTO: 170 THOUSANDS/UL (ref 149–390)
PMV BLD AUTO: 11.9 FL (ref 8.9–12.7)
PMV BLD AUTO: 12.4 FL (ref 8.9–12.7)
RBC # BLD AUTO: 2.84 MILLION/UL (ref 3.81–5.12)
RBC # BLD AUTO: 3.04 MILLION/UL (ref 3.81–5.12)
WBC # BLD AUTO: 12.85 THOUSAND/UL (ref 4.31–10.16)
WBC # BLD AUTO: 15.16 THOUSAND/UL (ref 4.31–10.16)

## 2023-05-12 RX ORDER — IBUPROFEN 600 MG/1
600 TABLET ORAL EVERY 6 HOURS SCHEDULED
Status: DISCONTINUED | OUTPATIENT
Start: 2023-05-12 | End: 2023-05-14 | Stop reason: HOSPADM

## 2023-05-12 RX ADMIN — IRON SUCROSE 200 MG: 20 INJECTION, SOLUTION INTRAVENOUS at 08:12

## 2023-05-12 RX ADMIN — FERROUS SULFATE TAB 325 MG (65 MG ELEMENTAL FE) 325 MG: 325 (65 FE) TAB at 08:13

## 2023-05-12 RX ADMIN — ACETAMINOPHEN 650 MG: 325 TABLET ORAL at 02:27

## 2023-05-12 RX ADMIN — HYDROMORPHONE HYDROCHLORIDE 0.5 MG: 1 INJECTION, SOLUTION INTRAMUSCULAR; INTRAVENOUS; SUBCUTANEOUS at 02:30

## 2023-05-12 RX ADMIN — IBUPROFEN 600 MG: 600 TABLET, FILM COATED ORAL at 23:36

## 2023-05-12 RX ADMIN — DOCUSATE SODIUM 100 MG: 100 CAPSULE, LIQUID FILLED ORAL at 08:13

## 2023-05-12 RX ADMIN — ENOXAPARIN SODIUM 40 MG: 40 INJECTION SUBCUTANEOUS at 21:10

## 2023-05-12 RX ADMIN — SERTRALINE HYDROCHLORIDE 100 MG: 100 TABLET ORAL at 08:13

## 2023-05-12 RX ADMIN — ACETAMINOPHEN 650 MG: 325 TABLET ORAL at 14:51

## 2023-05-12 RX ADMIN — ENOXAPARIN SODIUM 40 MG: 40 INJECTION SUBCUTANEOUS at 08:13

## 2023-05-12 RX ADMIN — IBUPROFEN 600 MG: 600 TABLET, FILM COATED ORAL at 17:37

## 2023-05-12 RX ADMIN — Medication 1 TABLET: at 08:13

## 2023-05-12 RX ADMIN — FAMOTIDINE 20 MG: 20 TABLET, FILM COATED ORAL at 08:13

## 2023-05-12 RX ADMIN — IBUPROFEN 600 MG: 600 TABLET, FILM COATED ORAL at 11:57

## 2023-05-12 RX ADMIN — FAMOTIDINE 20 MG: 20 TABLET, FILM COATED ORAL at 17:37

## 2023-05-12 RX ADMIN — DOCUSATE SODIUM 100 MG: 100 CAPSULE, LIQUID FILLED ORAL at 17:36

## 2023-05-12 NOTE — PLAN OF CARE
Problem: POSTPARTUM  Goal: Experiences normal postpartum course  Description: INTERVENTIONS:  - Monitor maternal vital signs  - Assess uterine involution and lochia  Outcome: Progressing  Goal: Appropriate maternal -  bonding  Description: INTERVENTIONS:  - Identify family support  - Assess for appropriate maternal/infant bonding   -Encourage maternal/infant bonding opportunities  - Referral to  or  as needed  Outcome: Progressing  Goal: Establishment of infant feeding pattern  Description: INTERVENTIONS:  - Assess breast/bottle feeding  - Refer to lactation as needed  Outcome: Progressing  Goal: Incision(s), wounds(s) or drain site(s) healing without S/S of infection  Description: INTERVENTIONS  - Assess and document dressing, incision, wound bed, drain sites and surrounding tissue  - Provide patient and family education  -Outcome: Progressing     Problem: PAIN - ADULT  Goal: Verbalizes/displays adequate comfort level or baseline comfort level  Description: Interventions:  - Encourage patient to monitor pain and request assistance  - Assess pain using appropriate pain scale  - Administer analgesics based on type and severity of pain and evaluate response  - Implement non-pharmacological measures as appropriate and evaluate response  - Consider cultural and social influences on pain and pain management  - Notify physician/advanced practitioner if interventions unsuccessful or patient reports new pain  Outcome: Progressing     Problem: INFECTION - ADULT  Goal: Absence or prevention of progression during hospitalization  Description: INTERVENTIONS:  - Assess and monitor for signs and symptoms of infection  - Monitor lab/diagnostic results  - Monitor all insertion sites, i e  indwelling lines, tubes, and drains  - Monitor endotracheal if appropriate and nasal secretions for changes in amount and color  - Circle appropriate cooling/warming therapies per order  - Administer medications as ordered  - Instruct and encourage patient and family to use good hand hygiene technique  - Identify and instruct in appropriate isolation precautions for identified infection/condition  Outcome: Progressing  Goal: Absence of fever/infection during neutropenic period  Description: INTERVENTIONS:  - Monitor WBC    Outcome: Progressing     Problem: SAFETY ADULT  Goal: Patient will remain free of falls  Description: INTERVENTIONS:  - Educate patient/family on patient safety including physical limitations  - Instruct patient to call for assistance with activity   - Consult OT/PT to assist with strengthening/mobility   - Keep Call bell within reach  - Keep bed low and locked with side rails adjusted as appropriate  - Keep care items and personal belongings within reach  - Initiate and maintain comfort rounds  - Make Fall Risk Sign visible to staff  -- Apply yellow socks and bracelet for high fall risk patients  - Consider moving patient to room near nurses station  Outcome: Progressing  Goal: Maintain or return to baseline ADL function  Description: INTERVENTIONS:  -  Assess patient's ability to carry out ADLs; assess patient's baseline for ADL function and identify physical deficits which impact ability to perform ADLs (bathing, care of mouth/teeth, toileting, grooming, dressing, etc )  - Assess/evaluate cause of self-care deficits   - Assess range of motion  - Assess patient's mobility; develop plan if impaired  - Assess patient's need for assistive devices and provide as appropriate  - Encourage maximum independence but intervene and supervise when necessary  - Involve family in performance of ADLs  - Assess for home care needs following discharge   - Consider OT consult to assist with ADL evaluation and planning for discharge  - Provide patient education as appropriate  Outcome: Progressing  Goal: Maintains/Returns to pre admission functional level  Description: INTERVENTIONS:  - Perform BMAT or MOVE assessment daily    - Set and communicate daily mobility goal to care team and patient/family/caregiver     - Collaborate with rehabilitation services on mobility goals if consulted  -- Record patient progress and toleration of activity level   Outcome: Progressing     Problem: DISCHARGE PLANNING  Goal: Discharge to home or other facility with appropriate resources  Description: INTERVENTIONS:  - Identify barriers to discharge w/patient and caregiver  - Arrange for needed discharge resources and transportation as appropriate  - Identify discharge learning needs (meds, wound care, etc )  - Arrange for interpretive services to assist at discharge as needed  - Refer to Case Management Department for coordinating discharge planning if the patient needs post-hospital services based on physician/advanced practitioner order or complex needs related to functional status, cognitive ability, or social support system  Outcome: Progressing

## 2023-05-12 NOTE — CASE MANAGEMENT
Case Management Progress Note    Patient name Tatianna Lee Ann  Location /-46 MRN [id number]  : 1995 Date 2023       LOS (days): 2  Geometric Mean LOS (GMLOS) (days):   Days to 85 [facility]:        OBJECTIVE:        Current admission status: Inpatient  Preferred Pharmacy:   [facility], [address] [address]  Phone: [phone] Fax: Jimena BABIN  [address] [place] [address] [facility]  [address] [facility] [address]  Phone: [phone] Fax: Luis [address] 150 ([facility] [place]) - [facility] [address] [facility] [address]  Phone: [phone] Fax: [phone]    Primary Care Provider: Jaiden Zamorano MD    Primary Insurance: [address] [place] [address],[address] [facility]  Secondary Insurance: [place] [employer]    PROGRESS NOTE:        CM met with MOB to introduce CM services, complete assessment, and provide CM contact info  MOB had alone present and verbalized agreement with personal interview with them present  MOB reported the following:    Assessment:  • Consult reason: Social Issues  -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  • Gestational Age at Birth: 38 Weeks + 5 Days  • MOB Name (& age if teen):   Sherry (MJ) Long [place]  • FOB Name (& age if teen MOB):   [place]  • Other Legal Guardian(s) for Baby:   N/A   • Other Children:   FOB has 2 daughters and a son but does not have custody of any of them  FOB has CYS hx  This is 1st baby for MOB  • Housing Plan/Lives with:   MOB currently rents a room in her  Lenka's mother in [address] [place] [address]  • Insurance Coverage/Plan for Baby: CM referred patient to [facility]    • Support System: Spouse/Significant Other, Community / and Nurse Family Partnership  • Care Items: Car Seat, Crib/Bassinet (Safe Sleep Space), Diapers/Wipes and Clothing  • Method of Feeding: Breast Feeding, Bottle Feeding and Breast Milk & Formula  • Breast Pump: CM ordering/ordered breast pump  • Government Assistance Programs: [address] (Special Supplemental Nutrition Program for Women, Infants, and Children), SNAP (Supplemental Nutrition Assistance Program), SSD (Social Security Disability) and Already established (Per  George White, they have established a [address] appt for baby now that she has arrived  •  Arrangements: MOB  • Current Employment/Schooling: MOB staying home with baby and FOB employed Part Time  • Mental Health History and/or Treatment:   ID with Fetal Alcohol Syndrome, depression on medication  • Substance Use History and/or Treatment:   Denies  • Urine Drug Screen Results: Not Applicable  • Children & Youth History: None   • Current Legal Issues: N/A  • Domestic/Intimate Partner Violence History: Denies  • NICU Resources: N/A    Discharge Plan:  • Pediatrician:    [address] [place] [address]  • Prenatal/ Care:  [facility]  • Follow-Up Appointments Needed/Scheduled: MOB - OBGYN, Baby - Peds  • Transportation Plan: FOB has a vehicle and Staff assist with transport    Follow-Up Needed from Care Management:        CM contacted MOB's  George White to review above  MOB is currently renting a room from [facility] mother-in-law and confirms MOB has all necessary baby supplies  George White states KATHY has a supports coordinator from [employer] ([facility]) and George White is employed by [employer] of the [employer]  She states she has been working with MOB for 9 years  She states she is currently working with patient to find a 1-2 BR apartment but her income is limited and she can only afford $850/month d/t FOB not always being reliable with his contribution of funds  George White confirms KATHY currently has 80hrs/week of in home care supports and is working on getting her 24 hr support for the next few months now that the baby has arrived   George White states KATHY is able to read and write struggles with perception/comprehension  CM reviewed that baby will need to bee added to insurance plan and that referral will be made to PATHS with request that they contact Elizabeth Ashley directly as she manages that type of function for KATHY Burkett understanding and appreciation

## 2023-05-12 NOTE — ASSESSMENT & PLAN NOTE
mL  Hgb 10 7 --> 7 8 --> Venofer --> 8 4  Pain well controlled  Voiding spontaneously  Appropriate bowel function  Tolerating regular diet  DVT ppx: SCDs and Lovenox 40 mg BID (BMI 41)  Breastfeeding  Lochia wnl  · Continue routine postpartum care

## 2023-05-12 NOTE — PROGRESS NOTES
"Progress Note - OB/GYN  Davey Parra 32 y o  female MRN: [id number]  Unit/Bed#:  321-01 Encounter: [id number]    Assessment and Plan     Davey Parra is a patient of: [facility]  She is PPD# 1 s/p  primary  section, low transverse incision  Recovering well and is stable       * S/P primary low transverse   Assessment & Plan  , Hgb 10 7 --> 7 8, venofer ordered  Passed VT  Pain: Tylenol and toradol scheduled, chris 5/10 PRN    FEN: Tolerating regular diet  DVT ppx: SCDs and Lovenox 40mg BID  Passing flatus   Incision C/D/I         Anemia of mother in pregnancy, antepartum, third trimester  Assessment & Plan  Last Hgb 10 3  Venofer if PPH    Major depressive disorder, recurrent 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 psychotic features ([facility] [address] )  Assessment & Plan  Good support from   Continue Zoloft    Fetal alcohol syndrome  Assessment & Plan  Associated intellectual disability  Has  with her for support  Case management ordered        Disposition    - Anticipate discharge home on PPD# 2-4      Subjective/Objective     Chief Complaint: Postpartum State     Subjective:    Davey Parra is PPD/POD#1 s/p  primary  section, low transverse incision  She has no current complaints  Pain is well controlled  Patient is currently voiding  She is ambulating  Patient is currently passing flatus and has had no bowel movement  She is tolerating PO, and denies nausea or vomitting  Patient denies fever, chills, chest pain, shortness of breath, or calf tenderness  Lochia is minimal  She is  Bottle feeding  She is recovering well and is stable         Vitals:   /58 (BP Location: Right arm)   Pulse 77   Temp 97 8 °F (36 6 °C) (Oral)   Resp 16   Ht 5' 2\" (1 575 m)   Wt 104 kg (229 lb)   LMP 2022 (Exact Date)   SpO2 96%   Breastfeeding No   BMI 41 88 kg/m²       Intake/Output Summary (Last 24 hours) at 2023 2042  Last data filed at " 5/12/2023 5615  Gross per 24 hour   Intake 2185 ml   Output 1086 ml   Net 1099 ml       Invasive Devices     Peripheral Intravenous Line  Duration           Peripheral IV 05/10/23 Dorsal (posterior); Left Wrist 1 day          Intrauterine Pressure Catheter  Duration           Intrauterine Pressure Catheter 05/10/23 2332 1 day                Physical Exam:   GEN: Aviva Willett appears well, alert and oriented x 3, pleasant and cooperative   CARDIO: RRR, no murmurs or rubs  RESP:  CTAB, no wheezes or rales  ABDOMEN: soft, no tenderness, no distention, fundus @ U-2, Incision C/D/I  EXTREMITIES: SCDs on, non tender, no erythema, b/l Flori's sign negative      Labs:     Hemoglobin   Date Value Ref Range Status   05/12/2023 7 8 (L) 11 5 - 15 4 g/dL Final     Comment:     verified by repeat   05/10/2023 10 7 (L) 11 5 - 15 4 g/dL Final   08/27/2015 14 3 11 5 - 15 4 g/dL Final   01/23/2015 13 3 11 5 - 15 4 g/dL Final     WBC   Date Value Ref Range Status   05/12/2023 15 16 (H) 4 31 - 10 16 Thousand/uL Final   05/10/2023 9 38 4 31 - 10 16 Thousand/uL Final   08/27/2015 7 15 4 31 - 10 16 Thousand/uL Final   01/23/2015 6 32 4 31 - 10 16 Thousand/uL Final     Platelets   Date Value Ref Range Status   05/12/2023 153 149 - 390 Thousands/uL Final   05/10/2023 217 149 - 390 Thousands/uL Final   08/27/2015 231 149 - 390 Thousand/uL Final   01/23/2015 209 149 - 390 Thousand/uL Final     Creatinine   Date Value Ref Range Status   08/27/2015 0 76 0 60 - 1 30 mg/dL Final     Comment:     Standardized to IDMS reference method   01/23/2015 0 63 0 60 - 1 30 mg/dL Final     Comment:     Standardized to IDMS reference method     AST   Date Value Ref Range Status   01/23/2015 13 10 - 42 U/L Final     ALT   Date Value Ref Range Status   01/23/2015 17 16 - 63 U/L Final     Comment:     New Reference Range          William Esteves MD  5/12/2023  6:51 AM

## 2023-05-12 NOTE — LACTATION NOTE
This note was copied from a baby's chart  CONSULT - LACTATION  Baby Girl ([facility] [address]) Isidro Kunz 1 days female MRN: [id number]    [facility] NURSERY Room / Bed:  321(N)/ 321(N) Encounter: [id number]    Maternal Information     MOTHER:  Chitra Ceja  Maternal Age: 32 y o    OB History: # 1 - Date: None, Sex: None, Weight: None, GA: None, Delivery: None, Apgar1: None, Apgar5: None, Living: None, Birth Comments: None    # 2 - Date: 23, Sex: Female, Weight: 3487 g (7 lb 11 oz), GA: 40w5d, Delivery: , Low Transverse, Apgar1: 8, Apgar5: 8, Living: Living, Birth Comments: None   Previouse breast reduction surgery? No    Lactation history:   Has patient previously breast fed: No   How long had patient previously breast fed:     Previous breast feeding complications:       Past Surgical History:   Procedure Laterality Date   • NO PAST SURGERIES     • NC  DELIVERY ONLY N/A 2023    Procedure:  SECTION (); Surgeon: Tawny Logan MD;  Location: AN ;  Service: Obstetrics        Birth information:  YOB: 2025   Time of birth: 8:26 AM   Sex: female   Delivery type: , Low Transverse   Birth Weight: 3485 g (7 lb 10 9 oz)   Percent of Weight Change: 0%     Gestational Age: 39w6d   [unfilled]    Assessment     Breast and nipple assessment: inverted nipple on left     Assessment: sleepy    Feeding assessment: Dyad is unable to nurse with assistance     LATCH:  Latch: Too sleepy or reluctant, no latch achieved   Audible Swallowing: None   Type of Nipple: Everted (After stimulation) (on left side; Right everted well)   Comfort (Breast/Nipple): Soft/non-tender   Hold (Positioning): Full assist, staff holds infant at breast   LATCH Score: 4          Feeding recommendations:  breast feed on demand     Met with mother earlier   Provided  with Ready, Set, Baby booklet which contained information on:  Hand expression with access to QR codes to review hand expression  Positioning and latch reviewed as well as showing images of other feeding positions  Discussed the properties of a good latch in any position  Feeding on cue and what that means for recognizing infant's hunger, s/s that baby is getting enough milk and some s/s that breastfeeding dyad may need further help No support at bedside at this time  Skin to Skin contact an benefits to mom and baby  Avoidance of pacifiers for the first month discussed  Gave information on common concerns, what to expect the first few weeks after delivery, preparing for other caregivers, and how partners can help  Resources for support also provided  Also reviewed discharge breastfeeding booklet including the feeding log  Emphasized 8 or more (12) feedings in a 24 hour period, what to expect for the number of diapers per day of life and the progression of properties of the  stooling pattern  Reviewed breastfeeding and your lifestyle, storage and preparation of breast milk, how to keep you breast pump clean, the employed breastfeeding mother and paced bottle feeding handouts  Booklet included Breastfeeding Resources for after discharge including access to the number for the [address]  In at this time for Instructions  on pumping  Discussed when to start, frequency, different pumps available versus manual expression  Discussed hygiene of hands and supplies as well as assembly, placement of flanges, size of flanged, preparing the breast and cycles and suction settings on pump  Demonstrated use of hand pump  Discussed labeling of milk, storage, and preparation of stored milk  Mom was also open to attempt latch attempt  Worked on positioning infant up at chest level and starting to feed infant with nose arriving at the nipple   Then, using areolar compression to achieve a deep latch that is comfortable and exchanges optimum amounts of milk  Right breast everted using football hold  A few piston sucks for a few attempts  Then left breast also using football hold for a few piston sucks  Then paced bottle feeding for slow flow nipple  Mom nervous about care  Encoraged MOB  to call for assistance, questions and concerns  Extension number for inpatient lactation support provided            William Story RN 5/12/2023 4:36 PM

## 2023-05-12 NOTE — CASE MANAGEMENT
Case Management Progress Note    Patient name Ailin Yusuf  Location /-68 MRN [id number]  : 1995 Date 2023       LOS (days): 2  Geometric Mean LOS (GMLOS) (days):   Days to [facility]:        OBJECTIVE:        Current admission status: Inpatient  Preferred Pharmacy:   [facility], [address] [address]  Phone: [phone] Fax: Jimena BABIN  15  [address], [address] [facility] [address] [address]  Phone: [phone] Fax: Luis [address] ([place] [address]) - [address]  [address] [place] [address]  Phone: [phone] Fax: [phone]    Primary Care Provider: Bob Brito MD    Primary Insurance: [address] [place] [address]  [place]    PROGRESS NOTE:      CM received consult for MOB requesting medela pump for home use  Order placed via parachute to [address] [place] [address]  Pump to be delivered to bedside by SP liaison Dottie Nair

## 2023-05-12 NOTE — PLAN OF CARE
Problem: POSTPARTUM  Goal: Experiences normal postpartum course  Description: INTERVENTIONS:  - Monitor maternal vital signs  - Assess uterine involution and lochia  Outcome: Progressing  Goal: Appropriate maternal -  bonding  Description: INTERVENTIONS:  - Identify family support  - Assess for appropriate maternal/infant bonding   -Encourage maternal/infant bonding opportunities  - Referral to  or  as needed  Outcome: Progressing  Goal: Establishment of infant feeding pattern  Description: INTERVENTIONS:  - Assess breast/bottle feeding  - Refer to lactation as needed  Outcome: Progressing  Goal: Incision(s), wounds(s) or drain site(s) healing without S/S of infection  Description: INTERVENTIONS  - Assess and document dressing, incision, wound bed, drain sites and surrounding tissue  - Provide patient and family education    Outcome: Progressing     Problem: PAIN - ADULT  Goal: Verbalizes/displays adequate comfort level or baseline comfort level  Description: Interventions:  - Encourage patient to monitor pain and request assistance  - Assess pain using appropriate pain scale  - Administer analgesics based on type and severity of pain and evaluate response  - Implement non-pharmacological measures as appropriate and evaluate response  - Consider cultural and social influences on pain and pain management  - Notify physician/advanced practitioner if interventions unsuccessful or patient reports new pain  Outcome: Progressing     Problem: INFECTION - ADULT  Goal: Absence or prevention of progression during hospitalization  Description: INTERVENTIONS:  - Assess and monitor for signs and symptoms of infection  - Monitor lab/diagnostic results  - Monitor all insertion sites, i e  indwelling lines, tubes, and drains  - Monitor endotracheal if appropriate and nasal secretions for changes in amount and color  - [place] appropriate cooling/warming therapies per order  - Administer medications as ordered  - Instruct and encourage patient and family to use good hand hygiene technique  - Identify and instruct in appropriate isolation precautions for identified infection/condition  Outcome: Progressing  Goal: Absence of fever/infection during neutropenic period  Description: INTERVENTIONS:  - Monitor WBC    Outcome: Progressing     Problem: SAFETY ADULT  Goal: Patient will remain free of falls  Description: INTERVENTIONS:  - Educate patient/family on patient safety including physical limitations  - Instruct patient to call for assistance with activity   - Consult OT/PT to assist with strengthening/mobility   - Keep Call bell within reach  - Keep bed low and locked with side rails adjusted as appropriate  - Keep care items and personal belongings within reach  - Initiate and maintain comfort rounds  - Make Fall Risk Sign visible to staff  -- Apply yellow socks and bracelet for high fall risk patients  - Consider moving patient to room near nurses station  Outcome: Progressing  Goal: Maintain or return to baseline ADL function  Description: INTERVENTIONS:  -  Assess patient's ability to carry out ADLs; assess patient's baseline for ADL function and identify physical deficits which impact ability to perform ADLs (bathing, care of mouth/teeth, toileting, grooming, dressing, etc )  - Assess/evaluate cause of self-care deficits   - Assess range of motion  - Assess patient's mobility; develop plan if impaired  - Assess patient's need for assistive devices and provide as appropriate  - Encourage maximum independence but intervene and supervise when necessary  - Involve family in performance of ADLs  - Assess for home care needs following discharge   - Consider OT consult to assist with ADL evaluation and planning for discharge  - Provide patient education as appropriate  Outcome: Progressing  Goal: Maintains/Returns to pre admission functional level  Description: INTERVENTIONS:  - Perform BMAT or MOVE assessment daily    - Set and communicate daily mobility goal to care team and patient/family/caregiver     - Collaborate with rehabilitation services on mobility goals if consulted    - Out of bed for toileting  - Record patient progress and toleration of activity level   Outcome: Progressing     Problem: DISCHARGE PLANNING  Goal: Discharge to home or other facility with appropriate resources  Description: INTERVENTIONS:  - Identify barriers to discharge w/patient and caregiver  - Arrange for needed discharge resources and transportation as appropriate  - Identify discharge learning needs (meds, wound care, etc )  - Arrange for interpretive services to assist at discharge as needed  - Refer to Case Management Department for coordinating discharge planning if the patient needs post-hospital services based on physician/advanced practitioner order or complex needs related to functional status, cognitive ability, or social support system  Outcome: Progressing

## 2023-05-13 RX ORDER — DOCUSATE SODIUM 100 MG/1
100 CAPSULE, LIQUID FILLED ORAL 2 TIMES DAILY PRN
Refills: 0
Start: 2023-05-13

## 2023-05-13 RX ORDER — OXYCODONE HYDROCHLORIDE 5 MG/1
5 TABLET ORAL EVERY 4 HOURS PRN
Qty: 7 TABLET | Refills: 0 | Status: CANCELLED | OUTPATIENT
Start: 2023-05-13 | End: 2023-05-23

## 2023-05-13 RX ORDER — ACETAMINOPHEN 325 MG/1
650 TABLET ORAL EVERY 6 HOURS PRN
Qty: 16 TABLET | Refills: 0
Start: 2023-05-13

## 2023-05-13 RX ORDER — IBUPROFEN 600 MG/1
600 TABLET ORAL EVERY 6 HOURS PRN
Qty: 30 TABLET | Refills: 0
Start: 2023-05-13

## 2023-05-13 RX ADMIN — SERTRALINE HYDROCHLORIDE 100 MG: 100 TABLET ORAL at 10:39

## 2023-05-13 RX ADMIN — FAMOTIDINE 20 MG: 20 TABLET, FILM COATED ORAL at 18:13

## 2023-05-13 RX ADMIN — ENOXAPARIN SODIUM 40 MG: 40 INJECTION SUBCUTANEOUS at 10:38

## 2023-05-13 RX ADMIN — Medication 1 TABLET: at 10:39

## 2023-05-13 RX ADMIN — ACETAMINOPHEN 650 MG: 325 TABLET ORAL at 18:12

## 2023-05-13 RX ADMIN — DOCUSATE SODIUM 100 MG: 100 CAPSULE, LIQUID FILLED ORAL at 10:38

## 2023-05-13 RX ADMIN — IBUPROFEN 600 MG: 600 TABLET, FILM COATED ORAL at 05:29

## 2023-05-13 RX ADMIN — ACETAMINOPHEN 650 MG: 325 TABLET ORAL at 10:37

## 2023-05-13 RX ADMIN — IBUPROFEN 600 MG: 600 TABLET, FILM COATED ORAL at 22:12

## 2023-05-13 RX ADMIN — ENOXAPARIN SODIUM 40 MG: 40 INJECTION SUBCUTANEOUS at 23:21

## 2023-05-13 RX ADMIN — FERROUS SULFATE TAB 325 MG (65 MG ELEMENTAL FE) 325 MG: 325 (65 FE) TAB at 08:13

## 2023-05-13 RX ADMIN — FAMOTIDINE 20 MG: 20 TABLET, FILM COATED ORAL at 10:39

## 2023-05-13 RX ADMIN — DOCUSATE SODIUM 100 MG: 100 CAPSULE, LIQUID FILLED ORAL at 18:13

## 2023-05-13 NOTE — PLAN OF CARE
Problem: POSTPARTUM  Goal: Experiences normal postpartum course  Description: INTERVENTIONS:  - Monitor maternal vital signs  - Assess uterine involution and lochia  Outcome: Progressing  Goal: Appropriate maternal -  bonding  Description: INTERVENTIONS:  - Identify family support  - Assess for appropriate maternal/infant bonding   -Encourage maternal/infant bonding opportunities  - Referral to  or  as needed  Outcome: Progressing  Goal: Establishment of infant feeding pattern  Description: INTERVENTIONS:  - Assess breast/bottle feeding  - Refer to lactation as needed  Outcome: Progressing  Goal: Incision(s), wounds(s) or drain site(s) healing without S/S of infection  Description: INTERVENTIONS  - Assess and document dressing, incision, wound bed, drain sites and surrounding tissue  - Provide patient and family education  - Perform skin care/dressing changes every   Outcome: Progressing     Problem: PAIN - ADULT  Goal: Verbalizes/displays adequate comfort level or baseline comfort level  Description: Interventions:  - Encourage patient to monitor pain and request assistance  - Assess pain using appropriate pain scale  - Administer analgesics based on type and severity of pain and evaluate response  - Implement non-pharmacological measures as appropriate and evaluate response  - Consider cultural and social influences on pain and pain management  - Notify physician/advanced practitioner if interventions unsuccessful or patient reports new pain  Outcome: Progressing     Problem: INFECTION - ADULT  Goal: Absence or prevention of progression during hospitalization  Description: INTERVENTIONS:  - Assess and monitor for signs and symptoms of infection  - Monitor lab/diagnostic results  - Monitor all insertion sites, i e  indwelling lines, tubes, and drains  - Monitor endotracheal if appropriate and nasal secretions for changes in amount and color  - [place] appropriate cooling/warming therapies per order  - Administer medications as ordered  - Instruct and encourage patient and family to use good hand hygiene technique  - Identify and instruct in appropriate isolation precautions for identified infection/condition  Outcome: Progressing  Goal: Absence of fever/infection during neutropenic period  Description: INTERVENTIONS:  - Monitor WBC    Outcome: Progressing     Problem: SAFETY ADULT  Goal: Patient will remain free of falls  Description: INTERVENTIONS:  - Educate patient/family on patient safety including physical limitations  - Instruct patient to call for assistance with activity   - Consult OT/PT to assist with strengthening/mobility   - Keep Call bell within reach  - Keep bed low and locked with side rails adjusted as appropriate  - Keep care items and personal belongings within reach  - Initiate and maintain comfort rounds  - Make Fall Risk Sign visible to staff  - Offer Toileting every  Hours, in advance of need  - Initiate/Maintainalarm  - Obtain necessary fall risk management equipment:   - Apply yellow socks and bracelet for high fall risk patients  - Consider moving patient to room near nurses station  Outcome: Progressing  Goal: Maintain or return to baseline ADL function  Description: INTERVENTIONS:  -  Assess patient's ability to carry out ADLs; assess patient's baseline for ADL function and identify physical deficits which impact ability to perform ADLs (bathing, care of mouth/teeth, toileting, grooming, dressing, etc )  - Assess/evaluate cause of self-care deficits   - Assess range of motion  - Assess patient's mobility; develop plan if impaired  - Assess patient's need for assistive devices and provide as appropriate  - Encourage maximum independence but intervene and supervise when necessary  - Involve family in performance of ADLs  - Assess for home care needs following discharge   - Consider OT consult to assist with ADL evaluation and planning for discharge  - Provide patient education as appropriate  Outcome: Progressing  Goal: Maintains/Returns to pre admission functional level  Description: INTERVENTIONS:  - Perform BMAT or MOVE assessment daily    - Set and communicate daily mobility goal to care team and patient/family/caregiver  - Collaborate with rehabilitation services on mobility goals if consulted  - Perform Range of Motion  times a day  - Reposition patient every  hours    - Dangle patient  times a day  - Stand patient times a day  - Ambulate patient  times a day  - Out of bed to chair  times a day   - Out of bed for meals  times a day  - Out of bed for toileting  - Record patient progress and toleration of activity level   Outcome: Progressing     Problem: DISCHARGE PLANNING  Goal: Discharge to home or other facility with appropriate resources  Description: INTERVENTIONS:  - Identify barriers to discharge w/patient and caregiver  - Arrange for needed discharge resources and transportation as appropriate  - Identify discharge learning needs (meds, wound care, etc )  - Arrange for interpretive services to assist at discharge as needed  - Refer to Case Management Department for coordinating discharge planning if the patient needs post-hospital services based on physician/advanced practitioner order or complex needs related to functional status, cognitive ability, or social support system  Outcome: Progressing

## 2023-05-13 NOTE — PROGRESS NOTES
"Progress Note - OB/GYN  Suzanna Garcia 32 y o  female MRN: [id number]  Unit/Bed#: -01 Encounter: [id number]    Assessment and Plan     Suzanna Garcia is a patient of: [facility]  She is PPD# 2 s/p  primary  section, low transverse incision  Recovering well and is stable       Anemia of mother in pregnancy, antepartum, third trimester  Assessment & Plan  Last Hgb 10 3 -> 7 8, s/p venofer--> 8 4    Major depressive disorder, recurrent 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 psychotic features ([facility] [address] )  Assessment & Plan  Good support from   Continue Zoloft    Fetal alcohol syndrome  Assessment & Plan  Associated intellectual disability  Has  with her for support  Case management ordered    * S/P primary low transverse   Assessment & Plan  , Hgb 10 7 --> 7 8, venofer ordered -> 8 4  Passed VT  Pain: Tylenol and motrin scheduled, chris 5/10 PRN    FEN: Tolerating regular diet  DVT ppx: SCDs and Lovenox 40mg BID  Passing flatus   Incision C/D/I           Disposition    - Anticipate discharge home on PPD# 3-4      Subjective/Objective     Chief Complaint: Postpartum State     Subjective:    Suzanna Garcia is PPD/POD#2 s/p  primary  section, low transverse incision  She has no current complaints  Pain is well controlled  Patient is currently voiding  She is ambulating  Patient is currently passing flatus and has had no bowel movement  She is tolerating PO, and denies nausea or vomitting  Patient denies fever, chills, chest pain, shortness of breath, or calf tenderness  Lochia is minimal  She is  Bottle feeding and breastfeeding  She is recovering well and is stable         Vitals:   /65 (BP Location: Right arm)   Pulse 80   Temp 98 2 °F (36 8 °C) (Oral)   Resp 18   Ht 5' 2\" (1 575 m)   Wt 104 kg (229 lb)   LMP 2022 (Exact Date)   SpO2 98%   Breastfeeding No   BMI 41 88 kg/m²     No intake or output data in the 24 hours ending " 05/13/23 0751    Invasive Devices     Peripheral Intravenous Line  Duration           Peripheral IV 05/10/23 Dorsal (posterior); Left Wrist 2 days          Intrauterine Pressure Catheter  Duration           Intrauterine Pressure Catheter 05/10/23 2332 2 days                Physical Exam:   GEN: Orlando Alvarado appears well, alert and oriented x 3, pleasant and cooperative   CARDIO: RRR, no murmurs or rubs  RESP:  CTAB, no wheezes or rales  ABDOMEN: soft, no tenderness, no distention, fundus @ U-2, Incision C/D/I  EXTREMITIES: SCDs on, non tender, no erythema, b/l Flori's sign negative      Labs:     Hemoglobin   Date Value Ref Range Status   05/12/2023 8 4 (L) 11 5 - 15 4 g/dL Final   05/12/2023 7 8 (L) 11 5 - 15 4 g/dL Final     Comment:     verified by repeat   08/27/2015 14 3 11 5 - 15 4 g/dL Final   01/23/2015 13 3 11 5 - 15 4 g/dL Final     WBC   Date Value Ref Range Status   05/12/2023 12 85 (H) 4 31 - 10 16 Thousand/uL Final   05/12/2023 15 16 (H) 4 31 - 10 16 Thousand/uL Final   08/27/2015 7 15 4 31 - 10 16 Thousand/uL Final   01/23/2015 6 32 4 31 - 10 16 Thousand/uL Final     Platelets   Date Value Ref Range Status   05/12/2023 170 149 - 390 Thousands/uL Final   05/12/2023 153 149 - 390 Thousands/uL Final   08/27/2015 231 149 - 390 Thousand/uL Final   01/23/2015 209 149 - 390 Thousand/uL Final     Creatinine   Date Value Ref Range Status   08/27/2015 0 76 0 60 - 1 30 mg/dL Final     Comment:     Standardized to IDMS reference method   01/23/2015 0 63 0 60 - 1 30 mg/dL Final     Comment:     Standardized to IDMS reference method     AST   Date Value Ref Range Status   01/23/2015 13 10 - 42 U/L Final     ALT   Date Value Ref Range Status   01/23/2015 17 16 - 63 U/L Final     Comment:     New Reference Range          Stephen Alvarado MD  5/13/2023  7:51 AM

## 2023-05-13 NOTE — CASE MANAGEMENT
Case Management Discharge Planning Note    Patient name Jacque Simon  Location  321/-89 MRN [id number]  : 1995 Date 2023       Current Admission Date: 5/10/2023  Current Admission Diagnosis:S/P primary low transverse    Patient Active Problem List    Diagnosis Date Noted   • S/P primary low transverse  2023   • Status post primary low transverse  section 2023   • Contraception management 2023   • Positive GBS test 2023   • Polyhydramnios in third trimester 2023   • Maternal morbid obesity in third trimester, antepartum ([facility] [address] ) 2023   • Anemia of mother in pregnancy, antepartum, third trimester 2023   • Major depressive disorder, recurrent 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 psychotic features ([facility] [address] ) 11/10/2022   • PTSD (post-traumatic stress disorder) 11/10/2022   • 40 weeks gestation of pregnancy 2022   • Urinary frequency 2022   • Chronic midline low back pain without sciatica 02/10/2022   • Acute thoracic myofascial strain 2022   • Anxiety and depression 2021   • Migraine without aura, not intractable 2020   • Major depression, recurrent, chronic ([facility] [address] ) 2018   • Mild intellectual disability 2016   • GERD (gastroesophageal reflux disease) 2015   • Fetal alcohol syndrome 2014      LOS (days): 3  Geometric Mean LOS (GMLOS) (days):   Days to GMLOS:     OBJECTIVE:  Risk of Unplanned Readmission Score: 5 19         Current admission status: Inpatient   [facility]  Phone: [phone] Fax: [address] [address]  Phone: [phone] Fax: Luis E Angeles ([facility]) [place] [address]  Phone: [phone] Fax: [phone]    Primary Care Provider: Arsh Leblanc MD    Primary Insurance: [address] [facility] [facility]  [place]    DISCHARGE DETAILS:                         Patient and baby have no outstanding case management needs for discharge at this time

## 2023-05-14 VITALS
HEIGHT: 62 IN | RESPIRATION RATE: 18 BRPM | DIASTOLIC BLOOD PRESSURE: 70 MMHG | HEART RATE: 77 BPM | SYSTOLIC BLOOD PRESSURE: 118 MMHG | TEMPERATURE: 98.4 F | BODY MASS INDEX: 42.14 KG/M2 | OXYGEN SATURATION: 98 % | WEIGHT: 229 LBS

## 2023-05-14 RX ADMIN — ACETAMINOPHEN 650 MG: 325 TABLET ORAL at 00:03

## 2023-05-14 RX ADMIN — ACETAMINOPHEN 650 MG: 325 TABLET ORAL at 09:05

## 2023-05-14 RX ADMIN — IBUPROFEN 600 MG: 600 TABLET, FILM COATED ORAL at 04:23

## 2023-05-14 RX ADMIN — FAMOTIDINE 20 MG: 20 TABLET, FILM COATED ORAL at 09:05

## 2023-05-14 RX ADMIN — SERTRALINE HYDROCHLORIDE 100 MG: 100 TABLET ORAL at 09:05

## 2023-05-14 RX ADMIN — ENOXAPARIN SODIUM 40 MG: 40 INJECTION SUBCUTANEOUS at 09:05

## 2023-05-14 RX ADMIN — Medication 1 TABLET: at 09:05

## 2023-05-14 RX ADMIN — DOCUSATE SODIUM 100 MG: 100 CAPSULE, LIQUID FILLED ORAL at 09:05

## 2023-05-14 NOTE — CASE MANAGEMENT
Case Management Discharge Planning Note    Patient name Valdene Goltz  Location /-88 MRN [id number]  : 1995 Date 2023       Current Admission Date: 5/10/2023  Current Admission Diagnosis:S/P primary low transverse    Patient Active Problem List    Diagnosis Date Noted   • S/P primary low transverse  2023   • Contraception management 2023   • Positive GBS test 2023   • Polyhydramnios in third trimester 2023   • Maternal morbid obesity in third trimester, antepartum ([facility] [address] ) 2023   • Anemia of mother in pregnancy, antepartum, third trimester 2023   • Major depressive disorder, recurrent 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 psychotic features ([facility] [address] ) 11/10/2022   • PTSD (post-traumatic stress disorder) 11/10/2022   • 40 weeks gestation of pregnancy 2022   • Urinary frequency 2022   • Chronic midline low back pain without sciatica 02/10/2022   • Acute thoracic myofascial strain 2022   • Anxiety and depression 2021   • Migraine without aura, not intractable 2020   • Major depression, recurrent, chronic ([facility] [address] ) 2018   • Mild intellectual disability 2016   • GERD (gastroesophageal reflux disease) 2015   • Fetal alcohol syndrome 2014      LOS (days): 4  Geometric Mean LOS (GMLOS) (days):   Days to GMLOS:     OBJECTIVE:  Risk of Unplanned Readmission Score: 5 26         Current admission status: Inpatient   Preferred Pharmacy:   [address] [place] [address] Jose Hubbard, [address] [place] [address] Drive  Phone: [phone] Fax: [address] [address]  Phone: [phone] Fax: [address] [place] [address]  Phone: [phone] Fax: [phone]    Primary Care Provider: Val Handy MD    Primary Insurance: [facility] REP  Secondary Insurance: [place] [employer]    DISCHARGE DETAILS:                                          Other Referral/Resources/Interventions Provided:  Interventions: Other (Specify) (Post-Partum Support Group information provided)         Treatment Team Recommendation: Home  Discharge Destination Plan[de-identified] Home  Transport at Discharge : Automobile                                         CM consult received for high Post-Partum Depression score  CM met with patient and spoke with KATHY's  Renae Malik  At baseline KATHY has Major depressive disorder, recurrent 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 psychotic features, which she takes medication for  Renae Gan and all home care staff are a good support for patient  Patient takes medication that is currently being prescribed by her PCP or OBGYN as she is on the wait list to get a psychiatrist through [facility]  KATHY was provided with information on the Post-Partum Support Groups available  KATHY feels supported going home  MOB and NIKO were able to get a car seat yesterday  Transportation plan will be either with FOB or one of the home care staff will pick her up once MOB is ready  No further CM DC needs were discussed or identified in order for patient to safely DC home at this time

## 2023-05-14 NOTE — DISCHARGE SUMMARY
Obstetrics Discharge Summary   Jose Parson 32 y o  female MRN: [id number]  Unit/Bed#: -01 Encounter: [id number]    Admission Date: 5/10/2023     Discharge Date: 23    Admitting Diagnoses:   40w5d gestational age pregnancy   Polyhydramnios  Anemia   Intellectual disability ( Fetal Alcohol Syndrome)  Major depression disorder  Obesity    Category II fetal heart rate tracing during labor and delivery [O76]  GBS positive status       Discharge Diagnoses:   Same, delivered    Procedures:   primary  section, low transverse incision    Admitting Attending: Dr Serena Dexter  Delivery Attending: Dr Serena Dexter  Discharge Attending: Dr Leonor Judge Course:   Jose Parson is now a 32 y o  [facility] who was initially admitted at 40w4d for induction of labor by maternal request A win balloon was placed to start the induction   Pitocin and Amniotomy was performed to augment labor  Meconium stained fluid was noted upon AROM  Patient progressed to 6/80/-2 and noted to have intermittent category II tracing  An IUPC and FSE was placed to monitor FHT  Patient continued to have intermittent CAT II  Tracing with recurrent late deceleration and pitocin was discontinued  Pitocin was restarted and patient was noted  progress to 6/90/-2  She continued to have prolonged decels and pitocin was discontinued for the second time  Due to persistent CAT II tracing patient was offered a  section  Patient expressed understanding of the risks associated with the procedure and was consented for a  delivery    She underwent an uncomplicated  low transverse  section delivery on 23 and delivered a viable female  at 46  APGARS were 8, 8 at 1 and 5 minutes, respectively  's birth weight was 7 lb 11 oz  Placenta delivered without difficulty   was admitted to the  nursery   Patient tolerated the procedure well and was transferred to recovery in stable condition  The patient's post partum course was unremarkable    Her preoperative hemoglobin was 10 7 g/dL, postoperative was 7 8, she received a dose of Venofer, and Hgb stable at 8 4  Her postoperative pain was well controlled with oral analgesics  On day of discharge, she was ambulating and able to reasonably perform all ADLs  She was voiding and had appropriate bowel function  Pain was well controlled  She was discharged home on post-operative day #3 without complications  Patient was instructed to follow up with her OB as an outpatient and was given appropriate warnings to call provider if she develops signs of infection or uncontrolled pain  She is breast feeding and bottle feeding   Mom's blood type is A positive  RhoGAM was not indicated      Complications:   None    Condition at discharge:   good     Provisions for Follow-Up Care:  See after visit summary for information related to follow-up care and any pertinent home health orders  Disposition:   Home    Planned Readmission:   No    Discharge Medications:   Please see AVS for a complete list of discharge medications  Discharge instructions :   Please see AVS for complete discharge instructions

## 2023-05-14 NOTE — PROGRESS NOTES
"Progress Note - OB/GYN  Tanmay Gonzales 32 y o  female MRN: [id number]  Unit/Bed#: -01 Encounter: [id number]    Assessment and Plan:  Tanmay Gonzales is a patient of: [facility]  She is PPD# 3 s/p  primary  section, low transverse incision for tones  Recovering well and is stable  By problem:    * S/P primary low transverse   Assessment & Plan   mL  Hgb 10 7 --> 7 8 --> Venofer --> 8 4  Pain well controlled  Voiding spontaneously  Appropriate bowel function  Tolerating regular diet  DVT ppx: SCDs and Lovenox 40 mg BID (BMI 41)  Breastfeeding  Lochia wnl  · Continue routine postpartum care    Anemia of mother in pregnancy, antepartum, third trimester  Assessment & Plan  Hgb 10 7 --> 7 8 --> Venofer --> 8 4    Major depressive disorder, recurrent 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 psychotic features [facility])  Assessment & Plan  Good support from   EPDS score 13  · Continue home Zoloft  · S/p case management consult    Fetal alcohol syndrome  Assessment & Plan  Associated intellectual disability  Has  with her for support  S/p Case management consult      Disposition    - Anticipate discharge home on PPD# 3 today      Subjective/Objective     Chief Complaint: Postpartum State     Subjective:    Tanmay Gonzales is PPD/POD#3 s/p  primary  section, low transverse incision for tones  She has no current complaints  Pain is well controlled  Patient is currently voiding  She is ambulating  Patient is currently passing flatus and has had no bowel movement  She is tolerating PO, and denies nausea or vomitting  Patient denies fever, chills, chest pain, shortness of breath, or calf tenderness  Lochia is normal  She is  Breastfeeding  She is recovering well and is stable         Vitals:   /66   Pulse 77   Temp 99 5 °F (37 5 °C) (Oral)   Resp 16   Ht 5' 2\" (1 575 m)   Wt 104 kg (229 lb)   LMP 2022 (Exact Date)   SpO2 98%   " Breastfeeding No   BMI 41 88 kg/m²     No intake or output data in the 24 hours ending 05/14/23 0837    Invasive Devices     Peripheral Intravenous Line  Duration           Peripheral IV 05/10/23 Dorsal (posterior); Left Wrist 3 days          Intrauterine Pressure Catheter  Duration           Intrauterine Pressure Catheter 05/10/23 2332 3 days                Physical Exam:   GEN: Ele Trivedi appears well, alert and oriented x 3, pleasant and cooperative   CARDIO: warm and well perfused  RESP: non-labored breathing  ABDOMEN: soft, no tenderness, no distention, fundus @ U -3, Incision C/D/I  EXTREMITIES: SCDs on, non tender, no erythema, b/l Flori's sign negative      Labs:     Hemoglobin   Date Value Ref Range Status   05/12/2023 8 4 (L) 11 5 - 15 4 g/dL Final   05/12/2023 7 8 (L) 11 5 - 15 4 g/dL Final     Comment:     verified by repeat   08/27/2015 14 3 11 5 - 15 4 g/dL Final   01/23/2015 13 3 11 5 - 15 4 g/dL Final     WBC   Date Value Ref Range Status   05/12/2023 12 85 (H) 4 31 - 10 16 Thousand/uL Final   05/12/2023 15 16 (H) 4 31 - 10 16 Thousand/uL Final   08/27/2015 7 15 4 31 - 10 16 Thousand/uL Final   01/23/2015 6 32 4 31 - 10 16 Thousand/uL Final     Platelets   Date Value Ref Range Status   05/12/2023 170 149 - 390 Thousands/uL Final   05/12/2023 153 149 - 390 Thousands/uL Final   08/27/2015 231 149 - 390 Thousand/uL Final   01/23/2015 209 149 - 390 Thousand/uL Final     Creatinine   Date Value Ref Range Status   08/27/2015 0 76 0 60 - 1 30 mg/dL Final     Comment:     Standardized to IDMS reference method   01/23/2015 0 63 0 60 - 1 30 mg/dL Final     Comment:     Standardized to IDMS reference method     AST   Date Value Ref Range Status   01/23/2015 13 10 - 42 U/L Final     ALT   Date Value Ref Range Status   01/23/2015 17 16 - 63 U/L Final     Comment:     New Reference Range          Kavya Arellano MD  5/14/2023  8:37 AM

## 2023-05-14 NOTE — PLAN OF CARE
Problem: POSTPARTUM  Goal: Experiences normal postpartum course  Description: INTERVENTIONS:  - Monitor maternal vital signs  - Assess uterine involution and lochia  Outcome: Progressing  Goal: Appropriate maternal -  bonding  Description: INTERVENTIONS:  - Identify family support  - Assess for appropriate maternal/infant bonding   -Encourage maternal/infant bonding opportunities  - Referral to  or  as needed  Outcome: Progressing  Goal: Establishment of infant feeding pattern  Description: INTERVENTIONS:  - Assess breast/bottle feeding  - Refer to lactation as needed  Outcome: Progressing  Goal: Incision(s), wounds(s) or drain site(s) healing without S/S of infection  Description: INTERVENTIONS  - Assess and document dressing, incision, wound bed, drain sites and surrounding tissue  - Provide patient and family education  - Perform skin care/dressing changes every   Outcome: Progressing     Problem: PAIN - ADULT  Goal: Verbalizes/displays adequate comfort level or baseline comfort level  Description: Interventions:  - Encourage patient to monitor pain and request assistance  - Assess pain using appropriate pain scale  - Administer analgesics based on type and severity of pain and evaluate response  - Implement non-pharmacological measures as appropriate and evaluate response  - Consider cultural and social influences on pain and pain management  - Notify physician/advanced practitioner if interventions unsuccessful or patient reports new pain  Outcome: Progressing     Problem: INFECTION - ADULT  Goal: Absence or prevention of progression during hospitalization  Description: INTERVENTIONS:  - Assess and monitor for signs and symptoms of infection  - Monitor lab/diagnostic results  - Monitor all insertion sites, i e  indwelling lines, tubes, and drains  - Monitor endotracheal if appropriate and nasal secretions for changes in amount and color  - [place] appropriate cooling/warming therapies per order  - Administer medications as ordered  - Instruct and encourage patient and family to use good hand hygiene technique  - Identify and instruct in appropriate isolation precautions for identified infection/condition  Outcome: Progressing  Goal: Absence of fever/infection during neutropenic period  Description: INTERVENTIONS:  - Monitor WBC    Outcome: Progressing     Problem: SAFETY ADULT  Goal: Patient will remain free of falls  Description: INTERVENTIONS:  - Educate patient/family on patient safety including physical limitations  - Instruct patient to call for assistance with activity   - Consult OT/PT to assist with strengthening/mobility   - Keep Call bell within reach  - Keep bed low and locked with side rails adjusted as appropriate  - Keep care items and personal belongings within reach  - Initiate and maintain comfort rounds  - Make Fall Risk Sign visible to staff  - Offer Toileting every  Hours, in advance of need  - Initiate/Maintain alarm  - Obtain necessary fall risk management equipment:   - Apply yellow socks and bracelet for high fall risk patients  - Consider moving patient to room near nurses station  Outcome: Progressing  Goal: Maintain or return to baseline ADL function  Description: INTERVENTIONS:  -  Assess patient's ability to carry out ADLs; assess patient's baseline for ADL function and identify physical deficits which impact ability to perform ADLs (bathing, care of mouth/teeth, toileting, grooming, dressing, etc )  - Assess/evaluate cause of self-care deficits   - Assess range of motion  - Assess patient's mobility; develop plan if impaired  - Assess patient's need for assistive devices and provide as appropriate  - Encourage maximum independence but intervene and supervise when necessary  - Involve family in performance of ADLs  - Assess for home care needs following discharge   - Consider OT consult to assist with ADL evaluation and planning for discharge  - Provide patient education as appropriate  Outcome: Progressing  Goal: Maintains/Returns to pre admission functional level  Description: INTERVENTIONS:  - Perform BMAT or MOVE assessment daily    - Set and communicate daily mobility goal to care team and patient/family/caregiver  - Collaborate with rehabilitation services on mobility goals if consulted  - Perform Range of Motion  times a day  - Reposition patient every  hours    - Dangle patient  times a day  - Stand patient  times a day  - Ambulate patient  times a day  - Out of bed to chair  times a day   - Out of bed for meals times a day  - Out of bed for toileting  - Record patient progress and toleration of activity level   Outcome: Progressing     Problem: DISCHARGE PLANNING  Goal: Discharge to home or other facility with appropriate resources  Description: INTERVENTIONS:  - Identify barriers to discharge w/patient and caregiver  - Arrange for needed discharge resources and transportation as appropriate  - Identify discharge learning needs (meds, wound care, etc )  - Arrange for interpretive services to assist at discharge as needed  - Refer to Case Management Department for coordinating discharge planning if the patient needs post-hospital services based on physician/advanced practitioner order or complex needs related to functional status, cognitive ability, or social support system  Outcome: Progressing

## 2023-05-15 NOTE — UTILIZATION REVIEW
"NOTIFICATION OF INPATIENT ADMISSION   MATERNITY/DELIVERY AUTHORIZATION REQUEST   SERVICING FACILITY:   [facility], [place], NICU  [address]  Tax ID: [id number]  NPI: [id number]   ATTENDING PROVIDER:  Attending Name and NPI#: Azra Weaver [[id number]]  Address: [address] [place] [address]  Phone: [phone]   ADMISSION INFORMATION:  Place of Service: Inpatient [address] [facility]  [address]  Place of Service Code: 21  Inpatient Admission Date/Time: 5/10/23  3:33 PM  Discharge Date/Time: 2023 12:41 PM  Admitting Diagnosis Code/Description:  Encounter for induction of labor [Z34 90]  Post term pregnancy over 40 weeks [O48 0]  Encounter for full-term uncomplicated delivery [Q20]     Mother: Davey Parra 1995 Estimated Date of Delivery: 23  Delivering clinician: Kristina Deras    OB History        2    Para   1    Term   1       0    AB   1    Living   1       SAB   0    IAB   0    Ectopic   1    Multiple   0    Live Births   1           Obstetric Comments   Ectopic pregnancy ~2019            Name & MRN:   Information for the patient's :  Arvjose carlos Or [[id number]]     [place] Delivery Information:  Sex: female  Delivered 2023 8:26 AM by , Low Transverse; Gestational Age: 39w6d     Measurements:  Weight: 7 lb 10 9 oz (3485 g); Height: 20\"    APGAR 1 minute 5 minutes 10 minutes   Totals: 8 8      [place] Birth Information: 32 y o  female MRN: [id number] Unit/Bed#: LD OVR   Birthweight: No birth weight on file  Gestational Age: <None> Delivery Type:    APGARS Totals:        UTILIZATION REVIEW CONTACT:  Misa Maya Utilization   Network Utilization Review Department  Phone: [phone]  Fax [phone]  Email: [email or web address]  Contact for approvals/pending authorizations, clinical reviews, and discharge     " PHYSICIAN ADVISORY SERVICES:  Medical Necessity Denial & Fsdr-em-Ccwd Review  Phone: [phone]  Fax: [phone]  Email: [email or web address]

## 2023-05-16 PROCEDURE — T1002 RN SERVICES UP TO 15 MINUTES: HCPCS

## 2023-05-17 NOTE — UTILIZATION REVIEW
"NOTIFICATION OF INPATIENT ADMISSION   MATERNITY/DELIVERY AUTHORIZATION REQUEST   SERVICING FACILITY:   [facility], [place], NICU  [address]  Tax ID: [id number]  NPI: [id number]   ATTENDING PROVIDER:  Attending Name and NPI#: Azra Dumont [[id number]]  Address: [address] [place] [address]  Phone: [phone]   ADMISSION INFORMATION:  Place of Service: Inpatient [address] [facility]  [address]  Place of Service Code: 21  Inpatient Admission Date/Time: 5/10/23  3:33 PM  Discharge Date/Time: 2023 12:41 PM  Admitting Diagnosis Code/Description:  Encounter for induction of labor [Z34 90]  Post term pregnancy over 40 weeks [O48 0]  Encounter for full-term uncomplicated delivery [C76]     Mother: Jc Wilson 1995 Estimated Date of Delivery: 23  Delivering clinician: Kimberly Deras    OB History        2    Para   1    Term   1       0    AB   1    Living   1       SAB   0    IAB   0    Ectopic   1    Multiple   0    Live Births   1           Obstetric Comments   Ectopic pregnancy ~2019            Name & MRN:   Information for the patient's :  Josefina Beck [[id number]]     [place] Delivery Information:  Sex: female  Delivered 2023 8:26 AM by , Low Transverse; Gestational Age: 39w6d    [place] Measurements:  Weight: 7 lb 10 9 oz (3485 g); Height: 20\"    APGAR 1 minute 5 minutes 10 minutes   Totals: 8 8      [place] Birth Information: 32 y o  female MRN: [id number] Unit/Bed#: LD OVR   Birthweight: No birth weight on file  Gestational Age: <None> Delivery Type:    APGARS Totals:        UTILIZATION REVIEW CONTACT:  Govind Monaco, Utilization   Network Utilization Review Department  Phone: [phone]  Fax [phone]  Email: [email or web address]  Contact for approvals/pending authorizations, clinical reviews, and discharge     " PHYSICIAN ADVISORY SERVICES:  Medical Necessity Denial & Ilii-jy-Gwke Review  Phone: [phone]  Fax: [phone]  Email: [email or web address]

## 2023-05-18 ENCOUNTER — POSTPARTUM VISIT (OUTPATIENT)
Dept: OBGYN CLINIC | Facility: CLINIC | Age: 28
End: 2023-05-18

## 2023-05-18 VITALS — SYSTOLIC BLOOD PRESSURE: 118 MMHG | DIASTOLIC BLOOD PRESSURE: 74 MMHG | BODY MASS INDEX: 39.43 KG/M2 | WEIGHT: 215.6 LBS

## 2023-05-18 DIAGNOSIS — Z98.891 S/P PRIMARY LOW TRANSVERSE C-SECTION: ICD-10-CM

## 2023-05-18 DIAGNOSIS — Z48.89 ENCOUNTER FOR POST SURGICAL WOUND CHECK: Primary | ICD-10-CM

## 2023-05-18 NOTE — PROGRESS NOTES
Lora Victoria  1995      Assessment:  Diagnoses and all orders for this visit:    Encounter for post surgical wound check    Postpartum depression  -     sertraline (Zoloft) 50 mg tablet; Take 3 tablets (150 mg total) by mouth daily    S/P primary low transverse         Plan:   She will contact her psychiatrist to schedule f/u  [facility] recommended for mental health support and additional resources  Contraception: desires Nexplanon (had good results with this previously - device pool notified to verify coverage piror to next visit  Reviewed wound care  Steri strips removed today  Advised to call with any issues, all concerns & questions addressed  Pap due:     She will return in 3-4 weeks for PP visit and nexplanon insertion  __________________________________________________________________      S:  32 y o  female here for a wound check  She is 1 week s/p 1LTCS  Pregnancy complicated by polyhydramnios, anemia, intellectual disability (fetal alcohol syndrome), major depression, obesity, GBS positive status  Initially admitted for eIOL, however, was offered and accepted CS after she was noted to have meconium stained fluid and persistent category II tracings with late decelerations  Procedure and PP course uncomplicated thus far  Gender: female [facility])   Apgars: 8 and 8  Weight: 7 lbs 11 oz  Her bleeding is very light   She is Bottle feeding without problems  She admits to postpartum blues/depression  Hx of MDD  Prior to pregnancy had been on fluoxetine (d/c due to inconsistency with dosing) lexapro (caused dizziness), and ultimately changed to Wellbutrin  Had good control on Wellbutrin  mg, however, was changed to Zoloft 100 mg QD due to worsening sx mid pregnancy  Current sx include sadness, feeling down, and overwhelmed  Denies anxiety sx  No SI/HI  Feels supported by her partner, aid, and others around her   EPDS 14     Last Pap: 10/24/22 NILM     Review of Systems   Respiratory: Negative  Cardiovascular: Negative  Gastrointestinal: Negative for constipation and diarrhea  Genitourinary: Negative for difficulty urinating, pelvic pain, vaginal bleeding, vaginal discharge, itching or odor  Past Medical History:   Diagnosis Date   • Altered level of consciousness 2014   • Anxiety    • Depression    • Intellectual disability     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   • Personality disorder ([facility] [address] )    • Varicella     Vaccinated     Family History   Adopted: Yes   Problem Relation Age of Onset   • Intellectual disability Sister    • Intellectual disability Brother    • Intellectual disability Brother      Social History     Socioeconomic History   • Marital status: Single     Spouse name: None   • Number of children: None   • Years of education: None   • Highest education level: None   Occupational History   • None   Tobacco Use   • Smoking status: Never   • Smokeless tobacco: Never   Vaping Use   • Vaping Use: Never used   Substance and Sexual Activity   • Alcohol use: Not Currently     Comment: not since confiemed preg   • Drug use: Never   • Sexual activity: Yes     Partners: Male     Birth control/protection: None   Other Topics Concern   • None   Social History Narrative   • None     Social Determinants of Health     Financial Resource Strain: Low Risk    • Difficulty of Paying Living Expenses: Not hard at all   Food Insecurity: Not on file   Transportation Needs: No Transportation Needs   • Lack of Transportation (Medical): No   • Lack of Transportation (Non-Medical): No   Physical Activity: Insufficiently Active   • Days of Exercise per Week: 1 day   • Minutes of Exercise per Session: 60 min   Stress: No Stress Concern Present   • Feeling of Stress :  Only a little   Social Connections: Not on file   Intimate Partner Violence: Not At Risk   • Fear of Current or Ex-Partner: No   • Emotionally Abused: No   • Physically Abused: No   • Sexually Abused: No   Housing Stability: Low Risk    • Unable to Pay for Housing in the Last Year: No   • Number of Places Lived in the Last Year: 1   • Unstable Housing in the Last Year: No       O:   /74 (BP Location: Left arm, Patient Position: Sitting, Cuff Size: Large)   Wt 97 8 kg (215 lb 9 6 oz)   LMP 07/30/2022 (Exact Date)   Breastfeeding No   BMI 39 43 kg/m²     She appears well and is in no distress  Normocephalic, atraumatic  Abdomen is soft and nontender  Steri strips removed  Wound appears well healed w/o signs of infection  Skin is c/d/i  Normal mood, affect, and behavior

## 2023-05-19 ENCOUNTER — TELEPHONE (OUTPATIENT)
Dept: OBGYN CLINIC | Facility: MEDICAL CENTER | Age: 28
End: 2023-05-19

## 2023-05-19 NOTE — TELEPHONE ENCOUNTER
----- Message from Briscoe Mortimer, PA-C sent at 5/18/2023 12:44 PM EDT -----  Regarding: Nexplanon  Please verify coverage of nexplanon for next appt

## 2023-05-24 PROCEDURE — T1002 RN SERVICES UP TO 15 MINUTES: HCPCS

## 2023-06-15 ENCOUNTER — POSTPARTUM VISIT (OUTPATIENT)
Dept: OBGYN CLINIC | Facility: CLINIC | Age: 28
End: 2023-06-15
Payer: MEDICARE

## 2023-06-15 VITALS — DIASTOLIC BLOOD PRESSURE: 64 MMHG | WEIGHT: 206.6 LBS | SYSTOLIC BLOOD PRESSURE: 114 MMHG | BODY MASS INDEX: 37.79 KG/M2

## 2023-06-15 DIAGNOSIS — Z32.02 NEGATIVE PREGNANCY TEST: ICD-10-CM

## 2023-06-15 DIAGNOSIS — Z30.017 INSERTION OF NEXPLANON: ICD-10-CM

## 2023-06-15 LAB — SL AMB POCT URINE HCG: NORMAL

## 2023-06-15 PROCEDURE — 81025 URINE PREGNANCY TEST: CPT | Performed by: PHYSICIAN ASSISTANT

## 2023-06-15 PROCEDURE — 11981 INSERTION DRUG DLVR IMPLANT: CPT | Performed by: PHYSICIAN ASSISTANT

## 2023-06-15 RX ORDER — SERTRALINE HYDROCHLORIDE 100 MG/1
150 TABLET, FILM COATED ORAL DAILY
Qty: 135 TABLET | Refills: 3 | Status: SHIPPED | OUTPATIENT
Start: 2023-06-15 | End: 2024-06-09

## 2023-06-15 RX ORDER — SERTRALINE HYDROCHLORIDE 100 MG/1
TABLET, FILM COATED ORAL
COMMUNITY
Start: 2023-05-18 | End: 2023-06-15

## 2023-06-15 NOTE — PROGRESS NOTES
Patient is here for postpartum visit     delivery 23  Laceration: N/A   incision: healing  Bleeding: stopped  [de-identified] weight 7lbs/11oz  Gender: female- [address]  Breast feed: no  Birth control: Nexplanon 6/15/23  Concerns: depression

## 2023-06-15 NOTE — PROGRESS NOTES
Chang Burroughs  1995      Assessment:  Diagnoses and all orders for this visit:    Postpartum depression  -     Ambulatory referral to Psychiatry; Future    Negative pregnancy test  -     POCT urine HCG    Insertion of Nexplanon  -     etonogestrel (NEXPLANON) subdermal implant 68 mg    Other orders  -     sertraline (ZOLOFT) 100 mg tablet        Plan:  Contraception: Nexplanon inserted today   Reviewed guidelines on resuming sexual activity  CS well healed  [facility] recommended for mental health support and additional resources  Stressed the importance of adequate mental health follow up for both talk therapy as well as medication management  Will increase zoloft from 100 mg to 150 mg daily  She declines changing back to Wellbutrin xl 150 mg, which she was on prior to pregnancy  Feels zoloft has improved sx without increasing s/e  We reviewed increased risk of suicide in persons who have had these thoughts or with plans  She denies these thoughts recently and feels that an increased dose may improve sx severity, as it has for her before  Advised to call with any issues  All concerns & questions addressed  Return in 2-3 weeks for recheck (EPDS 20)  -- encouraged to try to establish with [facility], psych, or f/u with PCP  RTO in 3 months for annual exam      __________________________________________________________________        S:  32 y o  female here for postpartum visit  She is s/p 1LTCS on 5/11/23  Gender: female [facility])   Apgars: 8 and 8  Weight: 7 lbs 11 oz  Her bleeding has stopped   She is Bottle feeding without problems  She admits to postpartum blues/depression  EPDS 20  Feels that depression stems from circumstances surrounding her partner rather than [facility] and being a mother  Reports that she recently had to move out of her home where she lived with her boyfriend/FOB   Reports her  has a children in youth case that is open against him, and that  advised it would not look good to child services if she were living with him  Their daughter is his fourth child and he does not have custody of his other 3 children (2 other mothers)  She reports that lack of custody stems from shortcomings of the mothers - thinks it may have involved substance abuse, but is unsure  She now has heightened stress noting she wants her boyfriend to be a part of Monica's life, and also enjoys the support he provides  Despite this she feels the need to be compliant as she verbalizes she does not want to lose her baby  She also reports frustration and fatigue over constantly having people at her house  As she is assigned aides and caseworkers, she feels like she never has time to herself  She feels it would be rude to ask for privacy and alone time but also dislikes that it times it can feel like people are sitting in her home just staring at her  We discussed polite ways to set boundaries and importance of taking time for herself  She does have friends who support her and help with [facility]  Notably documented she has thought of harming herself sometimes on her EPDS  When questioned on this she denies current thoughts noting this was a prior thought and had no intention or plans to harm herself or others  Review of Systems   Respiratory: Negative  Cardiovascular: Negative  Gastrointestinal: Negative for constipation and diarrhea  Genitourinary: Negative for difficulty urinating, pelvic pain, vaginal bleeding, vaginal discharge, itching or odor        Past Medical History:   Diagnosis Date   • Altered level of consciousness 2014   • Anxiety    • Depression    • Intellectual disability     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   • Personality disorder ([facility] [address] )    • Varicella     Vaccinated     Family History   Adopted: Yes   Problem Relation Age of Onset   • Intellectual disability Sister    • Intellectual disability Brother    • Intellectual disability Brother      Social History     Socioeconomic History   • Marital status: Single     Spouse name: None   • Number of children: None   • Years of education: None   • Highest education level: None   Occupational History   • None   Tobacco Use   • Smoking status: Never   • Smokeless tobacco: Never   Vaping Use   • Vaping Use: Never used   Substance and Sexual Activity   • Alcohol use: Not Currently     Comment: not since confiemed preg   • Drug use: Never   • Sexual activity: Not Currently     Partners: Male     Birth control/protection: None, Implant     Comment: Nexplanon 6/15/23   Other Topics Concern   • None   Social History Narrative   • None     Social Determinants of Health     Financial Resource Strain: Low Risk  (9/1/2022)    Overall Financial Resource Strain (CARDIA)    • Difficulty of Paying Living Expenses: Not hard at all   Food Insecurity: No Food Insecurity (3/17/2022)    Hunger Vital Sign    • Worried About Running Out of Food in the Last Year: Never true    • Ran Out of Food in the Last Year: Never true   Transportation Needs: No Transportation Needs (9/1/2022)    PRAPARE - Transportation    • Lack of Transportation (Medical): No    • Lack of Transportation (Non-Medical): No   Physical Activity: Insufficiently Active (9/1/2022)    Exercise Vital Sign    • Days of Exercise per Week: 1 day    • Minutes of Exercise per Session: 60 min   Stress: No Stress Concern Present (9/1/2022)    [address]    • Feeling of Stress :  Only a little   Social Connections: Not on file   Intimate Partner Violence: Not At Risk (9/1/2022)    Humiliation, Afraid, Rape, and Kick questionnaire    • Fear of Current or Ex-Partner: No    • Emotionally Abused: No    • Physically Abused: No    • Sexually Abused: No   Housing Stability: Low Risk  (9/1/2022)    Housing Stability Vital Sign    • Unable to Pay for Housing in the Last Year: No    • "Number of Places Lived in the Last Year: 1    • Unstable Housing in the Last Year: No       O:   /64 (BP Location: Right arm, Patient Position: Sitting, Cuff Size: Standard)   Wt 93 7 kg (206 lb 9 6 oz)   LMP 07/30/2022 (Exact Date)   Breastfeeding No   BMI 37 79 kg/m²     She appears well and is in no distress  Normocephalic, atraumatic  Normal respiratory effort   Abdomen is soft and nontender  No focal neurological deficits  Normal mood, affect, and behavior  Universal Protocol:  Consent: Verbal consent obtained  Risks and benefits: risks, benefits and alternatives were discussed  Consent given by: patient  Time out: Immediately prior to procedure a \"time out\" was called to verify the correct patient, procedure, equipment, support staff and site/side marked as required  Patient understanding: patient states understanding of the procedure being performed  Patient consent: the patient's understanding of the procedure matches consent given  Procedure consent: procedure consent matches procedure scheduled  Relevant documents: relevant documents present and verified  Test results: test results available and properly labeled  Site marked: the operative site was marked  Required items: required blood products, implants, devices, and special equipment available  Patient identity confirmed: verbally with patient    Remove and insert drug implant    Date/Time: 6/15/2023 10:40 AM    Performed by: Rashi Sanford PA-C  Authorized by: Rashi Sanford PA-C    Indication:     Indication: Insertion of non-biodegradable drug delivery implant    Pre-procedure:     Pre-procedure timeout performed: yes      Local anesthetic:  Lidocaine with epinephrine    The site was cleaned and prepped in a sterile fashion: yes    Procedure:     Procedure:   Insertion    Left/right:  Left    Preloaded contraceptive capsule trocar was placed subdermally: yes      Contraceptive capsule was inserted and trocar " removed: yes      Visualization of notch in stylet and palpation of device: yes      Palpation confirms placement by provider and patient: yes      Site was closed with steri-strips and pressure bandage applied: yes

## 2023-06-28 ENCOUNTER — TELEPHONE (OUTPATIENT)
Dept: PSYCHIATRY | Facility: CLINIC | Age: 28
End: 2023-06-28

## 2023-06-28 NOTE — TELEPHONE ENCOUNTER
Called and LVM to make LORNA with new residents from [place] [address]  office to [facility]

## 2023-07-20 ENCOUNTER — TELEPHONE (OUTPATIENT)
Dept: OBGYN CLINIC | Facility: CLINIC | Age: 28
End: 2023-07-20

## 2023-07-20 NOTE — TELEPHONE ENCOUNTER
Patient had a baby on May 11th and had Nexplanon placed 6/15 and was intimate 6/17 and has been intimate since then. She has slight nausea but occasionally get's nauseous according to .  is concerned about the patient possibly being pregnant again since there wasn't much of a window between placement of the Nexplanon and being intimate.  was requesting either an appointment to do a urine pregnancy test or blood work but would prefer blood work. Please advise.

## 2023-08-01 DIAGNOSIS — G43.001 MIGRAINE WITHOUT AURA AND WITH STATUS MIGRAINOSUS, NOT INTRACTABLE: ICD-10-CM

## 2023-08-01 RX ORDER — SUMATRIPTAN 25 MG/1
50 TABLET, FILM COATED ORAL ONCE AS NEEDED
Qty: 60 TABLET | Refills: 2 | Status: SHIPPED | OUTPATIENT
Start: 2023-08-01

## 2023-08-15 ENCOUNTER — TELEPHONE (OUTPATIENT)
Dept: PSYCHIATRY | Facility: CLINIC | Age: 28
End: 2023-08-15

## 2023-08-15 NOTE — TELEPHONE ENCOUNTER
Behavioral Health Outpatient Intake Questions    Referred By   : LORNA from (obygyn)    Please advise interviewee that they need to answer all questions truthfully to allow for best care, and any misrepresentations of information may affect their ability to be seen at this clinic   => Was this discussed? Yes     If Minor Child (under age 25)    Who is/are the legal guardian(s) of the child? Is there a custody agreement? No     • If "YES"- Custody orders must be obtained prior to scheduling the first appointment  • In addition, Consent to Treatment must be signed by all legal guardians prior to scheduling the first appointment    • If "NO"- Consent to Treatment must be signed by all legal guardians prior to scheduling the first appointment      [address] History -     Presenting Problem (in patient's own words): Serve depressive epsoides, Post pardom, intellectual Disabilities, personality disorder. Are there any communication barriers for this patient? No                                               If yes, please describe barriers: intelluctual Disabilities  • If there is a unique situation, please refer to [address] for final determination. Are you taking any psychiatric medications? Yes   •   If "YES" -What are they Zoloft 150  •   If "YES" -Who prescribes? Has the Patient previously received outpatient Talk Therapy or Medication Management from [facility]  yes  •    If "YES"- When, Where and with Whom?  and OP      •    If "NO" -Has Patient received these services elsewhere? •   If "YES" -When, Where, and with Whom? Has the Patient abused alcohol or other substances in the last 6 months ? No  No concerns of substance abuse are reported. •  If "YES" -What substance, How much, How often? •  If illegal substance: Refer to [employer] (for KELLY) or [employer].   •  If Alcohol in excess of 10 drinks per week: Refer to [facility] (for KELLY) or [facility]    Legal History-     Is this treatment court ordered? No   If "yes "send to :  • Talk Therapy : Send to [address] for final determination   • Med Management: Send to Dr Yamila Gauthier for final determination     Has the Patient been convicted of a felony? No   If "Yes" send to -When, What? • Talk Therapy : Send to [address] for final determination   • Med Management: Send to Dr Yamila Gauthier for final determination     ACCEPTED as a patient Yes  • If "Yes" Appointment Date: 9/6/ at 2pm with     Referred Elsewhere? No  • If “Yes” - (Where? Ex: [facility], [facility], [address] [place] [address], etc.)       Name of Insurance Co:Jennifer [address] [place] [address]  Insurance DI#[id number]   Insurance Phone #[phone]     If ins is primary or secondary? primary  If patient is a minor, parents information such as Name, D. O.B of guarantor.

## 2023-08-31 ENCOUNTER — PATIENT OUTREACH (OUTPATIENT)
Dept: FAMILY MEDICINE CLINIC | Facility: CLINIC | Age: 28
End: 2023-08-31

## 2023-08-31 ENCOUNTER — OFFICE VISIT (OUTPATIENT)
Dept: FAMILY MEDICINE CLINIC | Facility: CLINIC | Age: 28
End: 2023-08-31

## 2023-08-31 VITALS
OXYGEN SATURATION: 98 % | TEMPERATURE: 98.3 F | WEIGHT: 212.4 LBS | HEART RATE: 78 BPM | BODY MASS INDEX: 39.08 KG/M2 | SYSTOLIC BLOOD PRESSURE: 128 MMHG | DIASTOLIC BLOOD PRESSURE: 83 MMHG | HEIGHT: 62 IN | RESPIRATION RATE: 16 BRPM

## 2023-08-31 DIAGNOSIS — N91.2 AMENORRHEA: ICD-10-CM

## 2023-08-31 DIAGNOSIS — Z98.891 STATUS POST PRIMARY LOW TRANSVERSE CESAREAN SECTION: ICD-10-CM

## 2023-08-31 DIAGNOSIS — K59.09 OTHER CONSTIPATION: ICD-10-CM

## 2023-08-31 DIAGNOSIS — G43.009 MIGRAINE WITHOUT AURA AND WITHOUT STATUS MIGRAINOSUS, NOT INTRACTABLE: Primary | ICD-10-CM

## 2023-08-31 PROCEDURE — 99214 OFFICE O/P EST MOD 30 MIN: CPT | Performed by: FAMILY MEDICINE

## 2023-08-31 RX ORDER — POLYETHYLENE GLYCOL 3350 17 G/17G
17 POWDER, FOR SOLUTION ORAL DAILY PRN
Qty: 14 EACH | Refills: 1 | Status: SHIPPED | OUTPATIENT
Start: 2023-08-31

## 2023-08-31 RX ORDER — ONDANSETRON 4 MG/1
4 TABLET, ORALLY DISINTEGRATING ORAL EVERY 6 HOURS PRN
Qty: 20 TABLET | Refills: 0 | Status: SHIPPED | OUTPATIENT
Start: 2023-08-31

## 2023-08-31 RX ORDER — IBUPROFEN 600 MG/1
600 TABLET ORAL EVERY 6 HOURS PRN
Qty: 30 TABLET | Refills: 0 | Status: SHIPPED | OUTPATIENT
Start: 2023-08-31

## 2023-08-31 NOTE — ASSESSMENT & PLAN NOTE
Caregiver concerned patient may be pregnant   Patient is on Nexplanon for contraception  POC UPT- Negative  Patient and caregiver reassured

## 2023-08-31 NOTE — PROGRESS NOTES
Name: Batool Valle      : 1995      MRN: [id number]  Encounter Provider: Pearline Claude, MD  Encounter Date: 2023   Encounter department: [address]     1. Migraine without aura and without status migrainosus, not intractable  Assessment & Plan:  Acute flare of migraine  Moderately improved after taking Imitrex  WE discussed use of Tylenol/NSAIDs  Continue to stay hydrated  Continue current regimen. Orders:  -     ondansetron (ZOFRAN-ODT) 4 mg disintegrating tablet; Take 1 tablet (4 mg total) by mouth every 6 (six) hours as needed for nausea or vomiting    2. Status post primary low transverse  section  -     ibuprofen (MOTRIN) 600 mg tablet; Take 1 tablet (600 mg total) by mouth every 6 (six) hours as needed for mild pain    3. Other constipation  -     polyethylene glycol (MIRALAX) 17 g packet; Take 17 g by mouth daily as needed (constipation)    4. Amenorrhea  Assessment & Plan:  Caregiver concerned patient may be pregnant   Patient is on Nexplanon for contraception  POC UPT- Negative  Patient and caregiver reassured           Subjective     Migraine  This is a chronic problem. The current episode started today. The problem occurs constantly. The problem has been gradually improving since onset. The pain is present in the left unilateral. The pain radiates to the upper back. The pain quality is similar to prior headaches. The quality of the pain is described as throbbing. The pain is at a severity of 4/10. The pain is mild. Pertinent negatives include no diarrhea, dizziness, ear pain, eye pain or vomiting. Treatments tried: Imitrex. The treatment provided moderate relief. Caregiver also concerned about the possibility of pregnancy. Patient has Nexplanon inserted 6/15/23. Patient reports being sexually active. Patient has not had periods after delivery. Review of Systems   HENT: Negative for ear pain.     Eyes: Negative for pain. Gastrointestinal: Negative for diarrhea and vomiting. Neurological: Negative for dizziness. 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   • Personality disorder ([address])    • Varicella     Vaccinated     Past Surgical History:   Procedure Laterality Date   • NO PAST SURGERIES     • MT  DELIVERY ONLY N/A 2023    Procedure:  SECTION (); Surgeon: Milton Kincaid MD;  Location: [facility] ;  Service: Obstetrics     Family History   Adopted: Yes   Problem Relation Age of Onset   • Intellectual disability Sister    • Intellectual disability Brother    • Intellectual disability Brother      Social History     Socioeconomic History   • Marital status: Single     Spouse name: None   • Number of children: None   • Years of education: None   • Highest education level: None   Occupational History   • None   Tobacco Use   • Smoking status: Never   • Smokeless tobacco: Never   Vaping Use   • Vaping Use: Never used   Substance and Sexual Activity   • Alcohol use: Not Currently     Comment: not since confiemed preg   • Drug use: Never   • Sexual activity: Not Currently     Partners: Male     Birth control/protection: None, Implant     Comment: Nexplanon 6/15/23   Other Topics Concern   • None   Social History Narrative   • None     Social Determinants of Health     Financial Resource Strain: Low Risk  (2022)    Overall Financial Resource Strain (CARDIA)    • Difficulty of Paying Living Expenses: Not hard at all   Food Insecurity: No Food Insecurity (3/17/2022)    Hunger Vital Sign    • Worried About Running Out of Food in the Last Year: Never true    • Ran Out of Food in the Last Year: Never true   Transportation Needs: No Transportation Needs (2022)    PRAPARE - Transportation    • Lack of Transportation (Medical):  No    • Lack of Transportation (Non-Medical): No   Physical Activity: Insufficiently Active (9/1/2022)    Exercise Vital Sign    • Days of Exercise per Week: 1 day    • Minutes of Exercise per Session: 60 min   Stress: No Stress Concern Present (9/1/2022)    [facility]    • Feeling of Stress :  Only a little   Social Connections: Not on file   Intimate Partner Violence: Not At Risk (9/1/2022)    Humiliation, Afraid, Rape, and Kick questionnaire    • Fear of Current or Ex-Partner: No    • Emotionally Abused: No    • Physically Abused: No    • Sexually Abused: No   Housing Stability: Low Risk  (9/1/2022)    Housing Stability Vital Sign    • Unable to Pay for Housing in the Last Year: No    • Number of [address] in the Last Year: 1    • Unstable Housing in the Last Year: No     Current Outpatient Medications on File Prior to Visit   Medication Sig   • acetaminophen (TYLENOL) 325 mg tablet Take 2 tablets (650 mg total) by mouth every 6 (six) hours as needed for mild pain for up to 8 doses   • sertraline (ZOLOFT) 100 mg tablet Take 1.5 tablets (150 mg total) by mouth daily   • SUMAtriptan (IMITREX) 25 mg tablet Take 2 tablets (50 mg total) by mouth once as needed for migraine for up to 1 dose   • FERROUS SULFATE PO Take by mouth every other day (Patient not taking: Reported on 8/31/2023)   • prenatal vitamin (CLASSIC FORMULA) 27-0.8 mg Take 1 tablet by mouth daily at bedtime (Patient not taking: Reported on 8/31/2023)   • [DISCONTINUED] docusate sodium (COLACE) 100 mg capsule Take 1 capsule (100 mg total) by mouth 2 (two) times a day as needed for constipation (Patient not taking: Reported on 5/18/2023)   • [DISCONTINUED] famotidine (PEPCID) 20 mg tablet Take 1 tablet (20 mg total) by mouth 2 (two) times a day (Patient not taking: Reported on 8/31/2023)   • [DISCONTINUED] ibuprofen (MOTRIN) 600 mg tablet Take 1 tablet (600 mg total) by mouth every 6 (six) hours as needed for mild pain (Patient not taking: Reported on 8/31/2023)   • [DISCONTINUED] ondansetron (ZOFRAN-ODT) 4 mg disintegrating tablet Take 1 tablet (4 mg total) by mouth every 6 (six) hours as needed for nausea or vomiting (Patient not taking: Reported on 8/31/2023)     No Known Allergies  Immunization History   Administered Date(s) Administered   • BCG 1995   • DTP 1995, 01/31/1994, 03/21/1994, 11/13/1995   • DTaP 08/09/1999   • HPV Quadrivalent 08/22/2016, 02/28/2017   • HPV9 10/24/2016   • Hep B, Adolescent or Pediatric 07/06/1999   • Hep B, adult 11/24/2020, 01/26/2021   • INFLUENZA 10/14/1999, 11/15/1999   • Influenza Quadrivalent, 6-35 Months IM 10/24/2016   • Influenza, injectable, quadrivalent, preservative free 0.5 mL 10/09/2018, 09/22/2020, 11/23/2022   • Influenza, recombinant, quadrivalent,injectable, preservative free 10/28/2021   • Influenza, seasonal, injectable 12/08/2014   • MMR 07/06/1999, 08/09/1999   • Measles 08/23/1994   • OPV 1995, 01/31/1994, 03/21/1994, 11/22/1994, 01/30/1995, 09/29/1995, 11/13/1995   • Rabies-IM Human Diploid Cell Culture 12/10/2020   • Tdap 11/30/2021   • Varicella 07/06/1999       Objective     /83   Pulse 78   Temp 98.3 °F (36.8 °C)   Resp 16   Ht 5' 2" (1.575 m)   Wt 96.3 kg (212 lb 6.4 oz)   SpO2 98%   BMI 38.85 kg/m²     Physical Exam  Constitutional:       Appearance: She is obese. HENT:      Head: Normocephalic and atraumatic. Eyes:      Extraocular Movements: Extraocular movements intact. Cardiovascular:      Rate and Rhythm: Normal rate. Pulses: Normal pulses. Pulmonary:      Effort: Pulmonary effort is normal. No respiratory distress. Breath sounds: No wheezing or rales. Musculoskeletal:      Cervical back: Neck supple. Right lower leg: No edema. Left lower leg: No edema. Skin:     General: Skin is warm and dry. Neurological:      Mental Status: Mental status is at baseline.    Psychiatric: Mood and Affect: Mood normal.       Ning Berumen MD

## 2023-08-31 NOTE — ASSESSMENT & PLAN NOTE
Acute flare of migraine  Moderately improved after taking Imitrex  WE discussed use of Tylenol/NSAIDs  Continue to stay hydrated  Continue current regimen.

## 2023-08-31 NOTE — PROGRESS NOTES
Outpatient RN Care Manager Note    RN HOMAR was requested by [facility] to obtain patient signatures for [employer] application. JERED GRAF met with the patient and signatures obtained. Forms returned to [facility] desk.

## 2023-09-05 ENCOUNTER — PATIENT OUTREACH (OUTPATIENT)
Dept: FAMILY MEDICINE CLINIC | Facility: CLINIC | Age: 28
End: 2023-09-05

## 2023-09-05 DIAGNOSIS — Z74.8 ASSISTANCE NEEDED WITH TRANSPORTATION: Primary | ICD-10-CM

## 2023-09-05 NOTE — PROGRESS NOTES
Spoke with [facility] in person. Pt's child is needing [employer] application completed. Pt also requiring [employer] application. OP [facility] will place [facility] referral for transportation assistance.

## 2023-09-06 ENCOUNTER — PATIENT OUTREACH (OUTPATIENT)
Dept: FAMILY MEDICINE CLINIC | Facility: CLINIC | Age: 28
End: 2023-09-06

## 2023-09-06 ENCOUNTER — OFFICE VISIT (OUTPATIENT)
Dept: PSYCHIATRY | Facility: CLINIC | Age: 28
End: 2023-09-06
Payer: COMMERCIAL

## 2023-09-06 VITALS — BODY MASS INDEX: 38.78 KG/M2 | WEIGHT: 212 LBS

## 2023-09-06 DIAGNOSIS — F41.9 ANXIETY DISORDER, UNSPECIFIED TYPE: ICD-10-CM

## 2023-09-06 DIAGNOSIS — F43.10 PTSD (POST-TRAUMATIC STRESS DISORDER): ICD-10-CM

## 2023-09-06 DIAGNOSIS — F33.9 MAJOR DEPRESSION, RECURRENT, CHRONIC (HCC): ICD-10-CM

## 2023-09-06 PROCEDURE — 90792 PSYCH DIAG EVAL W/MED SRVCS: CPT

## 2023-09-06 RX ORDER — SERTRALINE HYDROCHLORIDE 100 MG/1
150 TABLET, FILM COATED ORAL DAILY
Qty: 45 TABLET | Refills: 2 | Status: SHIPPED | OUTPATIENT
Start: 2023-09-06 | End: 2024-08-31

## 2023-09-06 RX ORDER — PRAZOSIN HYDROCHLORIDE 1 MG/1
1 CAPSULE ORAL
Qty: 30 CAPSULE | Refills: 2 | Status: SHIPPED | OUTPATIENT
Start: 2023-09-06

## 2023-09-06 NOTE — PROGRESS NOTES
received referral from [employer] to assist patient with [facility] Tay. [facility] [place] [facility] will meet with patient and her  Jodi Lee today in the office at 4:pm.     [address] [place] [address] 165 completed the Erika Gardner Application. Need signatures from patient today.

## 2023-09-06 NOTE — PATIENT INSTRUCTIONS
Please present for your previously scheduled appointment approximately 15 minutes prior to appointment time. If you are running late or are unable to attend your appointment, please call our [place] office at [phone] or our [place] ([place]) office at [phone] if applicable to notify the office of your absence. If you are in psychological crisis including not limited to suicidal/homicidal thoughts or thoughts of self-injury, do not hesitate to call/contact your [facility] hotline (see below) or attend to the nearest emergency department.   [facility] Crisis: [address] Crisis: [phone]  [address] Crisis: [address] Crisis: [address] Crisis: [address] Crisis: [address] Crisis: [phone]  National Suicide Prevention Hotline: 3-191.172.4960

## 2023-09-06 NOTE — BH TREATMENT PLAN
TREATMENT PLAN (Medication Management Only)        [facility]    Name and Date of Birth:  Anjana Rivera 25 y.o. 1995  Date of Treatment Plan: September 6, 2023  Diagnosis/Diagnoses:    1. Postpartum depression    2. PTSD (post-traumatic stress disorder)    3. Anxiety disorder, unspecified type    4. Major depression, recurrent, chronic (HCC)      Strengths/Personal Resources for Self-Care: supportive friends, taking medications as prescribed, ability to adapt to life changes, ability to listen, ability to reason, good physical health, motivation for treatment, ability to negotiate basic needs. Area/Areas of need (in own words): Symptoms of depression  1. Long Term Goal: Continue to improve symptoms of depression and anxiety. Continue to address PTSD symptoms. Target Date:6 months - 3/6/2024  Person/Persons responsible for completion of goal: Dr. Christopher Jorgensen  2. Short Term Objective (s) - How will we reach this goal?:   Provider new recommended medication/dosage changes and/or continue medication(s): Continue ongoing psychiatric medication management  Continue Zoloft at 150 mg daily and start prazosin 1 mg at bedtime  1. Take medications as prescribed  2. Attend psychiatry appointments regularly  3. Referral for psychotherapy  4. Practice coping skills  5. Avoid alcohol   6. Avoid drugs   7. Eat a healthy diet   8. Take walks regularly  9. Try relaxation techniques  Target Date:3 months - 12/6/2023  Person/Persons Responsible for Completion of Goal: Sherry  Progress Towards Goals: starting treatment  Treatment Modality: Medication management every 1 to 3 weeks  Review due 180 days from date of this plan: 6 months - 3/6/2024  Expected length of service: ongoing treatment  My Physician/PA/NP and I have developed this plan together and I agree to work on the goals and objectives.  I understand the treatment goals that were developed for my treatment.
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
## 2023-09-07 ENCOUNTER — PATIENT OUTREACH (OUTPATIENT)
Dept: FAMILY MEDICINE CLINIC | Facility: CLINIC | Age: 28
End: 2023-09-07

## 2023-09-07 NOTE — PROGRESS NOTES
spoke to [employer]. Patient is interested in [facility]. Pt needs to provide a copy of her ID. Pt does not have ID and only has an  passport. I will contact [employer] transportation to find out if this is accepted. Gabi Rodríguez stated pt may be able to stop by the office on Monday and f/u with JERED Harris. RN is aware and has [employer] application.

## 2023-09-14 ENCOUNTER — PATIENT OUTREACH (OUTPATIENT)
Dept: FAMILY MEDICINE CLINIC | Facility: CLINIC | Age: 28
End: 2023-09-14

## 2023-09-14 NOTE — PROGRESS NOTES
[facility]) spoke to [facility] and she stated she may be able to come to the office tomorrow and bring patient. Juan Martinez will let me know later this afternoon.

## 2023-09-22 ENCOUNTER — PATIENT OUTREACH (OUTPATIENT)
Dept: FAMILY MEDICINE CLINIC | Facility: CLINIC | Age: 28
End: 2023-09-22

## 2023-09-22 ENCOUNTER — OFFICE VISIT (OUTPATIENT)
Dept: FAMILY MEDICINE CLINIC | Facility: CLINIC | Age: 28
End: 2023-09-22

## 2023-09-22 VITALS
SYSTOLIC BLOOD PRESSURE: 128 MMHG | OXYGEN SATURATION: 97 % | HEART RATE: 93 BPM | TEMPERATURE: 98.7 F | DIASTOLIC BLOOD PRESSURE: 88 MMHG

## 2023-09-22 DIAGNOSIS — R05.1 ACUTE COUGH: Primary | ICD-10-CM

## 2023-09-22 LAB
SARS-COV-2 AG UPPER RESP QL IA: NEGATIVE
VALID CONTROL: NORMAL

## 2023-09-22 PROCEDURE — 87811 SARS-COV-2 COVID19 W/OPTIC: CPT | Performed by: FAMILY MEDICINE

## 2023-09-22 PROCEDURE — 99213 OFFICE O/P EST LOW 20 MIN: CPT | Performed by: FAMILY MEDICINE

## 2023-09-22 RX ORDER — GUAIFENESIN 600 MG/1
1200 TABLET, EXTENDED RELEASE ORAL EVERY 12 HOURS SCHEDULED
Qty: 12 TABLET | Refills: 0 | Status: SHIPPED | OUTPATIENT
Start: 2023-09-22 | End: 2023-09-25

## 2023-09-22 NOTE — PROGRESS NOTES
Name: Lore Arguello      : 1995      MRN: [id number]  Encounter Provider: Halley Potter MD  Encounter Date: 2023   Encounter department: [address]     1. Acute cough  Assessment & Plan:  Patient with cough, rhinorrhea, and sore throat since this weekend. Denies any fever, chills, nausea or vomiting. On exam patient has no LAD, no exudates on tonsils noted mild erythema and postnasal drip noted in pharynx. Will give patient Mucinex. Lungs clear on exam today. Likely viral illness. POC COVID test negative. Orders:  -     POCT Rapid Covid Ag  -     guaiFENesin (MUCINEX) 600 mg 12 hr tablet; Take 2 tablets (1,200 mg total) by mouth every 12 (twelve) hours for 3 days         Subjective      Here for cough and congestion that started Saturday. Patient also reporting sore throat and rhinorrhea. Patient denies any fevers, chills nausea or vomiting. No known sick contacts. Review of Systems   Constitutional: Negative for chills and fever. HENT: Positive for congestion, postnasal drip, rhinorrhea and sore throat. Negative for ear pain. Eyes: Negative for pain and visual disturbance. Respiratory: Positive for cough. Negative for shortness of breath. Cardiovascular: Negative for chest pain and palpitations. Gastrointestinal: Negative for abdominal pain and vomiting. Genitourinary: Negative for dysuria and hematuria. Musculoskeletal: Negative for arthralgias and back pain. Skin: Negative for color change and rash. Neurological: Negative for seizures and syncope. All other systems reviewed and are negative.       Current Outpatient Medications on File Prior to Visit   Medication Sig   • acetaminophen (TYLENOL) 325 mg tablet Take 2 tablets (650 mg total) by mouth every 6 (six) hours as needed for mild pain for up to 8 doses   • FERROUS SULFATE PO Take by mouth every other day (Patient not taking: Reported on 9/6/2023)   • ibuprofen (MOTRIN) 600 mg tablet Take 1 tablet (600 mg total) by mouth every 6 (six) hours as needed for mild pain   • ondansetron (ZOFRAN-ODT) 4 mg disintegrating tablet Take 1 tablet (4 mg total) by mouth every 6 (six) hours as needed for nausea or vomiting (Patient not taking: Reported on 9/6/2023)   • polyethylene glycol (MIRALAX) 17 g packet Take 17 g by mouth daily as needed (constipation)   • prazosin (MINIPRESS) 1 mg capsule Take 1 capsule (1 mg total) by mouth daily at bedtime   • prenatal vitamin (CLASSIC FORMULA) 27-0.8 mg Take 1 tablet by mouth daily at bedtime (Patient not taking: Reported on 8/31/2023)   • sertraline (ZOLOFT) 100 mg tablet Take 1.5 tablets (150 mg total) by mouth daily   • SUMAtriptan (IMITREX) 25 mg tablet Take 2 tablets (50 mg total) by mouth once as needed for migraine for up to 1 dose       Objective     /88 (BP Location: Left arm, Patient Position: Sitting, Cuff Size: Standard)   Pulse 93   Temp 98.7 °F (37.1 °C) (Temporal)   SpO2 97%     Physical Exam  Constitutional:       General: She is awake. Appearance: Normal appearance. HENT:      Head: Normocephalic. Jaw: There is normal jaw occlusion. Nose: Congestion and rhinorrhea present. Mouth/Throat:      Pharynx: Posterior oropharyngeal erythema present. No oropharyngeal exudate. Eyes:      General: Lids are normal.   Cardiovascular:      Rate and Rhythm: Normal rate and regular rhythm. Heart sounds: Normal heart sounds, S1 normal and S2 normal.   Pulmonary:      Effort: Pulmonary effort is normal. No tachypnea or bradypnea. Breath sounds: Normal breath sounds and air entry. Abdominal:      General: Bowel sounds are normal.      Palpations: Abdomen is soft. Tenderness: There is no abdominal tenderness. Skin:     General: Skin is warm. Neurological:      Mental Status: She is alert and oriented to person, place, and time.    Psychiatric:         Attention and Perception: Attention normal.         Behavior: Behavior is cooperative.        Amberly Wilkes MD

## 2023-09-22 NOTE — PROGRESS NOTES
DEVON received a call from the front office that pt and her child have appts today and have no transportation to appt. Pt has transportation through [employer] but her child does not so they will not take them both and both are sick. [facility] attempted to reach pt and was unable to reach her, a message was left to please return [facility] call. DEVON then contacted pts , Akhil Pagan, who advised [facility] starting in October pt and her daughter were approved for lyft /uber rides through insurance. This will be the last rides they need to request through our office. FLEX will contact [employer] and arrange ride. [facility] will remain available. Ride  arranged for 1:30 pm today.

## 2023-09-22 NOTE — ASSESSMENT & PLAN NOTE
Patient with cough, rhinorrhea, and sore throat since this weekend. Denies any fever, chills, nausea or vomiting. On exam patient has no LAD, no exudates on tonsils noted mild erythema and postnasal drip noted in pharynx. Will give patient Mucinex. Lungs clear on exam today. Likely viral illness. POC COVID test negative.

## 2023-09-29 ENCOUNTER — PATIENT OUTREACH (OUTPATIENT)
Dept: FAMILY MEDICINE CLINIC | Facility: CLINIC | Age: 28
End: 2023-09-29

## 2023-09-29 NOTE — PROGRESS NOTES
Pt required transportation for appt on 9/22. Pt's  informed [facility] and [facility] covering pt and pt's dtr that they were approved by their insurance to receive lyft/uber transportation. Per conversation with [facility], pt does not want [employer] [place] despite recommended for it. [facility] to close case. [facility] to close case, as well.

## 2023-10-09 ENCOUNTER — OFFICE VISIT (OUTPATIENT)
Dept: PSYCHIATRY | Facility: CLINIC | Age: 28
End: 2023-10-09
Payer: COMMERCIAL

## 2023-10-09 DIAGNOSIS — F43.10 PTSD (POST-TRAUMATIC STRESS DISORDER): ICD-10-CM

## 2023-10-09 DIAGNOSIS — F33.9 MAJOR DEPRESSION, RECURRENT, CHRONIC (HCC): Primary | ICD-10-CM

## 2023-10-09 DIAGNOSIS — F41.9 ANXIETY DISORDER, UNSPECIFIED TYPE: ICD-10-CM

## 2023-10-09 PROCEDURE — 99214 OFFICE O/P EST MOD 30 MIN: CPT

## 2023-10-09 RX ORDER — SERTRALINE HYDROCHLORIDE 100 MG/1
200 TABLET, FILM COATED ORAL DAILY
Qty: 60 TABLET | Refills: 2 | Status: SHIPPED | OUTPATIENT
Start: 2023-10-09 | End: 2024-10-03

## 2023-10-09 NOTE — PATIENT INSTRUCTIONS
Please present for your previously scheduled appointment approximately 15 minutes prior to appointment time. If you are running late or are unable to attend your appointment, please call our [place] office at [phone] or our [place] ([place] office at [phone] if applicable to notify the office of your absence. If you are in psychological crisis including not limited to suicidal/homicidal thoughts or thoughts of self-injury, do not hesitate to call/contact your [facility] hotline (see below) or attend to the nearest emergency department.   [facility] Crisis: [address] Crisis: [phone]  [address] Crisis: [address] Crisis: [address] Crisis: [address] Crisis: [address] Crisis: [phone]  National Suicide Prevention Hotline: 6-410.185.9465

## 2023-10-09 NOTE — PSYCH
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 Factors include: none  Protective Factors: no current homicidal ideation, stable mood, compliant with medications, compliant with treatment, willing to continue psychiatric treatment, willing to remain free from substance use, supportive friends, being a parent, access to mental health treatment  Weapons/Firearms: none. The following steps have been taken to ensure weapons are properly secured: not applicable  Based on today's Tobias Plummer presents the following risk of harm to others: Minimal     Although patient's acute lethality risk is low, long-term/chronic lethality risk is mildly elevated in the presence of minimal symptoms of depression and moderate symptoms of anxiety. At the current moment, Janell Dennis is future-oriented, forward-thinking, and demonstrates ability to act in a self-preserving manner as evidenced by volitionally presenting to the clinic today, seeking treatment. At this juncture, inpatient hospitalization is not currently warranted. To mitigate future risk, patient should adhere to the recommendations of this writer, avoid alcohol/illicit substance use, utilize community-based resources and familiar support and prioritize mental health treatment. Based on today's assessment and clinical criteria, Pato Gordillo contracts for safety and is not an imminent risk of harm to self or others. Outpatient level of care is deemed appropriate at this present time.  Janell Dennis understands that if they are no longer able to contract for safety, they need to call/contact the outpatient office including this writer, call/contact crisis and/orattend to the nearest Emergency Department for immediate evaluation. Risks/Benefits      Risks, Benefits And Possible Side Effects Of Medications:    Risks, benefits, and possible side effects of medications explained to [facility] and she verbalizes understanding and agreement for treatment. Controlled Medication Discussion:     Not applicable - controlled prescriptions are not prescribed by this practice    Psychotherapy Provided:     Individual psychotherapy provided: Yes  Recent stressor including relationship stressors as well as childcare stressors discussed with [facility]. Cognitive therapy was utilized during the session. Treatment Plan:    Completed and signed during the session: Not applicable - Treatment Plan not due at this session    Visit Time    Visit Start Time: 2:00 PM  Visit Stop Time: 2:45 PM  Total Visit Duration: 45 minutes    This note was shared with patient. Pia Sagastume MD 10/09/23    This note has been constructed using a voice recognition system. There may be translation, syntax, or grammatical errors. If you have any questions, please contact the dictating provider.

## 2023-10-16 ENCOUNTER — PATIENT OUTREACH (OUTPATIENT)
Dept: FAMILY MEDICINE CLINIC | Facility: CLINIC | Age: 28
End: 2023-10-16

## 2023-10-16 NOTE — PROGRESS NOTES
Patient does not want [employer]. Pt will be getting transportation and it will covered by the [facility]. Per . [facility] will be closing the case.

## 2023-11-16 ENCOUNTER — OFFICE VISIT (OUTPATIENT)
Dept: FAMILY MEDICINE CLINIC | Facility: CLINIC | Age: 28
End: 2023-11-16
Payer: MEDICARE

## 2023-11-16 VITALS
RESPIRATION RATE: 16 BRPM | TEMPERATURE: 99.3 F | DIASTOLIC BLOOD PRESSURE: 72 MMHG | HEIGHT: 63 IN | SYSTOLIC BLOOD PRESSURE: 118 MMHG | BODY MASS INDEX: 38.27 KG/M2 | WEIGHT: 216 LBS | HEART RATE: 102 BPM | OXYGEN SATURATION: 98 %

## 2023-11-16 DIAGNOSIS — R05.3 CHRONIC COUGH: Primary | ICD-10-CM

## 2023-11-16 DIAGNOSIS — F41.9 ANXIETY AND DEPRESSION: ICD-10-CM

## 2023-11-16 DIAGNOSIS — F70 MILD INTELLECTUAL DISABILITY: ICD-10-CM

## 2023-11-16 DIAGNOSIS — F32.A ANXIETY AND DEPRESSION: ICD-10-CM

## 2023-11-16 DIAGNOSIS — Z23 ENCOUNTER FOR IMMUNIZATION: ICD-10-CM

## 2023-11-16 PROCEDURE — 99214 OFFICE O/P EST MOD 30 MIN: CPT | Performed by: FAMILY MEDICINE

## 2023-11-16 PROCEDURE — 90686 IIV4 VACC NO PRSV 0.5 ML IM: CPT

## 2023-11-16 PROCEDURE — G0008 ADMIN INFLUENZA VIRUS VAC: HCPCS

## 2023-11-16 RX ORDER — BENZONATATE 100 MG/1
100 CAPSULE ORAL 3 TIMES DAILY PRN
Qty: 21 CAPSULE | Refills: 0 | Status: SHIPPED | OUTPATIENT
Start: 2023-11-16

## 2023-11-16 NOTE — PATIENT INSTRUCTIONS
[facility]  [email or web address] Bere [email or web address]  [address], [place]  [phone]  Medicare Preventive Visit Patient Instructions  Thank you for completing your Welcome to Medicare Visit or Medicare Annual Wellness Visit today. Your next wellness visit will be due in one year (11/16/2024). The screening/preventive services that you may require over the next 5-10 years are detailed below. Some tests may not apply to you based off risk factors and/or age. Screening tests ordered at today's visit but not completed yet may show as past due. Also, please note that scanned in results may not display below. Preventive Screenings:  Service Recommendations Previous Testing/Comments   Colorectal Cancer Screening  * Colonoscopy    * Fecal Occult Blood Test (FOBT)/Fecal Immunochemical Test (FIT)  * Fecal DNA/Cologuard Test  * Flexible Sigmoidoscopy Age: 43-73 years old   Colonoscopy: every 10 years (may be performed more frequently if at higher risk)  OR  FOBT/FIT: every 1 year  OR  Cologuard: every 3 years  OR  Sigmoidoscopy: every 5 years  Screening may be recommended earlier than age 39 if at higher risk for colorectal cancer. Also, an individualized decision between you and your healthcare provider will decide whether screening between the ages of 77-80 would be appropriate. Colonoscopy: Not on file  FOBT/FIT: Not on file  Cologuard: Not on file  Sigmoidoscopy: Not on file          Breast Cancer Screening Age: 36 years old  Frequency: every 1-2 years  Not required if history of left and right mastectomy Mammogram: Not on file        Cervical Cancer Screening Between the ages of 21-29, pap smear recommended once every 3 years. Between the ages of 32-69, can perform pap smear with HPV co-testing every 5 years.    Recommendations may differ for women with a history of total hysterectomy, cervical cancer, or abnormal pap smears in past. Pap Smear: 10/24/2022        Hepatitis C Screening Once for adults born between 1945 and 1965  More frequently in patients at high risk for Hepatitis C Hep C Antibody: 10/24/2022        Diabetes Screening 1-2 times per year if you're at risk for diabetes or have pre-diabetes Fasting glucose: 79 mg/dL (11/4/2022)  A1C: No results in last 5 years (No results in last 5 years)      Cholesterol Screening Once every 5 years if you don't have a lipid disorder. May order more often based on risk factors. Lipid panel: 12/20/2022          Other Preventive Screenings Covered by Medicare:  Abdominal Aortic Aneurysm (AAA) Screening: covered once if your at risk. You're considered to be at risk if you have a family history of AAA. Lung Cancer Screening: covers low dose CT scan once per year if you meet all of the following conditions: (1) Age 48-67; (2) No signs or symptoms of lung cancer; (3) Current smoker or have quit smoking within the last 15 years; (4) You have a tobacco smoking history of at least 20 pack years (packs per day multiplied by number of years you smoked); (5) You get a written order from a healthcare provider. Glaucoma Screening: covered annually if you're considered high risk: (1) You have diabetes OR (2) Family history of glaucoma OR (3)  aged 48 and older OR (3)  American aged 72 and older  Osteoporosis Screening: covered every 2 years if you meet one of the following conditions: (1) You're estrogen deficient and at risk for osteoporosis based off medical history and other findings; (2) Have a vertebral abnormality; (3) On glucocorticoid therapy for more than 3 months; (4) Have primary hyperparathyroidism; (5) On osteoporosis medications and need to assess response to drug therapy. Last bone density test (DXA Scan): Not on file. HIV Screening: covered annually if you're between the age of 14-79. Also covered annually if you are younger than 13 and older than 72 with risk factors for HIV infection.  For pregnant patients, it is covered up to 3 times per pregnancy. Immunizations:  Immunization Recommendations   Influenza Vaccine Annual influenza vaccination during flu season is recommended for all persons aged >= 6 months who do not have contraindications   Pneumococcal Vaccine   * Pneumococcal conjugate vaccine = PCV13 (Prevnar 13), PCV15 (Vaxneuvance), PCV20 (Prevnar 20)  * Pneumococcal polysaccharide vaccine = PPSV23 (Pneumovax) Adults 93-03 yo with certain risk factors or if 69+ yo  If never received any pneumonia vaccine: recommend Prevnar 20 (PCV20)  Give PCV20 if previously received 1 dose of PCV13 or PPSV23   Hepatitis B Vaccine 3 dose series if at intermediate or high risk (ex: diabetes, end stage renal disease, liver disease)   Respiratory syncytial virus (RSV) Vaccine - COVERED BY MEDICARE PART D  * RSVPreF3 (Arexvy) CDC recommends that adults 61years of age and older may receive a single dose of RSV vaccine using shared clinical decision-making (SCDM)   Tetanus (Td) Vaccine - COST NOT COVERED BY MEDICARE PART B Following completion of primary series, a booster dose should be given every 10 years to maintain immunity against tetanus. Td may also be given as tetanus wound prophylaxis. Tdap Vaccine - COST NOT COVERED BY MEDICARE PART B Recommended at least once for all adults. For pregnant patients, recommended with each pregnancy. Shingles Vaccine (Shingrix) - COST NOT COVERED BY MEDICARE PART B  2 shot series recommended in those 23 years and older who have or will have weakened immune systems or those 50 years and older     Health Maintenance Due:      Topic Date Due    Cervical Cancer Screening  10/24/2025    HIV Screening  Completed    Hepatitis C Screening  Completed     Immunizations Due:      Topic Date Due    COVID-19 Vaccine (1) Never done    Influenza Vaccine (1) 09/01/2023     Advance Directives   What are advance directives?   Advance directives are legal documents that state your wishes and plans for medical care. These plans are made ahead of time in case you lose your ability to make decisions for yourself. Advance directives can apply to any medical decision, such as the treatments you want, and if you want to donate organs. What are the types of advance directives? There are many types of advance directives, and each state has rules about how to use them. You may choose a combination of any of the following:  Living will: This is a written record of the treatment you want. You can also choose which treatments you do not want, which to limit, and which to stop at a certain time. This includes surgery, medicine, IV fluid, and tube feedings. Durable power of  for healthcare [facility]): This is a written record that states who you want to make healthcare choices for you when you are unable to make them for yourself. This person, called a proxy, is usually a family member or a friend. You may choose more than 1 proxy. Do not resuscitate (DNR) order:  A DNR order is used in case your heart stops beating or you stop breathing. It is a request not to have certain forms of treatment, such as CPR. A DNR order may be included in other types of advance directives. Medical directive: This covers the care that you want if you are in a coma, near death, or unable to make decisions for yourself. You can list the treatments you want for each condition. Treatment may include pain medicine, surgery, blood transfusions, dialysis, IV or tube feedings, and a ventilator (breathing machine). Values history: This document has questions about your views, beliefs, and how you feel and think about life. This information can help others choose the care that you would choose. Why are advance directives important? An advance directive helps you control your care. Although spoken wishes may be used, it is better to have your wishes written down. Spoken wishes can be misunderstood, or not followed.  Treatments may be given even if you do not want them. An advance directive may make it easier for your family to make difficult choices about your care. Weight Management   Why it is important to manage your weight:  Being overweight increases your risk of health conditions such as heart disease, high blood pressure, type 2 diabetes, and certain types of cancer. It can also increase your risk for osteoarthritis, sleep apnea, and other respiratory problems. Aim for a slow, steady weight loss. Even a small amount of weight loss can lower your risk of health problems. How to lose weight safely:  A safe and healthy way to lose weight is to eat fewer calories and get regular exercise. You can lose up about 1 pound a week by decreasing the number of calories you eat by 500 calories each day. Healthy meal plan for weight management:  A healthy meal plan includes a variety of foods, contains fewer calories, and helps you stay healthy. A healthy meal plan includes the following:  Eat whole-grain foods more often. A healthy meal plan should contain fiber. Fiber is the part of grains, fruits, and vegetables that is not broken down by your body. Whole-grain foods are healthy and provide extra fiber in your diet. Some examples of whole-grain foods are whole-wheat breads and pastas, oatmeal, brown rice, and bulgur. Eat a variety of vegetables every day. Include dark, leafy greens such as spinach, kale, adrienne greens, and mustard greens. Eat yellow and orange vegetables such as carrots, sweet potatoes, and winter squash. Eat a variety of fruits every day. Choose fresh or canned fruit (canned in its own juice or light syrup) instead of juice. Fruit juice has very little or no fiber. Eat low-fat dairy foods. Drink fat-free (skim) milk or 1% milk. Eat fat-free yogurt and low-fat cottage cheese. Try low-fat cheeses such as mozzarella and other reduced-fat cheeses. Choose meat and other protein foods that are low in fat.   Choose beans or other legumes such as split peas or lentils. Choose fish, skinless poultry (chicken or turkey), or lean cuts of red meat (beef or pork). Before you cook meat or poultry, cut off any visible fat. Use less fat and oil. Try baking foods instead of frying them. Add less fat, such as margarine, sour cream, regular salad dressing and mayonnaise to foods. Eat fewer high-fat foods. Some examples of high-fat foods include french fries, doughnuts, ice cream, and cakes. Eat fewer sweets. Limit foods and drinks that are high in sugar. This includes candy, cookies, regular soda, and sweetened drinks. Exercise:  Exercise at least 30 minutes per day on most days of the week. Some examples of exercise include walking, biking, dancing, and swimming. You can also fit in more physical activity by taking the stairs instead of the elevator or parking farther away from stores. Ask your healthcare provider about the best exercise plan for you. © Copyright [employer] 2018 Information is for End User's use only and may not be sold, redistributed or otherwise used for commercial purposes.  All illustrations and images included in CareNotes® are the copyrighted property of [employer] or [address] [place], [place] [address]ols

## 2023-11-16 NOTE — PROGRESS NOTES
Problem List Items Addressed This Visit       Mild intellectual disability  Stable. Pretty independent and works closely with Shraddha Thrasher. ICF appropriate     Anxiety and depression  Increased anxiety and depression following the baby. Also dealing with personal stress. No concerns for self harm or HI. Interested in baby and me. Number provided to schedule      Other Visit Diagnoses       Chronic cough    -  Primary    Started 1 month ago and worse at night. Lungs are clear. Tessalon pearls ordered. If cough persist, consider z pack    Relevant Medications    benzonatate (TESSALON PERLES) 100 mg capsule    Encounter for immunization        Consented to flu vaccine    Relevant Orders    influenza vaccine, quadrivalent, 0.5 mL, preservative-free, for adult and pediatric patients 6 mos+ (AFLURIA, FLUARIX, FLULAVAL, FLUZONE) (Completed)          BMI Counseling: Body mass index is 38.88 kg/m². The BMI is above normal. Nutrition recommendations include encouraging healthy choices of fruits and vegetables, decreasing fast food intake, limiting drinks that contain sugar, moderation in carbohydrate intake, increasing intake of lean protein, reducing intake of saturated and trans fat and reducing intake of cholesterol. Exercise recommendations include moderate physical activity 150 minutes/week. No pharmacotherapy was ordered. Rationale for BMI follow-up plan is due to patient being overweight or obese. Preventive health issues were discussed with patient, and age appropriate screening tests were ordered as noted in patient's After Visit Summary. Personalized health advice and appropriate referrals for health education or preventive services given if needed, as noted in patient's After Visit Summary. History of Present Illness:     Patient presents for a Medicare Wellness Visit    Seen today as a new patient. Last PCP was at [facility] [facility] in [facility]. I had seen her years ago when working at [employer]. Here with Shraddha Thrasher.  Been feeling depressed for several months. Adjusting to being a new mom. Dealing with a lot of personal stress which she did not share. Interested in baby and me. Also complains of a cough. Dry and started several weeks ago. The rest of the cold symptoms have resolved and is left with a cough. No fever, asthma dx, SOB, or wheezing. Also denies fevers.         Patient Care Team:  Radu Akers MD as PCP - General (Family Medicine)  Viktoriya Sinha MD (Obstetrics and Gynecology)  Patt Chauhan MD (Obstetrics and Gynecology)  Mayra Salcedo MD (Obstetrics and Gynecology)  Shubham Smith MD (Obstetrics and Gynecology)  Rehan Osullivan MD (Obstetrics and Gynecology)  Anette Lindsay MD (Obstetrics and Gynecology)  Jesusita Morocho MD (Obstetrics and Gynecology)  LIZ Norton (Obstetrics and Gynecology)  Abhishek Benson PA-C as Physician Assistant (Physician Assistant)  Jae Jiang PA-C as Physician Assistant (Physician Assistant)  Karla Hayes as Nurse Practitioner (Obstetrics and Gynecology)  Kinsey Elaine as Nurse Practitioner (Nurse Practitioner)  LIZ Morillo (Obstetrics and Gynecology)     Review of Systems:     Review of Systems     Problem List:     Patient Active Problem List   Diagnosis   • Fetal alcohol syndrome   • GERD (gastroesophageal reflux disease)   • Mild intellectual disability   • Major depression, recurrent, chronic (HCC)   • Migraine without aura, not intractable   • Anxiety and depression   • Acute thoracic myofascial strain   • Chronic midline low back pain without sciatica   • Urinary frequency   • Amenorrhea   • 40 weeks gestation of pregnancy   • Major depressive disorder, recurrent 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 psychotic features ([address])   • PTSD (post-traumatic stress disorder)   • Anemia of mother in pregnancy, antepartum, third trimester   • Maternal morbid obesity in third trimester, antepartum ([address]) • Polyhydramnios in third trimester   • Positive GBS test   • Contraception management   • S/P primary low transverse    • Acute cough      Past Medical and Surgical History:     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   • Personality disorder ([address])    • Varicella     Vaccinated     Past Surgical History:   Procedure Laterality Date   • NO PAST SURGERIES     • NH  DELIVERY ONLY N/A 2023    Procedure:  SECTION ();   Surgeon: Edmond Silvestre MD;  Location: [facility] ;  Service: Obstetrics      Family History:     Family History   Adopted: Yes   Problem Relation Age of Onset   • Intellectual disability Sister    • Intellectual disability Brother    • Intellectual disability Brother       Social History:     Social History     Socioeconomic History   • Marital status: Single     Spouse name: None   • Number of children: None   • Years of education: None   • Highest education level: None   Occupational History   • None   Tobacco Use   • Smoking status: Never   • Smokeless tobacco: Never   Vaping Use   • Vaping Use: Never used   Substance and Sexual Activity   • Alcohol use: Not Currently     Comment: not since confiemed preg   • Drug use: Never   • Sexual activity: Not Currently     Partners: Male     Birth control/protection: None, Implant     Comment: Nexplanon 6/15/23   Other Topics Concern   • None   Social History Narrative   • None     Social Determinants of Health     Financial Resource Strain: Low Risk  (2022)    Overall Financial Resource Strain (CARDIA)    • Difficulty of Paying Living Expenses: Not hard at all   Food Insecurity: No Food Insecurity (3/17/2022)    Hunger Vital Sign    • Worried About Running Out of Food in the Last Year: Never true    • Ran Out of Food in the Last Year: Never true   Transportation Needs: No Transportation Needs (9/1/2022)    PRAPARE - Transportation    • Lack of Transportation (Medical): No    • Lack of Transportation (Non-Medical): No   Physical Activity: Insufficiently Active (9/1/2022)    Exercise Vital Sign    • Days of Exercise per Week: 1 day    • Minutes of Exercise per Session: 60 min   Stress: No Stress Concern Present (9/1/2022)    [facility]    • Feeling of Stress : Only a little   Social Connections: Not on file   Intimate Partner Violence: Not At Risk (9/1/2022)    Humiliation, Afraid, Rape, and Kick questionnaire    • Fear of Current or Ex-Partner: No    • Emotionally Abused: No    • Physically Abused: No    • Sexually Abused: No   Housing Stability: Low Risk  (9/1/2022)    Housing Stability Vital Sign    • Unable to Pay for Housing in the Last Year: No    • Number of [address] in the Last Year: 1    • Unstable Housing in the Last Year: No      Medications and Allergies:     Current Outpatient Medications   Medication Sig Dispense Refill   • acetaminophen (TYLENOL) 325 mg tablet Take 2 tablets (650 mg total) by mouth every 6 (six) hours as needed for mild pain for up to 8 doses 16 tablet 0   • benzonatate (TESSALON PERLES) 100 mg capsule Take 1 capsule (100 mg total) by mouth 3 (three) times a day as needed for cough 21 capsule 0   • ibuprofen (MOTRIN) 600 mg tablet Take 1 tablet (600 mg total) by mouth every 6 (six) hours as needed for mild pain 30 tablet 0   • sertraline (ZOLOFT) 100 mg tablet Take 2 tablets (200 mg total) by mouth daily 60 tablet 2   • SUMAtriptan (IMITREX) 25 mg tablet Take 2 tablets (50 mg total) by mouth once as needed for migraine for up to 1 dose 60 tablet 2   • FERROUS SULFATE PO Take by mouth every other day (Patient not taking: Reported on 9/6/2023)       No current facility-administered medications for this visit.      No Known Allergies   Immunizations:     Immunization History   Administered Date(s) Administered   • BCG 1993   • DTP 1993, 01/31/1994, 03/21/1994, 11/13/1995   • DTaP 08/09/1999   • HPV Quadrivalent 08/22/2016, 02/28/2017   • HPV9 10/24/2016   • Hep B, Adolescent or Pediatric 07/06/1999   • Hep B, adult 11/24/2020, 01/26/2021   • INFLUENZA 10/14/1999, 11/15/1999   • Influenza Quadrivalent, 6-35 Months IM 10/24/2016   • Influenza, injectable, quadrivalent, preservative free 0.5 mL 10/09/2018, 09/22/2020, 11/23/2022, 11/16/2023   • Influenza, recombinant, quadrivalent,injectable, preservative free 10/28/2021   • Influenza, seasonal, injectable 12/08/2014   • MMR 07/06/1999, 08/09/1999   • Measles 08/23/1994   • OPV 1993, 01/31/1994, 03/21/1994, 11/22/1994, 01/30/1995, 09/29/1995, 11/13/1995   • Rabies-IM Human Diploid Cell Culture 12/10/2020   • Tdap 11/30/2021   • Varicella 07/06/1999      Health Maintenance:         Topic Date Due   • Cervical Cancer Screening  10/24/2025   • HIV Screening  Completed   • Hepatitis C Screening  Completed         Topic Date Due   • COVID-19 Vaccine (1) Never done      Medicare Screening Tests and Risk Assessments:     Nahomy Ferguson is here for her Subsequent Wellness visit. Health Risk Assessment:   Patient rates overall health as very good. Patient feels that their physical health rating is slightly better. Patient is satisfied with their life. Eyesight was rated as same. Hearing was rated as same. Patient feels that their emotional and mental health rating is slightly worse. Patients states they are never, rarely angry. Patient states they are sometimes unusually tired/fatigued. Pain experienced in the last 7 days has been none. Patient states that she has experienced weight loss or gain in last 6 months. Fall Risk Screening: In the past year, patient has experienced: no history of falling in past year      Urinary Incontinence Screening:   Patient has not leaked urine accidently in the last six months.      Home Safety:  Patient does not have trouble with stairs inside or outside of their home. Patient has working smoke alarms Home safety hazards include: none. Nutrition:   Current diet is Regular. Medications:   Patient is not currently taking any over-the-counter supplements. Patient is able to manage medications. Activities of Daily Living (ADLs)/Instrumental Activities of Daily Living (IADLs):   Walk and transfer into and out of bed and chair?: Yes  Dress and groom yourself?: Yes    Bathe or shower yourself?: Yes    Feed yourself? Yes  Do your laundry/housekeeping?: Yes  Manage your money, pay your bills and track your expenses?: No  Make your own meals?: Yes    Do your own shopping?: No    Previous Hospitalizations:   Any hospitalizations or ED visits within the last 12 months?: Yes    How many hospitalizations have you had in the last year?: 1-2    Advance Care Planning:   Living will: No    Durable POA for healthcare: No    Advanced directive: No      PREVENTIVE SCREENINGS      Cardiovascular Screening:    General: Screening Current      Breast Cancer Screening:     General: Screening Not Indicated      Cervical Cancer Screening:    General: Screening Current      Lung Cancer Screening:     General: Screening Not Indicated      Hepatitis C Screening:    General: Screening Current    Screening, Brief Intervention, and Referral to Treatment (SBIRT)    Screening      Single Item Drug Screening:  How often have you used an illegal drug (including marijuana) or a prescription medication for non-medical reasons in the past year? never    Single Item Drug Screen Score: 0  Interpretation: Negative screen for possible drug use disorder    No results found. Physical Exam:     /72 (BP Location: Left arm, Patient Position: Sitting, Cuff Size: Large)   Pulse 102   Temp 99.3 °F (37.4 °C) (Tympanic)   Resp 16   Ht 5' 2.5" (1.588 m)   Wt 98 kg (216 lb)   SpO2 98%   BMI 38.88 kg/m²     Physical Exam  Vitals reviewed. Constitutional:       General: She is not in acute distress. Appearance: Normal appearance. She is not ill-appearing or toxic-appearing. HENT:      Head: Normocephalic and atraumatic. Right Ear: Tympanic membrane normal.      Left Ear: Tympanic membrane normal.      Mouth/Throat:      Mouth: Mucous membranes are moist.   Eyes:      Extraocular Movements: Extraocular movements intact. Cardiovascular:      Rate and Rhythm: Normal rate and regular rhythm. Heart sounds: No murmur heard. Pulmonary:      Effort: Pulmonary effort is normal. No respiratory distress. Breath sounds: Normal breath sounds. No wheezing or rales. Abdominal:      General: There is no distension. Palpations: Abdomen is soft. There is no mass. Tenderness: There is no abdominal tenderness. There is no guarding or rebound. Hernia: No hernia is present. Lymphadenopathy:      Cervical: No cervical adenopathy. Skin:     General: Skin is warm. Neurological:      Mental Status: She is alert and oriented to person, place, and time.    Psychiatric:         Mood and Affect: Mood normal.         Behavior: Behavior normal.          Keiry Otero MD

## 2023-11-21 PROBLEM — R05.1 ACUTE COUGH: Status: RESOLVED | Noted: 2023-09-22 | Resolved: 2023-11-21

## 2023-11-22 ENCOUNTER — TELEPHONE (OUTPATIENT)
Dept: PSYCHIATRY | Facility: CLINIC | Age: 28
End: 2023-11-22

## 2023-11-22 NOTE — TELEPHONE ENCOUNTER
The patient contacted the office, requesting to reschedule her appt on 11/22/23 at 2:00 pm. The patient stated she missed her appt that she believed was scheduled for 11/22/23 at 3:00 pm. Writer transferred the call to the resident department for assistance

## 2023-12-06 ENCOUNTER — TELEPHONE (OUTPATIENT)
Dept: PSYCHIATRY | Facility: CLINIC | Age: 28
End: 2023-12-06

## 2023-12-06 NOTE — TELEPHONE ENCOUNTER
Gayla Herr requested a call back to discuss possibly changing the time on the appt. .writer transferred up to resident line for assistance. They can be reached at P# [phone]. Thank you.

## 2023-12-14 RX ORDER — SERTRALINE HYDROCHLORIDE 100 MG/1
TABLET, FILM COATED ORAL
Qty: 180 TABLET | Refills: 0 | Status: SHIPPED | OUTPATIENT
Start: 2023-12-14 | End: 2023-12-21 | Stop reason: ALTCHOICE

## 2023-12-21 ENCOUNTER — OFFICE VISIT (OUTPATIENT)
Dept: PSYCHIATRY | Facility: CLINIC | Age: 28
End: 2023-12-21
Payer: MEDICARE

## 2023-12-21 VITALS — BODY MASS INDEX: 38.52 KG/M2 | WEIGHT: 214 LBS

## 2023-12-21 DIAGNOSIS — F33.0 MILD EPISODE OF RECURRENT MAJOR DEPRESSIVE DISORDER (HCC): Primary | ICD-10-CM

## 2023-12-21 DIAGNOSIS — R11.0 NAUSEA: ICD-10-CM

## 2023-12-21 DIAGNOSIS — F41.9 ANXIETY DISORDER, UNSPECIFIED TYPE: ICD-10-CM

## 2023-12-21 DIAGNOSIS — F43.10 PTSD (POST-TRAUMATIC STRESS DISORDER): ICD-10-CM

## 2023-12-21 PROCEDURE — 99214 OFFICE O/P EST MOD 30 MIN: CPT

## 2023-12-21 RX ORDER — ESCITALOPRAM OXALATE 5 MG/1
5 TABLET ORAL DAILY
Qty: 90 TABLET | Refills: 1 | Status: SHIPPED | OUTPATIENT
Start: 2023-12-21

## 2023-12-21 RX ORDER — ONDANSETRON 4 MG/1
4 TABLET, FILM COATED ORAL EVERY 6 HOURS PRN
Qty: 30 TABLET | Refills: 1 | Status: SHIPPED | OUTPATIENT
Start: 2023-12-21
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
## 2023-12-21 NOTE — PATIENT INSTRUCTIONS
Please present for your previously scheduled appointment approximately 15 minutes prior to appointment time. If you are running late or are unable to attend your appointment, please call our [place] office at [phone] or our [place] office at [phone] if applicable to notify the office of your absence.    If you are in psychological crisis including not limited to suicidal/homicidal thoughts or thoughts of self-injury, do not hesitate to call/contact your County Crisis hotline (see below) or attend to the nearest emergency department.  [facility] Crisis: [phone]  [facility] Crisis: [phone]  [facility] Crisis: [phone]  [facility] Crisis: [phone]  [facility] Crisis: [phone]  [facility] Crisis: [phone]  [facility] Crisis: [phone]  National Suicide Prevention Hotline: 1-374.464.7174

## 2024-01-04 ENCOUNTER — OFFICE VISIT (OUTPATIENT)
Dept: FAMILY MEDICINE CLINIC | Facility: CLINIC | Age: 29
End: 2024-01-04
Payer: MEDICARE

## 2024-01-04 VITALS
BODY MASS INDEX: 39.45 KG/M2 | WEIGHT: 214.4 LBS | HEART RATE: 75 BPM | TEMPERATURE: 99.2 F | HEIGHT: 62 IN | SYSTOLIC BLOOD PRESSURE: 118 MMHG | RESPIRATION RATE: 16 BRPM | DIASTOLIC BLOOD PRESSURE: 80 MMHG | OXYGEN SATURATION: 96 %

## 2024-01-04 DIAGNOSIS — F41.9 ANXIETY DISORDER, UNSPECIFIED TYPE: ICD-10-CM

## 2024-01-04 DIAGNOSIS — J40 BRONCHITIS: ICD-10-CM

## 2024-01-04 DIAGNOSIS — R39.9 UTI SYMPTOMS: ICD-10-CM

## 2024-01-04 DIAGNOSIS — F33.0 MILD EPISODE OF RECURRENT MAJOR DEPRESSIVE DISORDER (HCC): ICD-10-CM

## 2024-01-04 DIAGNOSIS — G43.009 MIGRAINE WITHOUT AURA AND WITHOUT STATUS MIGRAINOSUS, NOT INTRACTABLE: ICD-10-CM

## 2024-01-04 DIAGNOSIS — Z11.3 SCREEN FOR STD (SEXUALLY TRANSMITTED DISEASE): ICD-10-CM

## 2024-01-04 DIAGNOSIS — F43.10 PTSD (POST-TRAUMATIC STRESS DISORDER): ICD-10-CM

## 2024-01-04 DIAGNOSIS — F33.2 MAJOR DEPRESSIVE DISORDER, RECURRENT SEVERE WITHOUT PSYCHOTIC FEATURES (HCC): ICD-10-CM

## 2024-01-04 DIAGNOSIS — N92.6 MISSED PERIOD: Primary | ICD-10-CM

## 2024-01-04 DIAGNOSIS — E66.01 MORBID (SEVERE) OBESITY DUE TO EXCESS CALORIES (HCC): ICD-10-CM

## 2024-01-04 LAB
SL AMB  POCT GLUCOSE, UA: ABNORMAL
SL AMB LEUKOCYTE ESTERASE,UA: ABNORMAL
SL AMB POCT BILIRUBIN,UA: ABNORMAL
SL AMB POCT BLOOD,UA: ABNORMAL
SL AMB POCT CLARITY,UA: ABNORMAL
SL AMB POCT COLOR,UA: YELLOW
SL AMB POCT KETONES,UA: ABNORMAL
SL AMB POCT NITRITE,UA: ABNORMAL
SL AMB POCT PH,UA: 5.5
SL AMB POCT SPECIFIC GRAVITY,UA: 1.03
SL AMB POCT URINE HCG: NEGATIVE
SL AMB POCT URINE PROTEIN: ABNORMAL
SL AMB POCT UROBILINOGEN: ABNORMAL

## 2024-01-04 PROCEDURE — 99214 OFFICE O/P EST MOD 30 MIN: CPT | Performed by: FAMILY MEDICINE

## 2024-01-04 PROCEDURE — 87491 CHLMYD TRACH DNA AMP PROBE: CPT | Performed by: FAMILY MEDICINE

## 2024-01-04 PROCEDURE — 81002 URINALYSIS NONAUTO W/O SCOPE: CPT | Performed by: FAMILY MEDICINE

## 2024-01-04 PROCEDURE — 87086 URINE CULTURE/COLONY COUNT: CPT | Performed by: FAMILY MEDICINE

## 2024-01-04 PROCEDURE — 87591 N.GONORRHOEAE DNA AMP PROB: CPT | Performed by: FAMILY MEDICINE

## 2024-01-04 PROCEDURE — 81025 URINE PREGNANCY TEST: CPT | Performed by: FAMILY MEDICINE

## 2024-01-04 RX ORDER — RIBOFLAVIN (VITAMIN B2) 400 MG
1 TABLET ORAL DAILY
Qty: 30 TABLET | Refills: 1 | Status: SHIPPED | OUTPATIENT
Start: 2024-01-04

## 2024-01-04 RX ORDER — AZITHROMYCIN 250 MG/1
TABLET, FILM COATED ORAL
Qty: 6 TABLET | Refills: 0 | Status: SHIPPED | OUTPATIENT
Start: 2024-01-04 | End: 2024-01-08

## 2024-01-04 RX ORDER — CALCIUM CARBONATE 300MG(750)
1 TABLET,CHEWABLE ORAL DAILY
Qty: 30 TABLET | Refills: 1 | Status: SHIPPED | OUTPATIENT
Start: 2024-01-04

## 2024-01-04 RX ORDER — ESCITALOPRAM OXALATE 10 MG/1
10 TABLET ORAL DAILY
Qty: 30 TABLET | Refills: 1 | Status: SHIPPED | OUTPATIENT
Start: 2024-01-04

## 2024-01-04 NOTE — PROGRESS NOTES
Name: Sherry Diaz      : 1995      MRN: [id number]  Encounter Provider: Kassy Esquivel MD  Encounter Date: 2024   Encounter department: [facility]    Assessment & Plan     28-year-old female with a history of major depressive disorder, migraines, fetal alcohol syndrome, and PTSD here with several concerns.  Patient has been experiencing more migraines.  She was headache free for several months.  Recommend magnesium and riboflavin.  Patient has an ongoing cough which has been present for a month now.  Tessalon Perles were not covered by insurance and has not tried any over-the-counter remedies.  She denies fever,  chest tightness, or shortness of breath.  Lungs clear on exam.  Recommend azithromycin but will wait until we get the results of the urine culture which showed leukocytes on urine dip.  There was mild tenderness in the left lower quadrant. LMP several months ago but is on contraception. HCG requested and neg.  If pain persist, recommended transvaginal ultrasound to evaluate the area.  Lexapro increased from 5 to 10 mg daily and encourage patient to schedule with [facility] center.  Lastly, I ordered CG/Chlamydia to r/o STD. Suggest vaginal exam at next appt with vaginal sx persist.     1. Missed period  -     POCT urine HCG    2. UTI symptoms  -     POCT urine dip  -     Urine culture  -     Chlamydia/GC amplified DNA by PCR    3. Screen for STD (sexually transmitted disease)    4. Mild episode of recurrent major depressive disorder (HCC)  -     escitalopram (LEXAPRO) 10 mg tablet; Take 1 tablet (10 mg total) by mouth daily    5. Major depressive disorder, recurrent 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 psychotic features (HCC)    6. Morbid (severe) obesity due to excess calories (HCC)    7. Anxiety disorder, unspecified type  -     escitalopram (LEXAPRO) 10 mg tablet; Take 1 tablet (10 mg total) by mouth daily    8. PTSD (post-traumatic stress disorder)  -     escitalopram (LEXAPRO)  10 mg tablet; Take 1 tablet (10 mg total) by mouth daily    9. Migraine without aura and without status migrainosus, not intractable  -     Magnesium 400 MG TABS; Take 1 tablet (400 mg total) by mouth in the morning  -     Riboflavin 400 MG TABS; Take 1 tablet (400 mg total) by mouth in the morning    10. Bronchitis  -     azithromycin (ZITHROMAX) 250 mg tablet; Take 2 tablets today then 1 tablet daily x 4 days           Subjective      Here with vaginal discharged ( white discharge)   No urinary frequency, burning,   Did have LLQ pain yesterday but has resolved   Has nexplanon implant   Last period was months ago   Vomiting once last  last week  Nausea lasted a few days  Headaches several times a week   Had been headache free for months after the baby  Will get dizzy before the headaches   Eating and drinking well  Started lexapro but is ineffective. Still feeling depressed. No HI/SI  Has yet to schedule with the baby and me     Cough    Urinary Tract Infection     Review of Systems   Respiratory:  Positive for cough.        Current Outpatient Medications on File Prior to Visit   Medication Sig   • acetaminophen (TYLENOL) 325 mg tablet Take 2 tablets (650 mg total) by mouth every 6 (six) hours as needed for mild pain for up to 8 doses   • benzonatate (TESSALON PERLES) 100 mg capsule Take 1 capsule (100 mg total) by mouth 3 (three) times a day as needed for cough   • ibuprofen (MOTRIN) 600 mg tablet Take 1 tablet (600 mg total) by mouth every 6 (six) hours as needed for mild pain   • ondansetron (ZOFRAN) 4 mg tablet Take 1 tablet (4 mg total) by mouth every 6 (six) hours as needed for nausea or vomiting   • SUMAtriptan (IMITREX) 25 mg tablet Take 2 tablets (50 mg total) by mouth once as needed for migraine for up to 1 dose   • [DISCONTINUED] escitalopram (LEXAPRO) 5 mg tablet Take 1 tablet (5 mg total) by mouth daily   • [DISCONTINUED] FERROUS SULFATE PO Take by mouth every other day (Patient not taking: Reported on  "9/6/2023)       Objective     /80 (BP Location: Left arm, Patient Position: Sitting, Cuff Size: Large)   Pulse 75   Temp 99.2 °F (37.3 °C) (Tympanic)   Resp 16   Ht 5' 2\" (1.575 m)   Wt 97.3 kg (214 lb 6.4 oz)   SpO2 96%   BMI 39.21 kg/m²     Physical Exam  Constitutional:       Appearance: Normal appearance.   HENT:      Head: Normocephalic and atraumatic.      Mouth/Throat:      Mouth: Mucous membranes are moist.   Eyes:      Extraocular Movements: Extraocular movements intact.   Cardiovascular:      Rate and Rhythm: Normal rate and regular rhythm.      Heart sounds: No murmur heard.  Pulmonary:      Effort: Pulmonary effort is normal. No respiratory distress.      Breath sounds: Normal breath sounds. No stridor. No wheezing, rhonchi or rales.   Abdominal:      General: There is no distension.      Palpations: Abdomen is soft. There is no mass.      Tenderness: There is abdominal tenderness (LLQ, no CVA tenderness). There is no right CVA tenderness, left CVA tenderness or rebound.      Hernia: No hernia is present.   Musculoskeletal:      Right lower leg: No edema.      Left lower leg: No edema.   Skin:     General: Skin is warm.   Neurological:      Mental Status: She is alert and oriented to person, place, and time.   Psychiatric:         Behavior: Behavior normal.       Kassy Esquivel MD    "

## 2024-01-05 LAB
C TRACH DNA SPEC QL NAA+PROBE: NEGATIVE
N GONORRHOEA DNA SPEC QL NAA+PROBE: NEGATIVE

## 2024-01-06 LAB — BACTERIA UR CULT: NORMAL

## 2024-02-01 DIAGNOSIS — F41.9 ANXIETY DISORDER, UNSPECIFIED TYPE: ICD-10-CM

## 2024-02-01 DIAGNOSIS — F33.0 MILD EPISODE OF RECURRENT MAJOR DEPRESSIVE DISORDER (HCC): ICD-10-CM

## 2024-02-01 DIAGNOSIS — F43.10 PTSD (POST-TRAUMATIC STRESS DISORDER): ICD-10-CM

## 2024-02-01 RX ORDER — ESCITALOPRAM OXALATE 10 MG/1
TABLET ORAL
Qty: 30 TABLET | Refills: 1 | Status: SHIPPED | OUTPATIENT
Start: 2024-02-01 | End: 2024-02-07 | Stop reason: SDUPTHER

## 2024-02-07 ENCOUNTER — OFFICE VISIT (OUTPATIENT)
Dept: PSYCHIATRY | Facility: CLINIC | Age: 29
End: 2024-02-07
Payer: MEDICARE

## 2024-02-07 VITALS — WEIGHT: 214 LBS | BODY MASS INDEX: 39.14 KG/M2

## 2024-02-07 DIAGNOSIS — F43.10 PTSD (POST-TRAUMATIC STRESS DISORDER): ICD-10-CM

## 2024-02-07 DIAGNOSIS — F33.0 MILD EPISODE OF RECURRENT MAJOR DEPRESSIVE DISORDER (HCC): Primary | ICD-10-CM

## 2024-02-07 DIAGNOSIS — F41.9 ANXIETY DISORDER, UNSPECIFIED TYPE: ICD-10-CM

## 2024-02-07 DIAGNOSIS — R11.0 NAUSEA: ICD-10-CM

## 2024-02-07 PROCEDURE — 99214 OFFICE O/P EST MOD 30 MIN: CPT

## 2024-02-07 RX ORDER — ONDANSETRON 4 MG/1
4 TABLET, FILM COATED ORAL EVERY 6 HOURS PRN
Qty: 90 TABLET | Refills: 0 | Status: SHIPPED | OUTPATIENT
Start: 2024-02-07

## 2024-02-07 RX ORDER — ESCITALOPRAM OXALATE 10 MG/1
10 TABLET ORAL DAILY
Qty: 90 TABLET | Refills: 0 | Status: SHIPPED | OUTPATIENT
Start: 2024-02-07

## 2024-02-07 NOTE — BH CRISIS PLAN
Client Name: STANISLAW Diaz       Client YOB: 1995    [place] Safety Plan      Creation Date: 2/7/24 Update Date: 2/7/24   Created By: Juan A Hutchinson MD Last Updated By: Juan A Hutchinson MD      Step 1: Warning Signs:   Warning Signs   When I shut down and don't talk to anyone.   When I'm not eating.            Step 2: Internal Coping Strategies:   Internal Coping Strategies   Going walking   Coloring            Step 3: People and social settings that provide distraction:   Name Contact Information   Le (my sister) Number is in patient's phone   Lenka Richardson [phone]   Monica (my daughter)     Places   [place]           Step 4: People whom I can ask for help during a crisis:      Name Contact Information    Lenka Richardson [phone]      Step 5: Professionals or agencies I can contact during a crisis:      [facility]/Agency Name Phone Emergency Contact    Dr. Hutchinson [phone]       Local Emergency Department Emergency Department Phone Emergency Department Address    [phone]        Crisis Phone Numbers:   Suicide Prevention Lifeline: Call or Text  384 Crisis Text Line: Text HOME to 657-359   Please note: Some [facility] do not have a separate number for Child/Adolescent specific crisis. If your county is not listed under Child/Adolescent, please call the adult number for your county      Adult Crisis Numbers: Child/Adolescent Crisis Numbers   [facility]: [phone] [facility]: [phone]   [facility]: [phone] [facility]: [phone]   [facility]: [phone] [place]: [phone]   [facility]: [phone] [place]: [phone]   [place] [facility]: [phone]   [facility]: [phone]   [facility]: [phone]   [facility]: [phone] (daytime) [phone] (after hours, weekends, holidays)      Step 6: Making the environment safer (plan for lethal  means safety):   Patient did not identify any lethal methods: Yes     Optional: What is most important to me and worth living for?   I want to live for my daughter.     [place] Safety Plan. Rachel Chung and Dakota Lunsford. Used with permission of the authors.
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
## 2024-02-08 ENCOUNTER — OFFICE VISIT (OUTPATIENT)
Dept: FAMILY MEDICINE CLINIC | Facility: CLINIC | Age: 29
End: 2024-02-08
Payer: MEDICARE

## 2024-02-08 VITALS
HEART RATE: 101 BPM | RESPIRATION RATE: 16 BRPM | WEIGHT: 214.2 LBS | OXYGEN SATURATION: 97 % | SYSTOLIC BLOOD PRESSURE: 118 MMHG | HEIGHT: 62 IN | TEMPERATURE: 99.5 F | BODY MASS INDEX: 39.42 KG/M2 | DIASTOLIC BLOOD PRESSURE: 80 MMHG

## 2024-02-08 DIAGNOSIS — R11.0 NAUSEA: Primary | ICD-10-CM

## 2024-02-08 DIAGNOSIS — G43.009 MIGRAINE WITHOUT AURA AND WITHOUT STATUS MIGRAINOSUS, NOT INTRACTABLE: ICD-10-CM

## 2024-02-08 DIAGNOSIS — F41.9 ANXIETY AND DEPRESSION: ICD-10-CM

## 2024-02-08 DIAGNOSIS — F32.A ANXIETY AND DEPRESSION: ICD-10-CM

## 2024-02-08 PROCEDURE — 99214 OFFICE O/P EST MOD 30 MIN: CPT | Performed by: FAMILY MEDICINE

## 2024-02-08 RX ORDER — TOPIRAMATE 25 MG/1
25 TABLET ORAL 2 TIMES DAILY
Qty: 60 TABLET | Refills: 0 | Status: SHIPPED | OUTPATIENT
Start: 2024-02-08

## 2024-02-08 RX ORDER — FAMOTIDINE 20 MG/1
20 TABLET, FILM COATED ORAL DAILY
Qty: 30 TABLET | Refills: 0 | Status: SHIPPED | OUTPATIENT
Start: 2024-02-08

## 2024-02-08 NOTE — PATIENT INSTRUCTIONS
Please present for your previously scheduled appointment approximately 15 minutes prior to appointment time. If you are running late or are unable to attend your appointment, please call our [place] office at [phone] or our [place] office at [phone] if applicable to notify the office of your absence.    If you are in psychological crisis including not limited to suicidal/homicidal thoughts or thoughts of self-injury, do not hesitate to call/contact your County Crisis hotline (see below) or attend to the nearest emergency department.  [facility] Crisis: [phone]  [facility] Crisis: [phone]  [facility] Crisis: [phone]  [facility] Crisis: [phone]  [facility] Crisis: [phone]  [facility] Crisis: [phone]  [facility] Crisis: [phone]  National Suicide Prevention Hotline: 1-885.773.9663

## 2024-02-08 NOTE — PROGRESS NOTES
Name: Sherry Diaz      : 1995      MRN: [id number]  Encounter Provider: Kassy Esquivel MD  Encounter Date: 2024   Encounter department: [facility]    Assessment & Plan     Chronic nausea may be due to medications vs GI. Trial Pepcid 20 mg daily. Topamax ordered for migraine ppx. Take 25 mg daily for 1 week then 25 mg BID. Has yet to start mg and riboflavin which I encouraged her to do. F.u in 3 m    1. Nausea  -     famotidine (PEPCID) 20 mg tablet; Take 1 tablet (20 mg total) by mouth daily    2. Migraine without aura and without status migrainosus, not intractable  -     topiramate (Topamax) 25 mg tablet; Take 1 tablet (25 mg total) by mouth 2 (two) times a day    3. Anxiety and depression  -     topiramate (Topamax) 25 mg tablet; Take 1 tablet (25 mg total) by mouth 2 (two) times a day         Subjective      Seen today for follow up  At the last visit, we increased lexapro to 10 mg and suggested she see baby and me  She has felt a little better  Not seen baby and me  Did see her psychiatrist yesterday who continued her lexapro at 10   Getting a migraine every other week  Last hours. Sometimes global vs unilateral  Imitrex helps and will go away after a few hours   Still nauseous. This was worse during her pregnancy and has gotten better  Did have GERD during pregnancy  No belching or bloating   Does get full easily  No diarrhea, constipation, or abdominal pain       Review of Systems    Current Outpatient Medications on File Prior to Visit   Medication Sig   • acetaminophen (TYLENOL) 325 mg tablet Take 2 tablets (650 mg total) by mouth every 6 (six) hours as needed for mild pain for up to 8 doses   • escitalopram (LEXAPRO) 10 mg tablet Take 1 tablet (10 mg total) by mouth daily   • ibuprofen (MOTRIN) 600 mg tablet Take 1 tablet (600 mg total) by mouth every 6 (six) hours as needed for mild pain   • Magnesium 400 MG TABS Take 1 tablet (400 mg total) by mouth in the  "morning   • ondansetron (ZOFRAN) 4 mg tablet Take 1 tablet (4 mg total) by mouth every 6 (six) hours as needed for nausea or vomiting   • Riboflavin 400 MG TABS Take 1 tablet (400 mg total) by mouth in the morning   • SUMAtriptan (IMITREX) 25 mg tablet Take 2 tablets (50 mg total) by mouth once as needed for migraine for up to 1 dose       Objective     /80 (BP Location: Right arm, Patient Position: Sitting, Cuff Size: Large)   Pulse 101   Temp 99.5 °F (37.5 °C) (Tympanic)   Resp 16   Ht 5' 2\" (1.575 m)   Wt 97.2 kg (214 lb 3.2 oz)   SpO2 97%   BMI 39.18 kg/m²     Physical Exam  Constitutional:       General: She is not in acute distress.     Appearance: Normal appearance. She is not ill-appearing or toxic-appearing.   HENT:      Head: Normocephalic and atraumatic.   Eyes:      Extraocular Movements: Extraocular movements intact.   Cardiovascular:      Heart sounds: No murmur heard.  Pulmonary:      Effort: Pulmonary effort is normal. No respiratory distress.      Breath sounds: Normal breath sounds. No stridor. No wheezing or rales.   Abdominal:      General: There is no distension.      Palpations: Abdomen is soft. There is no mass.      Tenderness: There is no abdominal tenderness. There is no guarding or rebound.      Hernia: No hernia is present.   Skin:     General: Skin is warm.   Neurological:      Mental Status: She is alert and oriented to person, place, and time.   Psychiatric:         Behavior: Behavior normal.     Kassy Esquivel MD    "

## 2024-02-22 PROBLEM — Z3A.40 40 WEEKS GESTATION OF PREGNANCY: Status: RESOLVED | Noted: 2022-09-23 | Resolved: 2024-02-22

## 2024-04-17 ENCOUNTER — TELEPHONE (OUTPATIENT)
Dept: PSYCHIATRY | Facility: CLINIC | Age: 29
End: 2024-04-17

## 2024-04-17 NOTE — TELEPHONE ENCOUNTER
CW contacted the office, requesting to reschedule an appt scheduled for 4/17/24 at 3 pm. The writer transferred the call to the resident department for assistance.

## 2024-04-30 NOTE — OB LABOR/OXYTOCIN SAFETY PROGRESS
Labor Progress Note - Ailin Yusuf 32 y o  female MRN: [id number]    Unit/Bed#: -01 Encounter: [id number]       Contraction Frequency (minutes): 1 5-2 5  Contraction Quality: Mild  Tachysystole: No   Cervical Dilation: 6        Cervical Effacement: 80  Fetal Station: -2  Baseline Rate: 135 bpm  Fetal Heart Rate: (P) 75 BPM  FHR Category: (P) Category II               Vital Signs:   Vitals:    05/11/23 0100   BP: 112/62   Pulse: 73   Resp:    Temp:    SpO2:        Notes/comments: Patient had period of recurrent decelerations with spontaneous resolution to baseline and moderate variability  Decelerations resolved with repositioning, oxygen administration, and fluids bolused  Continue to monitor and start pitocin if FHT remains Cat 1 for 30 min       D/w Dr Silvia Ramirez MD 5/11/2023 1:18 AM Detail Level: Detailed Depth Of Biopsy: dermis Was A Bandage Applied: Yes Size Of Lesion In Cm: 0 Biopsy Type: H and E Biopsy Method: 10 blade Anesthesia Type: 1% lidocaine with epinephrine Anesthesia Volume In Cc: 0.5 Hemostasis: Aluminum Chloride Wound Care: Petrolatum Dressing: bandage Destruction After The Procedure: No Type Of Destruction Used: Curettage Curettage Text: The wound bed was treated with curettage after the biopsy was performed. Cryotherapy Text: The wound bed was treated with cryotherapy after the biopsy was performed. Electrodesiccation Text: The wound bed was treated with electrodesiccation after the biopsy was performed. Electrodesiccation And Curettage Text: The wound bed was treated with electrodesiccation and curettage after the biopsy was performed. Silver Nitrate Text: The wound bed was treated with silver nitrate after the biopsy was performed. Lab: -92 Consent: Written consent was obtained and risks were reviewed including but not limited to scarring, infection, bleeding, scabbing, incomplete removal, nerve damage and allergy to anesthesia. Post-Care Instructions: I reviewed with the patient in detail post-care instructions. Patient is to keep the biopsy site dry overnight, and then apply Vaseline twice daily until healed. Notification Instructions: Patient will be notified of biopsy results. However, patient instructed to call the office if not contacted within 2 weeks. Billing Type: Third-Party Bill Information: Selecting Yes will display possible errors in your note based on the variables you have selected. This validation is only offered as a suggestion for you. PLEASE NOTE THAT THE VALIDATION TEXT WILL BE REMOVED WHEN YOU FINALIZE YOUR NOTE. IF YOU WANT TO FAX A PRELIMINARY NOTE YOU WILL NEED TO TOGGLE THIS TO 'NO' IF YOU DO NOT WANT IT IN YOUR FAXED NOTE.

## 2024-05-22 ENCOUNTER — OFFICE VISIT (OUTPATIENT)
Dept: FAMILY MEDICINE CLINIC | Facility: CLINIC | Age: 29
End: 2024-05-22
Payer: MEDICARE

## 2024-05-22 VITALS
HEART RATE: 113 BPM | BODY MASS INDEX: 40.3 KG/M2 | TEMPERATURE: 98 F | HEIGHT: 62 IN | SYSTOLIC BLOOD PRESSURE: 128 MMHG | OXYGEN SATURATION: 98 % | RESPIRATION RATE: 16 BRPM | WEIGHT: 219 LBS | DIASTOLIC BLOOD PRESSURE: 100 MMHG

## 2024-05-22 DIAGNOSIS — E78.2 MODERATE MIXED HYPERLIPIDEMIA NOT REQUIRING STATIN THERAPY: ICD-10-CM

## 2024-05-22 DIAGNOSIS — G43.009 MIGRAINE WITHOUT AURA AND WITHOUT STATUS MIGRAINOSUS, NOT INTRACTABLE: ICD-10-CM

## 2024-05-22 DIAGNOSIS — F32.A ANXIETY AND DEPRESSION: ICD-10-CM

## 2024-05-22 DIAGNOSIS — O99.013 ANEMIA OF MOTHER IN PREGNANCY, ANTEPARTUM, THIRD TRIMESTER: ICD-10-CM

## 2024-05-22 DIAGNOSIS — F41.9 ANXIETY AND DEPRESSION: ICD-10-CM

## 2024-05-22 DIAGNOSIS — Z00.00 MEDICARE ANNUAL WELLNESS VISIT, SUBSEQUENT: Primary | ICD-10-CM

## 2024-05-22 PROCEDURE — G0439 PPPS, SUBSEQ VISIT: HCPCS | Performed by: FAMILY MEDICINE

## 2024-05-22 NOTE — PATIENT INSTRUCTIONS
Medicare Preventive Visit Patient Instructions  Thank you for completing your Welcome to Medicare Visit or Medicare Annual Wellness Visit today. Your next wellness visit will be due in one year (5/23/2025).  The screening/preventive services that you may require over the next 5-10 years are detailed below. Some tests may not apply to you based off risk factors and/or age. Screening tests ordered at today's visit but not completed yet may show as past due. Also, please note that scanned in results may not display below.  Preventive Screenings:  Service Recommendations Previous Testing/Comments   Colorectal Cancer Screening  * Colonoscopy    * Fecal Occult Blood Test (FOBT)/Fecal Immunochemical Test (FIT)  * Fecal DNA/Cologuard Test  * Flexible Sigmoidoscopy Age: 45-75 years old   Colonoscopy: every 10 years (may be performed more frequently if at higher risk)  OR  FOBT/FIT: every 1 year  OR  Cologuard: every 3 years  OR  Sigmoidoscopy: every 5 years  Screening may be recommended earlier than age 45 if at higher risk for colorectal cancer. Also, an individualized decision between you and your healthcare provider will decide whether screening between the ages of 76-85 would be appropriate. Colonoscopy: Not on file  FOBT/FIT: Not on file  Cologuard: Not on file  Sigmoidoscopy: Not on file          Breast Cancer Screening Age: 40+ years old  Frequency: every 1-2 years  Not required if history of left and right mastectomy Mammogram: Not on file    Screening Not Indicated   Cervical Cancer Screening Between the ages of 21-29, pap smear recommended once every 3 years.   Between the ages of 30-65, can perform pap smear with HPV co-testing every 5 years.   Recommendations may differ for women with a history of total hysterectomy, cervical cancer, or abnormal pap smears in past. Pap Smear: 10/24/2022    Screening Current   Hepatitis C Screening Once for adults born between 1945 and 1965  More frequently in patients at high  risk for Hepatitis C Hep C Antibody: 10/24/2022    Screening Current   Diabetes Screening 1-2 times per year if you're at risk for diabetes or have pre-diabetes Fasting glucose: 79 mg/dL (11/4/2022)  A1C: No results in last 5 years (No results in last 5 years)      Cholesterol Screening Once every 5 years if you don't have a lipid disorder. May order more often based on risk factors. Lipid panel: 12/20/2022    Screening Not Indicated  History Lipid Disorder     Other Preventive Screenings Covered by Medicare:  Abdominal Aortic Aneurysm (AAA) Screening: covered once if your at risk. You're considered to be at risk if you have a family history of AAA.  Lung Cancer Screening: covers low dose CT scan once per year if you meet all of the following conditions: (1) Age 55-77; (2) No signs or symptoms of lung cancer; (3) Current smoker or have quit smoking within the last 15 years; (4) You have a tobacco smoking history of at least 20 pack years (packs per day multiplied by number of years you smoked); (5) You get a written order from a healthcare provider.  Glaucoma Screening: covered annually if you're considered high risk: (1) You have diabetes OR (2) Family history of glaucoma OR (3)  aged 50 and older OR (4)  American aged 65 and older  Osteoporosis Screening: covered every 2 years if you meet one of the following conditions: (1) You're estrogen deficient and at risk for osteoporosis based off medical history and other findings; (2) Have a vertebral abnormality; (3) On glucocorticoid therapy for more than 3 months; (4) Have primary hyperparathyroidism; (5) On osteoporosis medications and need to assess response to drug therapy.   Last bone density test (DXA Scan): Not on file.  HIV Screening: covered annually if you're between the age of 15-65. Also covered annually if you are younger than 15 and older than 65 with risk factors for HIV infection. For pregnant patients, it is covered up to 3  times per pregnancy.    Immunizations:  Immunization Recommendations   Influenza Vaccine Annual influenza vaccination during flu season is recommended for all persons aged >= 6 months who do not have contraindications   Pneumococcal Vaccine   * Pneumococcal conjugate vaccine = PCV13 (Prevnar 13), PCV15 (Vaxneuvance), PCV20 (Prevnar 20)  * Pneumococcal polysaccharide vaccine = PPSV23 (Pneumovax) Adults 19-63 yo with certain risk factors or if 65+ yo  If never received any pneumonia vaccine: recommend Prevnar 20 (PCV20)  Give PCV20 if previously received 1 dose of PCV13 or PPSV23   Hepatitis B Vaccine 3 dose series if at intermediate or high risk (ex: diabetes, end stage renal disease, liver disease)   Respiratory syncytial virus (RSV) Vaccine - COVERED BY MEDICARE PART D  * RSVPreF3 (Arexvy) CDC recommends that adults 60 years of age and older may receive a single dose of RSV vaccine using shared clinical decision-making (SCDM)   Tetanus (Td) Vaccine - COST NOT COVERED BY MEDICARE PART B Following completion of primary series, a booster dose should be given every 10 years to maintain immunity against tetanus. Td may also be given as tetanus wound prophylaxis.   Tdap Vaccine - COST NOT COVERED BY MEDICARE PART B Recommended at least once for all adults. For pregnant patients, recommended with each pregnancy.   Shingles Vaccine (Shingrix) - COST NOT COVERED BY MEDICARE PART B  2 shot series recommended in those 19 years and older who have or will have weakened immune systems or those 50 years and older     Health Maintenance Due:      Topic Date Due   • Cervical Cancer Screening  10/24/2025   • HIV Screening  Completed   • Hepatitis C Screening  Completed     Immunizations Due:      Topic Date Due   • COVID-19 Vaccine (1 - 2023-24 season) Never done     Advance Directives   What are advance directives?  Advance directives are legal documents that state your wishes and plans for medical care. These plans are made ahead  of time in case you lose your ability to make decisions for yourself. Advance directives can apply to any medical decision, such as the treatments you want, and if you want to donate organs.   What are the types of advance directives?  There are many types of advance directives, and each state has rules about how to use them. You may choose a combination of any of the following:  Living will:  This is a written record of the treatment you want. You can also choose which treatments you do not want, which to limit, and which to stop at a certain time. This includes surgery, medicine, IV fluid, and tube feedings.   Durable power of  for healthcare (DPAHC):  This is a written record that states who you want to make healthcare choices for you when you are unable to make them for yourself. This person, called a proxy, is usually a family member or a friend. You may choose more than 1 proxy.  Do not resuscitate (DNR) order:  A DNR order is used in case your heart stops beating or you stop breathing. It is a request not to have certain forms of treatment, such as CPR. A DNR order may be included in other types of advance directives.  Medical directive:  This covers the care that you want if you are in a coma, near death, or unable to make decisions for yourself. You can list the treatments you want for each condition. Treatment may include pain medicine, surgery, blood transfusions, dialysis, IV or tube feedings, and a ventilator (breathing machine).  Values history:  This document has questions about your views, beliefs, and how you feel and think about life. This information can help others choose the care that you would choose.  Why are advance directives important?  An advance directive helps you control your care. Although spoken wishes may be used, it is better to have your wishes written down. Spoken wishes can be misunderstood, or not followed. Treatments may be given even if you do not want them. An advance  directive may make it easier for your family to make difficult choices about your care.   Weight Management   Why it is important to manage your weight:  Being overweight increases your risk of health conditions such as heart disease, high blood pressure, type 2 diabetes, and certain types of cancer. It can also increase your risk for osteoarthritis, sleep apnea, and other respiratory problems. Aim for a slow, steady weight loss. Even a small amount of weight loss can lower your risk of health problems.  How to lose weight safely:  A safe and healthy way to lose weight is to eat fewer calories and get regular exercise. You can lose up about 1 pound a week by decreasing the number of calories you eat by 500 calories each day.   Healthy meal plan for weight management:  A healthy meal plan includes a variety of foods, contains fewer calories, and helps you stay healthy. A healthy meal plan includes the following:  Eat whole-grain foods more often.  A healthy meal plan should contain fiber. Fiber is the part of grains, fruits, and vegetables that is not broken down by your body. Whole-grain foods are healthy and provide extra fiber in your diet. Some examples of whole-grain foods are whole-wheat breads and pastas, oatmeal, brown rice, and bulgur.  Eat a variety of vegetables every day.  Include dark, leafy greens such as spinach, kale, adrienne greens, and mustard greens. Eat yellow and orange vegetables such as carrots, sweet potatoes, and winter squash.   Eat a variety of fruits every day.  Choose fresh or canned fruit (canned in its own juice or light syrup) instead of juice. Fruit juice has very little or no fiber.  Eat low-fat dairy foods.  Drink fat-free (skim) milk or 1% milk. Eat fat-free yogurt and low-fat cottage cheese. Try low-fat cheeses such as mozzarella and other reduced-fat cheeses.  Choose meat and other protein foods that are low in fat.  Choose beans or other legumes such as split peas or lentils.  Choose fish, skinless poultry (chicken or turkey), or lean cuts of red meat (beef or pork). Before you cook meat or poultry, cut off any visible fat.   Use less fat and oil.  Try baking foods instead of frying them. Add less fat, such as margarine, sour cream, regular salad dressing and mayonnaise to foods. Eat fewer high-fat foods. Some examples of high-fat foods include french fries, doughnuts, ice cream, and cakes.  Eat fewer sweets.  Limit foods and drinks that are high in sugar. This includes candy, cookies, regular soda, and sweetened drinks.  Exercise:  Exercise at least 30 minutes per day on most days of the week. Some examples of exercise include walking, biking, dancing, and swimming. You can also fit in more physical activity by taking the stairs instead of the elevator or parking farther away from stores. Ask your healthcare provider about the best exercise plan for you.      © Copyright [employer] 2018 Information is for End User's use only and may not be sold, redistributed or otherwise used for commercial purposes. All illustrations and images included in CareNotes® are the copyrighted property of [employer] or [employer]

## 2024-05-22 NOTE — PROGRESS NOTES
Ambulatory Visit  Name: Sherry Diaz      : 1995      MRN: [id number]  Encounter Provider: Kassy Esquivel MD  Encounter Date: 2024   Encounter department: [facility]    Assessment & Plan   1. Medicare annual wellness visit, subsequent  Comments:  FBW ordered  2. Migraine without aura and without status migrainosus, not intractable  Comments:  Prescribed topomax at the last visit but is not taking. Headaches has subsided.  Assessment & Plan:  Have improved  3. Anxiety and depression  Comments:  Mostly situational. In a relationship that has not been ideal. Admitted to not taking lexapro or topomax. States she did not feel comfortable taking it. Has appt with psych tomorrow but asked her SW to cancel. Will continue to support her but also respect if she chooses not to be on medications   4. Moderate mixed hyperlipidemia not requiring statin therapy  Comments:  FBW ordered  Orders:  -     Lipid panel; Future  -     Comprehensive metabolic panel; Future  5. Anemia of mother in pregnancy, antepartum, third trimester  Comments:  Anemic during her pregnancy. Hb dropped to 7.8 at some point. Hasnt had repeat labs since. Will recheck CBC and iron  Orders:  -     CBC and differential; Future  -     Iron Panel (Includes Ferritin, Iron Sat%, Iron, and TIBC); Future       Preventive health issues were discussed with patient, and age appropriate screening tests were ordered as noted in patient's After Visit Summary. Personalized health advice and appropriate referrals for health education or preventive services given if needed, as noted in patient's After Visit Summary.    History of Present Illness     Here for AWV  Shared that her child's father had a DUI and his license is being taken away. This has made it hard for them to see each other. She feels no one is supporting them being together. She is not taking any of her medications. She has an appt with psych tomorrow     Patient Care  Team:  Kassy Esquivel MD as PCP - General (Family Medicine)  Teressa Landeros MD (Obstetrics and Gynecology)  Loretta Fernandez MD (Obstetrics and Gynecology)  Dolly Parada MD (Obstetrics and Gynecology)  Nora Brooks MD (Obstetrics and Gynecology)  Elicia Cantu MD (Obstetrics and Gynecology)  Eileen Ramsey MD (Obstetrics and Gynecology)  LIZ Berry (Obstetrics and Gynecology)  Liana Mattson PA-C as Physician Assistant (Physician Assistant)  Liana Brown PA-C as Physician Assistant (Physician Assistant)  LIZ Augustin as Nurse Practitioner (Obstetrics and Gynecology)  LIZ Howe as Nurse Practitioner (Nurse Practitioner)  LIZ Diaz (Obstetrics and Gynecology)    Review of Systems  Medical History Reviewed by provider this encounter:       Annual Wellness Visit Questionnaire   Sherry is here for her Subsequent Wellness visit.     Health Risk Assessment:   Patient rates overall health as excellent. Patient feels that their physical health rating is much better. Patient is satisfied with their life. Eyesight was rated as same. Hearing was rated as same. Patient feels that their emotional and mental health rating is slightly better. Patients states they are never, rarely angry. Patient states they are sometimes unusually tired/fatigued. Pain experienced in the last 7 days has been none. Patient states that she has experienced no weight loss or gain in last 6 months.     Fall Risk Screening:   In the past year, patient has experienced: no history of falling in past year      Urinary Incontinence Screening:   Patient has not leaked urine accidently in the last six months.     Home Safety:  Patient does not have trouble with stairs inside or outside of their home. Patient has working smoke alarms and has working carbon monoxide detector. Home safety hazards include: none.     Medications:   Patient is  currently taking over-the-counter supplements. OTC medications include: see medication list. Patient is able to manage medications.     Activities of Daily Living (ADLs)/Instrumental Activities of Daily Living (IADLs):   Walk and transfer into and out of bed and chair?: Yes  Dress and groom yourself?: Yes    Bathe or shower yourself?: Yes    Feed yourself? Yes  Do your laundry/housekeeping?: Yes  Manage your money, pay your bills and track your expenses?: No  Make your own meals?: Yes    Do your own shopping?: Yes    Previous Hospitalizations:   Any hospitalizations or ED visits within the last 12 months?: No      Advance Care Planning:   Living will: No    Durable POA for healthcare: No    Advanced directive: No      PREVENTIVE SCREENINGS      Cardiovascular Screening:    General: Screening Not Indicated and History Lipid Disorder      Breast Cancer Screening:     General: Screening Not Indicated      Cervical Cancer Screening:    General: Screening Current      Lung Cancer Screening:     General: Screening Not Indicated      Hepatitis C Screening:    General: Screening Current    Screening, Brief Intervention, and Referral to Treatment (SBIRT)    Screening      AUDIT-C Screenin) How often did you have a drink containing alcohol in the past year? monthly or less  2) How many drinks did you have on a typical day when you were drinking in the past year? 1 to 2  3) How often did you have 6 or more drinks on one occasion in the past year? never    AUDIT-C Score: 1  Interpretation: Score 0-2 (female): Negative screen for alcohol misuse    Single Item Drug Screening:  How often have you used an illegal drug (including marijuana) or a prescription medication for non-medical reasons in the past year? never    Single Item Drug Screen Score: 0  Interpretation: Negative screen for possible drug use disorder    Social Determinants of Health     Financial Resource Strain: Low Risk  (2022)    Overall Financial Resource  "Strain (CARDIA)    • Difficulty of Paying Living Expenses: Not hard at all   Food Insecurity: No Food Insecurity (5/22/2024)    Hunger Vital Sign    • Worried About Running Out of Food in the Last Year: Never true    • Ran Out of Food in the Last Year: Never true   Transportation Needs: No Transportation Needs (5/22/2024)    PRAPARE - Transportation    • Lack of Transportation (Medical): No    • Lack of Transportation (Non-Medical): No   Housing Stability: Unknown (5/22/2024)    Housing Stability Vital Sign    • Unable to Pay for Housing in the Last Year: No    • Homeless in the Last Year: No   Utilities: Not At Risk (5/22/2024)    [facility] Utilities    • Threatened with loss of utilities: No     No results found.    Objective     /100 (BP Location: Left arm, Patient Position: Sitting, Cuff Size: Standard)   Pulse (!) 113   Temp 98 °F (36.7 °C) (Tympanic)   Resp 16   Ht 5' 2\" (1.575 m)   Wt 99.3 kg (219 lb)   SpO2 98%   BMI 40.06 kg/m²     Physical Exam  Constitutional:       General: She is not in acute distress.     Appearance: Normal appearance. She is not ill-appearing or toxic-appearing.   HENT:      Head: Normocephalic and atraumatic.   Eyes:      Extraocular Movements: Extraocular movements intact.   Cardiovascular:      Rate and Rhythm: Normal rate and regular rhythm.      Heart sounds: No murmur heard.  Pulmonary:      Effort: Pulmonary effort is normal. No respiratory distress.      Breath sounds: Normal breath sounds. No stridor. No wheezing, rhonchi or rales.   Abdominal:      General: There is no distension.      Palpations: Abdomen is soft. There is no mass.      Tenderness: There is no abdominal tenderness. There is no guarding or rebound.      Hernia: No hernia is present.   Musculoskeletal:      Right lower leg: No edema.      Left lower leg: No edema.   Neurological:      Mental Status: She is alert.   Psychiatric:         Mood and Affect: Mood is depressed. Affect is tearful.         " Speech: Speech is delayed.         Behavior: Behavior is slowed and withdrawn.

## 2024-05-23 ENCOUNTER — OFFICE VISIT (OUTPATIENT)
Dept: PSYCHIATRY | Facility: CLINIC | Age: 29
End: 2024-05-23

## 2024-05-23 NOTE — PSYCH
Sherry Diaz did not attend today's (05/23/24) appointment and did not inform clerical staff of their potential absence on days prior to the evaluation. This writer waited 20 minutes prior to declaring no show.    At this time, Sherry (STANISLAW) has not been seen in the clinic in an extended period of time since February 2024. She cancelled her appointment (same day) on 4/17/24. She rescheduled her appointment 5/2/24 to today 5/23/24, which she no showed.    The following italicized information is copied from her PCP Kassy Esquivel MD's note 5/22/24:   Anxiety and depression  Comments:  Mostly situational. In a relationship that has not been ideal. Admitted to not taking lexapro or topomax. States she did not feel comfortable taking it. Has appt with psych tomorrow but asked her SW to cancel. Will continue to support her but also respect if she chooses not to be on medications     At this time, there is ongoing concerns about the patient's adherence to treatment plan. At this time discussions will be held regarding discharging the patient from the resident clinic in light of the aforementioned factors.    Juan A Hutchinson MD

## 2024-05-24 ENCOUNTER — DOCUMENTATION (OUTPATIENT)
Dept: PSYCHIATRY | Facility: CLINIC | Age: 29
End: 2024-05-24

## 2024-05-25 NOTE — PSYCH
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    Lethality Risk at the time of discharge from clinic: LOW.     At the time of the most recent psychiatric assessment, Sherry was future-oriented, forward-thinking, and demonstrated ability to act in a self-preserving manner as evidenced by volitionally presenting to the clinic, seeking treatment. To mitigate future risk, patient should adhere to treatment recommendations, avoid alcohol/illicit substance use, utilize community-based resources and familiar support, and prioritize mental health treatment.     Aftercare Recommendations:     Follow up with family physician for psychiatric issues.    Discharge Medications:     Current Outpatient Medications:     acetaminophen (TYLENOL) 325 mg tablet, Take 2 tablets (650 mg total) by mouth every 6 (six) hours as needed for mild pain for up to 8 doses, Disp: 16 tablet, Rfl: 0    ibuprofen (MOTRIN) 600 mg tablet, Take 1 tablet (600 mg total) by mouth every 6 (six) hours as needed for mild pain, Disp: 30 tablet, Rfl: 0    ondansetron (ZOFRAN) 4 mg tablet, Take 1 tablet (4 mg total) by mouth every 6 (six) hours as needed for nausea or vomiting, Disp: 90 tablet, Rfl: 0    SUMAtriptan (IMITREX) 25 mg tablet, Take 2  tablets (50 mg total) by mouth once as needed for migraine for up to 1 dose, Disp: 60 tablet, Rfl: 2      Juan A Hutchinson MD 5/24/24

## 2024-05-28 ENCOUNTER — TELEPHONE (OUTPATIENT)
Dept: PSYCHIATRY | Facility: CLINIC | Age: 29
End: 2024-05-28

## 2024-05-28 NOTE — TELEPHONE ENCOUNTER
DISCHARGE LETTER for Romina (certified and regular) placed in outgoing mail on 05/28/24.    Article #:  [id number]    [facility]  [facility] [address] [place] [address]
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
## 2024-08-28 ENCOUNTER — TELEPHONE (OUTPATIENT)
Age: 29
End: 2024-08-28
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
## 2024-09-11 ENCOUNTER — OFFICE VISIT (OUTPATIENT)
Dept: FAMILY MEDICINE CLINIC | Facility: CLINIC | Age: 29
End: 2024-09-11
Payer: MEDICARE

## 2024-09-11 VITALS
TEMPERATURE: 99.2 F | HEART RATE: 84 BPM | OXYGEN SATURATION: 99 % | SYSTOLIC BLOOD PRESSURE: 130 MMHG | DIASTOLIC BLOOD PRESSURE: 80 MMHG | WEIGHT: 222 LBS | RESPIRATION RATE: 17 BRPM | BODY MASS INDEX: 40.85 KG/M2 | HEIGHT: 62 IN

## 2024-09-11 DIAGNOSIS — F70 MILD INTELLECTUAL DISABILITY: ICD-10-CM

## 2024-09-11 DIAGNOSIS — F41.9 ANXIETY AND DEPRESSION: Primary | ICD-10-CM

## 2024-09-11 DIAGNOSIS — Z63.8: ICD-10-CM

## 2024-09-11 DIAGNOSIS — F32.A ANXIETY AND DEPRESSION: Primary | ICD-10-CM

## 2024-09-11 PROCEDURE — G2211 COMPLEX E/M VISIT ADD ON: HCPCS | Performed by: FAMILY MEDICINE

## 2024-09-11 PROCEDURE — 99214 OFFICE O/P EST MOD 30 MIN: CPT | Performed by: FAMILY MEDICINE

## 2024-09-11 SDOH — SOCIAL STABILITY - SOCIAL INSECURITY: OTHER SPECIFIED PROBLEMS RELATED TO PRIMARY SUPPORT GROUP: Z63.8

## 2024-09-11 NOTE — LETTER
September 11, 2024     Patient: Sherry Diaz  YOB: 1995  Date of Visit: 9/11/2024  SSN#       To Whom it May Concern:    Sherry Diaz is under my professional care. Sherry was seen in my office on 9/11/2024. This letter is for the social security department to help identify Sherry for the purpose of obtaining a social security card and state identification card.     If you have any questions or concerns, please don't hesitate to call.         Sincerely,          Kassy Esquivel MD        CC: No Recipients
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
## 2025-01-17 ENCOUNTER — OFFICE VISIT (OUTPATIENT)
Dept: FAMILY MEDICINE CLINIC | Facility: CLINIC | Age: 30
End: 2025-01-17
Payer: MEDICARE

## 2025-01-17 VITALS
TEMPERATURE: 97.7 F | HEART RATE: 79 BPM | OXYGEN SATURATION: 98 % | RESPIRATION RATE: 16 BRPM | DIASTOLIC BLOOD PRESSURE: 88 MMHG | BODY MASS INDEX: 39.79 KG/M2 | HEIGHT: 62 IN | SYSTOLIC BLOOD PRESSURE: 138 MMHG | WEIGHT: 216.2 LBS

## 2025-01-17 DIAGNOSIS — J01.40 ACUTE NON-RECURRENT PANSINUSITIS: Primary | ICD-10-CM

## 2025-01-17 DIAGNOSIS — T36.95XA ANTIBIOTIC-INDUCED YEAST INFECTION: ICD-10-CM

## 2025-01-17 DIAGNOSIS — B37.9 ANTIBIOTIC-INDUCED YEAST INFECTION: ICD-10-CM

## 2025-01-17 PROCEDURE — G2211 COMPLEX E/M VISIT ADD ON: HCPCS | Performed by: FAMILY MEDICINE

## 2025-01-17 PROCEDURE — 99213 OFFICE O/P EST LOW 20 MIN: CPT | Performed by: FAMILY MEDICINE

## 2025-01-17 RX ORDER — FLUCONAZOLE 150 MG/1
150 TABLET ORAL ONCE
Qty: 1 TABLET | Refills: 1 | Status: SHIPPED | OUTPATIENT
Start: 2025-01-17 | End: 2025-01-17

## 2025-01-17 RX ORDER — FLUTICASONE PROPIONATE 50 MCG
1 SPRAY, SUSPENSION (ML) NASAL DAILY
Qty: 9.9 ML | Refills: 0 | Status: SHIPPED | OUTPATIENT
Start: 2025-01-17

## 2025-01-17 NOTE — PROGRESS NOTES
"Name: Sherry Diaz      : 1995      MRN: [id number]  Encounter Provider: Kassy Esquivel MD  Encounter Date: 2025   Encounter department: [facility]  :  Assessment & Plan  Acute non-recurrent pansinusitis  Presents with nasal congestion and cough. Symptoms started 7 days ago. Afebrile and NAD. Given the duration of symptoms, would be eligible for abx. First, I would like her to take Flonase dailly for 2 days and if not improving, may start Augmentin BID x 7 days. Diflucan prescribed given her history of recurrent yeast infection with abx   Orders:    amoxicillin-clavulanate (AUGMENTIN) 875-125 mg per tablet; Take 1 tablet by mouth every 12 (twelve) hours for 7 days    fluticasone (FLONASE) 50 mcg/act nasal spray; 1 spray into each nostril daily    Antibiotic-induced yeast infection    Orders:    fluconazole (DIFLUCAN) 150 mg tablet; Take 1 tablet (150 mg total) by mouth once for 1 dose           History of Present Illness     Presents with cold symptoms  Started last week   Reports nasal congestion, sore throat, and cough   Nasal discharge is discolored  Denies chest tightness, wheezing, SOB, or ear pain   Daughter also sick   Living in [facility]   Unsure if she will continue to live there  Did plans to establish with a provider at [facility]     Cough  Associated symptoms include postnasal drip and a sore throat. Pertinent negatives include no ear pain, fever or myalgias.   Review of Systems   Constitutional:  Negative for fatigue and fever.   HENT:  Positive for congestion, postnasal drip, sinus pressure and sore throat. Negative for ear pain.    Respiratory:  Positive for cough.    Cardiovascular: Negative.    Gastrointestinal: Negative.    Musculoskeletal:  Negative for myalgias.       Objective   /88 (BP Location: Left arm, Patient Position: Sitting, Cuff Size: Standard)   Pulse 79   Temp 97.7 °F (36.5 °C) (Tympanic)   Resp 16   Ht 5' 2\" (1.575 m)   Wt 98.1 " kg (216 lb 3.2 oz)   SpO2 98%   BMI 39.54 kg/m²      Physical Exam  Constitutional:       General: She is not in acute distress.     Appearance: Normal appearance. She is not ill-appearing or toxic-appearing.   HENT:      Head: Normocephalic and atraumatic.      Right Ear: Tympanic membrane normal.      Left Ear: Tympanic membrane normal.      Nose: Congestion present.      Right Sinus: No maxillary sinus tenderness or frontal sinus tenderness.      Left Sinus: No maxillary sinus tenderness or frontal sinus tenderness.      Mouth/Throat:      Mouth: Mucous membranes are moist.   Eyes:      General:         Right eye: No discharge.         Left eye: No discharge.      Extraocular Movements: Extraocular movements intact.   Cardiovascular:      Rate and Rhythm: Normal rate and regular rhythm.      Heart sounds: No murmur heard.  Pulmonary:      Effort: Pulmonary effort is normal. No respiratory distress.      Breath sounds: Normal breath sounds. No stridor. No wheezing, rhonchi or rales.   Lymphadenopathy:      Cervical: Cervical adenopathy (right posterior cervical) present.   Skin:     General: Skin is warm.   Neurological:      Mental Status: She is alert and oriented to person, place, and time.   Psychiatric:         Behavior: Behavior normal.

## 2025-04-16 ENCOUNTER — TELEPHONE (OUTPATIENT)
Age: 30
End: 2025-04-16

## 2025-05-15 ENCOUNTER — TELEPHONE (OUTPATIENT)
Dept: FAMILY MEDICINE CLINIC | Facility: CLINIC | Age: 30
End: 2025-05-15
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
## 2025-06-03 ENCOUNTER — OFFICE VISIT (OUTPATIENT)
Dept: FAMILY MEDICINE CLINIC | Facility: CLINIC | Age: 30
End: 2025-06-03
Payer: MEDICARE

## 2025-06-03 VITALS
HEIGHT: 62 IN | TEMPERATURE: 98.1 F | OXYGEN SATURATION: 99 % | RESPIRATION RATE: 17 BRPM | WEIGHT: 226 LBS | HEART RATE: 88 BPM | BODY MASS INDEX: 41.59 KG/M2 | DIASTOLIC BLOOD PRESSURE: 98 MMHG | SYSTOLIC BLOOD PRESSURE: 140 MMHG

## 2025-06-03 DIAGNOSIS — N64.4 BREAST TENDERNESS IN FEMALE: Primary | ICD-10-CM

## 2025-06-03 LAB — SL AMB POCT URINE HCG: NEGATIVE

## 2025-06-03 PROCEDURE — 81025 URINE PREGNANCY TEST: CPT | Performed by: NURSE PRACTITIONER

## 2025-06-03 PROCEDURE — 99213 OFFICE O/P EST LOW 20 MIN: CPT | Performed by: NURSE PRACTITIONER

## 2025-06-03 PROCEDURE — G2211 COMPLEX E/M VISIT ADD ON: HCPCS | Performed by: NURSE PRACTITIONER

## 2025-06-03 NOTE — PROGRESS NOTES
"Name: Sherry Diaz      : 1995      MRN: [id number]  Encounter Provider: LIZ Hogue  Encounter Date: 6/3/2025   Encounter department: [facility]  :  Assessment & Plan  Breast tenderness in female  Negative urine hcg.  Reassurance provided that breast pain is not typically a presenting symptoms of cancer, espcially bilateral and generalized breast pain.  No abnormalities on her exam.  Recommend topical nsaids, vit E oil.  May be hormonal as her period is overdue.  Scheduled to see her pcp later this month.  Call with any new concerns.   Orders:    POCT urine HCG           History of Present Illness   Pt is a 29 y.o. female who is seen today for evaluation of chest pain.  Past medical history of migraines, GERD, major depression, PTSD, anxiety, FAS, obesity, mild intellectual disability.  She says she started a couple of days ago with symptoms of achy pain in both of her breasts.  No other breast changes.  No rash, discharge, skin changes.  Nothing seems to make symptoms better or worse but having her bra on is uncomfortable.  LMP was in April, she has nexplanon in for 2 years.  Period is typically regular.  Denies fever, chills, abdominal pain, fatigue.  Appetite is normal.  She denies sob, palpitations.      Review of Systems   Constitutional:  Negative for appetite change, chills, fatigue and fever.   Respiratory:  Negative for shortness of breath.    Cardiovascular:  Negative for chest pain and palpitations.   Genitourinary:         Breast tenderness       Objective   /98 (BP Location: Left arm, Patient Position: Sitting, Cuff Size: Standard)   Pulse 88   Temp 98.1 °F (36.7 °C) (Tympanic)   Resp 17   Ht 5' 2\" (1.575 m)   Wt 103 kg (226 lb)   SpO2 99%   BMI 41.34 kg/m²      Physical Exam  Vitals reviewed.   Constitutional:       General: She is awake. She is not in acute distress.     Appearance: Normal appearance. She is well-developed and " well-groomed. She is not ill-appearing.     Eyes:      General: Lids are normal.      Conjunctiva/sclera: Conjunctivae normal.       Cardiovascular:      Rate and Rhythm: Normal rate and regular rhythm.      Heart sounds: Normal heart sounds. No murmur heard.  Pulmonary:      Effort: Pulmonary effort is normal. No tachypnea, accessory muscle usage or respiratory distress.      Breath sounds: Normal breath sounds.   Chest:   Breasts:     Right: Inverted nipple and tenderness present. No swelling, bleeding, mass, nipple discharge or skin change.      Left: Inverted nipple and tenderness present. No swelling, bleeding, mass, nipple discharge or skin change.   Lymphadenopathy:      Upper Body:      Right upper body: No supraclavicular, axillary or pectoral adenopathy.      Left upper body: No supraclavicular, axillary or pectoral adenopathy.     Neurological:      Mental Status: She is alert and oriented to person, place, and time.     Psychiatric:         Attention and Perception: Attention normal.         Mood and Affect: Mood normal.         Speech: Speech normal.         Behavior: Behavior normal. Behavior is cooperative.         Thought Content: Thought content normal.         Cognition and Memory: Cognition normal.         Judgment: Judgment normal.

## 2025-06-23 ENCOUNTER — OFFICE VISIT (OUTPATIENT)
Dept: FAMILY MEDICINE CLINIC | Facility: CLINIC | Age: 30
End: 2025-06-23
Payer: MEDICARE

## 2025-06-23 DIAGNOSIS — Z00.00 MEDICARE ANNUAL WELLNESS VISIT, SUBSEQUENT: Primary | ICD-10-CM

## 2025-06-23 DIAGNOSIS — T74.91XD DOMESTIC VIOLENCE OF ADULT, SUBSEQUENT ENCOUNTER: ICD-10-CM

## 2025-06-23 DIAGNOSIS — E66.01 MORBID (SEVERE) OBESITY DUE TO EXCESS CALORIES (HCC): ICD-10-CM

## 2025-06-23 DIAGNOSIS — F41.9 ANXIETY AND DEPRESSION: ICD-10-CM

## 2025-06-23 DIAGNOSIS — Z11.3 SCREENING FOR STDS (SEXUALLY TRANSMITTED DISEASES): ICD-10-CM

## 2025-06-23 DIAGNOSIS — N64.4 BREAST TENDERNESS IN FEMALE: ICD-10-CM

## 2025-06-23 DIAGNOSIS — F33.2 MAJOR DEPRESSIVE DISORDER, RECURRENT SEVERE WITHOUT PSYCHOTIC FEATURES (HCC): ICD-10-CM

## 2025-06-23 DIAGNOSIS — Z13.6 SCREENING FOR CARDIOVASCULAR CONDITION: ICD-10-CM

## 2025-06-23 DIAGNOSIS — F32.A ANXIETY AND DEPRESSION: ICD-10-CM

## 2025-06-23 PROCEDURE — G0439 PPPS, SUBSEQ VISIT: HCPCS | Performed by: FAMILY MEDICINE
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
## 2025-06-23 NOTE — ASSESSMENT & PLAN NOTE
Depression Screening Follow-up Plan: Patient's depression screening was positive with a PHQ-9 score of 6. Patient assessed for underlying major depression. They have no active suicidal ideations. Brief counseling provided and recommend additional follow-up/re-evaluation next office visit. Patient declines further evaluation by mental health professional and/or medications. They have no active suicidal ideations. Brief counseling provided and recommend additional follow-up/re-evaluation at next office visit.
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
## 2025-06-24 VITALS
DIASTOLIC BLOOD PRESSURE: 74 MMHG | SYSTOLIC BLOOD PRESSURE: 126 MMHG | TEMPERATURE: 98.4 F | WEIGHT: 224 LBS | RESPIRATION RATE: 17 BRPM | HEIGHT: 62 IN | BODY MASS INDEX: 41.22 KG/M2

## 2025-06-24 NOTE — PATIENT INSTRUCTIONS
Medicare Preventive Visit Patient Instructions  Thank you for completing your Welcome to Medicare Visit or Medicare Annual Wellness Visit today. Your next wellness visit will be due in one year (6/25/2026).  The screening/preventive services that you may require over the next 5-10 years are detailed below. Some tests may not apply to you based off risk factors and/or age. Screening tests ordered at today's visit but not completed yet may show as past due. Also, please note that scanned in results may not display below.  Preventive Screenings:  Service Recommendations Previous Testing/Comments   Colorectal Cancer Screening  * Colonoscopy    * Fecal Occult Blood Test (FOBT)/Fecal Immunochemical Test (FIT)  * Fecal DNA/Cologuard Test  * Flexible Sigmoidoscopy Age: 45-75 years old   Colonoscopy: every 10 years (may be performed more frequently if at higher risk)  OR  FOBT/FIT: every 1 year  OR  Cologuard: every 3 years  OR  Sigmoidoscopy: every 5 years  Screening may be recommended earlier than age 45 if at higher risk for colorectal cancer. Also, an individualized decision between you and your healthcare provider will decide whether screening between the ages of 76-85 would be appropriate. Colonoscopy: Not on file  FOBT/FIT: Not on file  Cologuard: Not on file  Sigmoidoscopy: Not on file          Breast Cancer Screening Age: 40+ years old  Frequency: every 1-2 years  Not required if history of left and right mastectomy Mammogram: Not on file    Screening Not Indicated   Cervical Cancer Screening Between the ages of 21-29, pap smear recommended once every 3 years.   Between the ages of 30-65, can perform pap smear with HPV co-testing every 5 years.   Recommendations may differ for women with a history of total hysterectomy, cervical cancer, or abnormal pap smears in past. Pap Smear: 10/24/2022    Screening Current   Hepatitis C Screening Once for adults born between 1945 and 1965  More frequently in patients at high  risk for Hepatitis C Hep C Antibody: 10/24/2022    Screening Current   Diabetes Screening 1-2 times per year if you're at risk for diabetes or have pre-diabetes Fasting glucose: 79 mg/dL (11/4/2022)  A1C: No results in last 5 years (No results in last 5 years)      Cholesterol Screening Once every 5 years if you don't have a lipid disorder. May order more often based on risk factors. Lipid panel: 12/20/2022    Screening Current     Other Preventive Screenings Covered by Medicare:  Abdominal Aortic Aneurysm (AAA) Screening: covered once if your at risk. You're considered to be at risk if you have a family history of AAA.  Lung Cancer Screening: covers low dose CT scan once per year if you meet all of the following conditions: (1) Age 55-77; (2) No signs or symptoms of lung cancer; (3) Current smoker or have quit smoking within the last 15 years; (4) You have a tobacco smoking history of at least 20 pack years (packs per day multiplied by number of years you smoked); (5) You get a written order from a healthcare provider.  Glaucoma Screening: covered annually if you're considered high risk: (1) You have diabetes OR (2) Family history of glaucoma OR (3)  aged 50 and older OR (4)  American aged 65 and older  Osteoporosis Screening: covered every 2 years if you meet one of the following conditions: (1) You're estrogen deficient and at risk for osteoporosis based off medical history and other findings; (2) Have a vertebral abnormality; (3) On glucocorticoid therapy for more than 3 months; (4) Have primary hyperparathyroidism; (5) On osteoporosis medications and need to assess response to drug therapy.   Last bone density test (DXA Scan): Not on file.  HIV Screening: covered annually if you're between the age of 15-65. Also covered annually if you are younger than 15 and older than 65 with risk factors for HIV infection. For pregnant patients, it is covered up to 3 times per  pregnancy.    Immunizations:  Immunization Recommendations   Influenza Vaccine Annual influenza vaccination during flu season is recommended for all persons aged >= 6 months who do not have contraindications   Pneumococcal Vaccine   * Pneumococcal conjugate vaccine = PCV13 (Prevnar 13), PCV15 (Vaxneuvance), PCV20 (Prevnar 20)  * Pneumococcal polysaccharide vaccine = PPSV23 (Pneumovax) Adults 19-65 yo with certain risk factors or if 65+ yo  If never received any pneumonia vaccine: recommend Prevnar 20 (PCV20)  Give PCV20 if previously received 1 dose of PCV13 or PPSV23   Hepatitis B Vaccine 3 dose series if at intermediate or high risk (ex: diabetes, end stage renal disease, liver disease)   Respiratory syncytial virus (RSV) Vaccine - COVERED BY MEDICARE PART D  * RSVPreF3 (Arexvy) CDC recommends that adults 60 years of age and older may receive a single dose of RSV vaccine using shared clinical decision-making (SCDM)   Tetanus (Td) Vaccine - COST NOT COVERED BY MEDICARE PART B Following completion of primary series, a booster dose should be given every 10 years to maintain immunity against tetanus. Td may also be given as tetanus wound prophylaxis.   Tdap Vaccine - COST NOT COVERED BY MEDICARE PART B Recommended at least once for all adults. For pregnant patients, recommended with each pregnancy.   Shingles Vaccine (Shingrix) - COST NOT COVERED BY MEDICARE PART B  2 shot series recommended in those 19 years and older who have or will have weakened immune systems or those 50 years and older     Health Maintenance Due:      Topic Date Due   • Cervical Cancer Screening  10/24/2027   • HIV Screening  Completed   • Hepatitis C Screening  Completed     Immunizations Due:      Topic Date Due   • COVID-19 Vaccine (1 - 2024-25 season) Never done   • Influenza Vaccine (Season Ended) 09/01/2025     Advance Directives   What are advance directives?  Advance directives are legal documents that state your wishes and plans for  medical care. These plans are made ahead of time in case you lose your ability to make decisions for yourself. Advance directives can apply to any medical decision, such as the treatments you want, and if you want to donate organs.   What are the types of advance directives?  There are many types of advance directives, and each state has rules about how to use them. You may choose a combination of any of the following:  Living will:  This is a written record of the treatment you want. You can also choose which treatments you do not want, which to limit, and which to stop at a certain time. This includes surgery, medicine, IV fluid, and tube feedings.   Durable power of  for healthcare (DPAHC):  This is a written record that states who you want to make healthcare choices for you when you are unable to make them for yourself. This person, called a proxy, is usually a family member or a friend. You may choose more than 1 proxy.  Do not resuscitate (DNR) order:  A DNR order is used in case your heart stops beating or you stop breathing. It is a request not to have certain forms of treatment, such as CPR. A DNR order may be included in other types of advance directives.  Medical directive:  This covers the care that you want if you are in a coma, near death, or unable to make decisions for yourself. You can list the treatments you want for each condition. Treatment may include pain medicine, surgery, blood transfusions, dialysis, IV or tube feedings, and a ventilator (breathing machine).  Values history:  This document has questions about your views, beliefs, and how you feel and think about life. This information can help others choose the care that you would choose.  Why are advance directives important?  An advance directive helps you control your care. Although spoken wishes may be used, it is better to have your wishes written down. Spoken wishes can be misunderstood, or not followed. Treatments may be given  even if you do not want them. An advance directive may make it easier for your family to make difficult choices about your care.   Weight Management   Why it is important to manage your weight:  Being overweight increases your risk of health conditions such as heart disease, high blood pressure, type 2 diabetes, and certain types of cancer. It can also increase your risk for osteoarthritis, sleep apnea, and other respiratory problems. Aim for a slow, steady weight loss. Even a small amount of weight loss can lower your risk of health problems.  How to lose weight safely:  A safe and healthy way to lose weight is to eat fewer calories and get regular exercise. You can lose up about 1 pound a week by decreasing the number of calories you eat by 500 calories each day.   Healthy meal plan for weight management:  A healthy meal plan includes a variety of foods, contains fewer calories, and helps you stay healthy. A healthy meal plan includes the following:  Eat whole-grain foods more often.  A healthy meal plan should contain fiber. Fiber is the part of grains, fruits, and vegetables that is not broken down by your body. Whole-grain foods are healthy and provide extra fiber in your diet. Some examples of whole-grain foods are whole-wheat breads and pastas, oatmeal, brown rice, and bulgur.  Eat a variety of vegetables every day.  Include dark, leafy greens such as spinach, kale, adrienne greens, and mustard greens. Eat yellow and orange vegetables such as carrots, sweet potatoes, and winter squash.   Eat a variety of fruits every day.  Choose fresh or canned fruit (canned in its own juice or light syrup) instead of juice. Fruit juice has very little or no fiber.  Eat low-fat dairy foods.  Drink fat-free (skim) milk or 1% milk. Eat fat-free yogurt and low-fat cottage cheese. Try low-fat cheeses such as mozzarella and other reduced-fat cheeses.  Choose meat and other protein foods that are low in fat.  Choose beans or other  legumes such as split peas or lentils. Choose fish, skinless poultry (chicken or turkey), or lean cuts of red meat (beef or pork). Before you cook meat or poultry, cut off any visible fat.   Use less fat and oil.  Try baking foods instead of frying them. Add less fat, such as margarine, sour cream, regular salad dressing and mayonnaise to foods. Eat fewer high-fat foods. Some examples of high-fat foods include french fries, doughnuts, ice cream, and cakes.  Eat fewer sweets.  Limit foods and drinks that are high in sugar. This includes candy, cookies, regular soda, and sweetened drinks.  Exercise:  Exercise at least 30 minutes per day on most days of the week. Some examples of exercise include walking, biking, dancing, and swimming. You can also fit in more physical activity by taking the stairs instead of the elevator or parking farther away from stores. Ask your healthcare provider about the best exercise plan for you.    © Copyright [employer] 2018 Information is for End User's use only and may not be sold, redistributed or otherwise used for commercial purposes. All illustrations and images included in CareNotes® are the copyrighted property of [employer] or [employer]

## 2025-08-11 ENCOUNTER — APPOINTMENT (OUTPATIENT)
Dept: LAB | Facility: CLINIC | Age: 30
End: 2025-08-11
Attending: FAMILY MEDICINE
Payer: MEDICARE

## 2025-08-11 ENCOUNTER — TELEPHONE (OUTPATIENT)
Age: 30
End: 2025-08-11

## (undated) DEVICE — PACK C-SECTION PBDS

## (undated) DEVICE — GLOVE PI ULTRA TOUCH SZ.6.5

## (undated) DEVICE — CHLORAPREP HI-LITE 26ML ORANGE

## (undated) DEVICE — Device

## (undated) DEVICE — TELFA NON-ADHERENT ABSORBENT DRESSING: Brand: TELFA

## (undated) DEVICE — LARGE, DISPOSABLE ALEXIS O C-SECTION PROTECTOR - RETRACTOR: Brand: ALEXIS ® O C-SECTION PROTECTOR - RETRACTOR

## (undated) DEVICE — MEDI-VAC YANKAUER SUCTION HANDLE W/STRAIGHT TIP & CONTROL VENT: Brand: CARDINAL HEALTH

## (undated) DEVICE — SUT PLAIN 2-0 CTX 27 IN 872H

## (undated) DEVICE — SUT VICRYL 0 CT-1 36 IN J946H

## (undated) DEVICE — 3M™ STERI-STRIP™ REINFORCED ADHESIVE SKIN CLOSURES, R1547, 1/2 IN X 4 IN (12 MM X 100 MM), 6 STRIPS/ENVELOPE: Brand: 3M™ STERI-STRIP™

## (undated) DEVICE — ABDOMINAL PAD: Brand: DERMACEA

## (undated) DEVICE — SUT MONOCRYL 3-0 UNDYED KS CS-1 Y523H

## (undated) DEVICE — SUT VICRYL 0 CTX 36 IN J978H

## (undated) DEVICE — SKIN MARKER DUAL TIP WITH RULER CAP, FLEXIBLE RULER AND LABELS: Brand: DEVON

## (undated) DEVICE — GAUZE SPONGES,16 PLY: Brand: CURITY

## (undated) DEVICE — SPONGE LAP 18 X 18 IN STRL RFD

## (undated) DEVICE — SUT MONOCRYL 4-0 PS-2 27 IN Y426H

## (undated) DEVICE — GLOVE INDICATOR PI UNDERGLOVE SZ 6.5 BLUE